# Patient Record
Sex: FEMALE | Race: WHITE | NOT HISPANIC OR LATINO | Employment: FULL TIME | URBAN - METROPOLITAN AREA
[De-identification: names, ages, dates, MRNs, and addresses within clinical notes are randomized per-mention and may not be internally consistent; named-entity substitution may affect disease eponyms.]

---

## 2019-10-18 ENCOUNTER — HOSPITAL ENCOUNTER (INPATIENT)
Facility: MEDICAL CENTER | Age: 54
LOS: 8 days | DRG: 981 | End: 2019-10-26
Attending: EMERGENCY MEDICINE | Admitting: HOSPITALIST
Payer: COMMERCIAL

## 2019-10-18 ENCOUNTER — HOSPITAL ENCOUNTER (OUTPATIENT)
Dept: RADIOLOGY | Facility: MEDICAL CENTER | Age: 54
End: 2019-10-18

## 2019-10-18 DIAGNOSIS — I48.91 ATRIAL FIBRILLATION WITH RVR (HCC): ICD-10-CM

## 2019-10-18 DIAGNOSIS — I63.89 CEREBROVASCULAR ACCIDENT (CVA) DUE TO OTHER MECHANISM (HCC): ICD-10-CM

## 2019-10-18 DIAGNOSIS — I63.512 CEREBRAL INFARCTION DUE TO OCCLUSION OF LEFT MIDDLE CEREBRAL ARTERY (HCC): ICD-10-CM

## 2019-10-18 PROBLEM — R47.01 BROCA'S APHASIA: Status: ACTIVE | Noted: 2019-10-18

## 2019-10-18 PROBLEM — N17.9 AKI (ACUTE KIDNEY INJURY) (HCC): Status: ACTIVE | Noted: 2019-10-18

## 2019-10-18 PROBLEM — E11.65 TYPE 2 DIABETES MELLITUS WITH HYPERGLYCEMIA, WITHOUT LONG-TERM CURRENT USE OF INSULIN (HCC): Status: ACTIVE | Noted: 2019-10-18

## 2019-10-18 PROBLEM — E66.09 CLASS 2 OBESITY DUE TO EXCESS CALORIES WITHOUT SERIOUS COMORBIDITY WITH BODY MASS INDEX (BMI) OF 36.0 TO 36.9 IN ADULT: Status: ACTIVE | Noted: 2019-10-18

## 2019-10-18 LAB
ALBUMIN SERPL BCP-MCNC: 3.6 G/DL (ref 3.2–4.9)
ALBUMIN/GLOB SERPL: 1.1 G/DL
ALP SERPL-CCNC: 97 U/L (ref 30–99)
ALT SERPL-CCNC: 10 U/L (ref 2–50)
ANION GAP SERPL CALC-SCNC: 9 MMOL/L (ref 0–11.9)
AST SERPL-CCNC: 13 U/L (ref 12–45)
BASOPHILS # BLD AUTO: 0.9 % (ref 0–1.8)
BASOPHILS # BLD: 0.06 K/UL (ref 0–0.12)
BILIRUB SERPL-MCNC: 0.7 MG/DL (ref 0.1–1.5)
BUN SERPL-MCNC: 29 MG/DL (ref 8–22)
CALCIUM SERPL-MCNC: 9.1 MG/DL (ref 8.5–10.5)
CHLORIDE SERPL-SCNC: 102 MMOL/L (ref 96–112)
CO2 SERPL-SCNC: 23 MMOL/L (ref 20–33)
CREAT SERPL-MCNC: 1.7 MG/DL (ref 0.5–1.4)
EKG IMPRESSION: NORMAL
EOSINOPHIL # BLD AUTO: 0.07 K/UL (ref 0–0.51)
EOSINOPHIL NFR BLD: 1.1 % (ref 0–6.9)
ERYTHROCYTE [DISTWIDTH] IN BLOOD BY AUTOMATED COUNT: 51.1 FL (ref 35.9–50)
GLOBULIN SER CALC-MCNC: 3.3 G/DL (ref 1.9–3.5)
GLUCOSE SERPL-MCNC: 196 MG/DL (ref 65–99)
HCT VFR BLD AUTO: 38.1 % (ref 37–47)
HGB BLD-MCNC: 12.2 G/DL (ref 12–16)
IMM GRANULOCYTES # BLD AUTO: 0.01 K/UL (ref 0–0.11)
IMM GRANULOCYTES NFR BLD AUTO: 0.2 % (ref 0–0.9)
LYMPHOCYTES # BLD AUTO: 1.65 K/UL (ref 1–4.8)
LYMPHOCYTES NFR BLD: 25 % (ref 22–41)
MCH RBC QN AUTO: 30.7 PG (ref 27–33)
MCHC RBC AUTO-ENTMCNC: 32 G/DL (ref 33.6–35)
MCV RBC AUTO: 95.7 FL (ref 81.4–97.8)
MONOCYTES # BLD AUTO: 0.59 K/UL (ref 0–0.85)
MONOCYTES NFR BLD AUTO: 8.9 % (ref 0–13.4)
NEUTROPHILS # BLD AUTO: 4.22 K/UL (ref 2–7.15)
NEUTROPHILS NFR BLD: 63.9 % (ref 44–72)
NRBC # BLD AUTO: 0 K/UL
NRBC BLD-RTO: 0 /100 WBC
PLATELET # BLD AUTO: 262 K/UL (ref 164–446)
PMV BLD AUTO: 11.4 FL (ref 9–12.9)
POTASSIUM SERPL-SCNC: 3.2 MMOL/L (ref 3.6–5.5)
PROT SERPL-MCNC: 6.9 G/DL (ref 6–8.2)
RBC # BLD AUTO: 3.98 M/UL (ref 4.2–5.4)
SODIUM SERPL-SCNC: 134 MMOL/L (ref 135–145)
TROPONIN T SERPL-MCNC: 26 NG/L (ref 6–19)
TSH SERPL DL<=0.005 MIU/L-ACNC: 0.62 UIU/ML (ref 0.38–5.33)
WBC # BLD AUTO: 6.6 K/UL (ref 4.8–10.8)

## 2019-10-18 PROCEDURE — 85025 COMPLETE CBC W/AUTO DIFF WBC: CPT

## 2019-10-18 PROCEDURE — 94760 N-INVAS EAR/PLS OXIMETRY 1: CPT

## 2019-10-18 PROCEDURE — 770020 HCHG ROOM/CARE - TELE (206)

## 2019-10-18 PROCEDURE — 99285 EMERGENCY DEPT VISIT HI MDM: CPT

## 2019-10-18 PROCEDURE — 36415 COLL VENOUS BLD VENIPUNCTURE: CPT

## 2019-10-18 PROCEDURE — 99223 1ST HOSP IP/OBS HIGH 75: CPT | Performed by: HOSPITALIST

## 2019-10-18 PROCEDURE — 80053 COMPREHEN METABOLIC PANEL: CPT

## 2019-10-18 PROCEDURE — 93005 ELECTROCARDIOGRAM TRACING: CPT | Performed by: EMERGENCY MEDICINE

## 2019-10-18 PROCEDURE — 96365 THER/PROPH/DIAG IV INF INIT: CPT

## 2019-10-18 PROCEDURE — 700105 HCHG RX REV CODE 258: Performed by: EMERGENCY MEDICINE

## 2019-10-18 PROCEDURE — 700111 HCHG RX REV CODE 636 W/ 250 OVERRIDE (IP): Performed by: EMERGENCY MEDICINE

## 2019-10-18 PROCEDURE — 84484 ASSAY OF TROPONIN QUANT: CPT

## 2019-10-18 PROCEDURE — 84443 ASSAY THYROID STIM HORMONE: CPT

## 2019-10-18 RX ORDER — BISACODYL 10 MG
10 SUPPOSITORY, RECTAL RECTAL
Status: DISCONTINUED | OUTPATIENT
Start: 2019-10-18 | End: 2019-10-26 | Stop reason: HOSPADM

## 2019-10-18 RX ORDER — PROMETHAZINE HYDROCHLORIDE 25 MG/1
12.5-25 SUPPOSITORY RECTAL EVERY 4 HOURS PRN
Status: DISCONTINUED | OUTPATIENT
Start: 2019-10-18 | End: 2019-10-26 | Stop reason: HOSPADM

## 2019-10-18 RX ORDER — PROCHLORPERAZINE EDISYLATE 5 MG/ML
5-10 INJECTION INTRAMUSCULAR; INTRAVENOUS EVERY 4 HOURS PRN
Status: DISCONTINUED | OUTPATIENT
Start: 2019-10-18 | End: 2019-10-26 | Stop reason: HOSPADM

## 2019-10-18 RX ORDER — PROMETHAZINE HYDROCHLORIDE 25 MG/1
12.5-25 TABLET ORAL EVERY 4 HOURS PRN
Status: DISCONTINUED | OUTPATIENT
Start: 2019-10-18 | End: 2019-10-26 | Stop reason: HOSPADM

## 2019-10-18 RX ORDER — ACETAMINOPHEN 325 MG/1
650 TABLET ORAL EVERY 6 HOURS PRN
Status: DISCONTINUED | OUTPATIENT
Start: 2019-10-18 | End: 2019-10-26 | Stop reason: HOSPADM

## 2019-10-18 RX ORDER — POLYETHYLENE GLYCOL 3350 17 G/17G
1 POWDER, FOR SOLUTION ORAL
Status: DISCONTINUED | OUTPATIENT
Start: 2019-10-18 | End: 2019-10-26 | Stop reason: HOSPADM

## 2019-10-18 RX ORDER — NAPROXEN SODIUM 220 MG
220 TABLET ORAL 2 TIMES DAILY PRN
Status: ON HOLD | COMMUNITY
End: 2019-10-26

## 2019-10-18 RX ORDER — HEPARIN SODIUM 5000 [USP'U]/ML
5000 INJECTION, SOLUTION INTRAVENOUS; SUBCUTANEOUS EVERY 8 HOURS
Status: DISCONTINUED | OUTPATIENT
Start: 2019-10-18 | End: 2019-10-19

## 2019-10-18 RX ORDER — CLONIDINE HYDROCHLORIDE 0.1 MG/1
0.1 TABLET ORAL EVERY 6 HOURS PRN
Status: DISCONTINUED | OUTPATIENT
Start: 2019-10-18 | End: 2019-10-26 | Stop reason: HOSPADM

## 2019-10-18 RX ORDER — SODIUM CHLORIDE, SODIUM LACTATE, POTASSIUM CHLORIDE, CALCIUM CHLORIDE 600; 310; 30; 20 MG/100ML; MG/100ML; MG/100ML; MG/100ML
INJECTION, SOLUTION INTRAVENOUS CONTINUOUS
Status: DISCONTINUED | OUTPATIENT
Start: 2019-10-18 | End: 2019-10-20

## 2019-10-18 RX ORDER — AMOXICILLIN 250 MG
2 CAPSULE ORAL 2 TIMES DAILY
Status: DISCONTINUED | OUTPATIENT
Start: 2019-10-18 | End: 2019-10-26 | Stop reason: HOSPADM

## 2019-10-18 RX ORDER — ONDANSETRON 2 MG/ML
4 INJECTION INTRAMUSCULAR; INTRAVENOUS EVERY 4 HOURS PRN
Status: DISCONTINUED | OUTPATIENT
Start: 2019-10-18 | End: 2019-10-26 | Stop reason: HOSPADM

## 2019-10-18 RX ORDER — ONDANSETRON 4 MG/1
4 TABLET, ORALLY DISINTEGRATING ORAL EVERY 4 HOURS PRN
Status: DISCONTINUED | OUTPATIENT
Start: 2019-10-18 | End: 2019-10-26 | Stop reason: HOSPADM

## 2019-10-18 RX ADMIN — DILTIAZEM HYDROCHLORIDE 5 MG/HR: 5 INJECTION INTRAVENOUS at 22:38

## 2019-10-18 ASSESSMENT — ENCOUNTER SYMPTOMS
NEUROLOGICAL NEGATIVE: 1
RESPIRATORY NEGATIVE: 1
CONSTITUTIONAL NEGATIVE: 1
GASTROINTESTINAL NEGATIVE: 1
EYES NEGATIVE: 1
PSYCHIATRIC NEGATIVE: 1
MUSCULOSKELETAL NEGATIVE: 1

## 2019-10-19 ENCOUNTER — APPOINTMENT (OUTPATIENT)
Dept: RADIOLOGY | Facility: MEDICAL CENTER | Age: 54
DRG: 981 | End: 2019-10-19
Attending: STUDENT IN AN ORGANIZED HEALTH CARE EDUCATION/TRAINING PROGRAM
Payer: COMMERCIAL

## 2019-10-19 ENCOUNTER — APPOINTMENT (OUTPATIENT)
Dept: RADIOLOGY | Facility: MEDICAL CENTER | Age: 54
DRG: 981 | End: 2019-10-19
Attending: PSYCHIATRY & NEUROLOGY
Payer: COMMERCIAL

## 2019-10-19 ENCOUNTER — APPOINTMENT (OUTPATIENT)
Dept: CARDIOLOGY | Facility: MEDICAL CENTER | Age: 54
DRG: 981 | End: 2019-10-19
Attending: HOSPITALIST
Payer: COMMERCIAL

## 2019-10-19 LAB
ANION GAP SERPL CALC-SCNC: 8 MMOL/L (ref 0–11.9)
ANISOCYTOSIS BLD QL SMEAR: ABNORMAL
APTT PPP: 26.5 SEC (ref 24.7–36)
BASOPHILS # BLD AUTO: 1.3 % (ref 0–1.8)
BASOPHILS # BLD: 0.07 K/UL (ref 0–0.12)
BUN SERPL-MCNC: 25 MG/DL (ref 8–22)
BURR CELLS BLD QL SMEAR: NORMAL
CALCIUM SERPL-MCNC: 8.4 MG/DL (ref 8.5–10.5)
CHLORIDE SERPL-SCNC: 103 MMOL/L (ref 96–112)
CO2 SERPL-SCNC: 23 MMOL/L (ref 20–33)
COMMENT 1642: NORMAL
CREAT SERPL-MCNC: 1.58 MG/DL (ref 0.5–1.4)
CREAT UR-MCNC: 202.4 MG/DL
EOSINOPHIL # BLD AUTO: 0.08 K/UL (ref 0–0.51)
EOSINOPHIL NFR BLD: 1.5 % (ref 0–6.9)
ERYTHROCYTE [DISTWIDTH] IN BLOOD BY AUTOMATED COUNT: 53 FL (ref 35.9–50)
GLUCOSE BLD-MCNC: 142 MG/DL (ref 65–99)
GLUCOSE BLD-MCNC: 158 MG/DL (ref 65–99)
GLUCOSE BLD-MCNC: 176 MG/DL (ref 65–99)
GLUCOSE BLD-MCNC: 193 MG/DL (ref 65–99)
GLUCOSE SERPL-MCNC: 140 MG/DL (ref 65–99)
HCT VFR BLD AUTO: 33.8 % (ref 37–47)
HGB BLD-MCNC: 10.1 G/DL (ref 12–16)
HYPOCHROMIA BLD QL SMEAR: ABNORMAL
IMM GRANULOCYTES # BLD AUTO: 0.02 K/UL (ref 0–0.11)
IMM GRANULOCYTES NFR BLD AUTO: 0.4 % (ref 0–0.9)
INR PPP: 1.12 (ref 0.87–1.13)
LYMPHOCYTES # BLD AUTO: 1.46 K/UL (ref 1–4.8)
LYMPHOCYTES NFR BLD: 27.2 % (ref 22–41)
MACROCYTES BLD QL SMEAR: ABNORMAL
MCH RBC QN AUTO: 29.3 PG (ref 27–33)
MCHC RBC AUTO-ENTMCNC: 29.9 G/DL (ref 33.6–35)
MCV RBC AUTO: 98 FL (ref 81.4–97.8)
MICROCYTES BLD QL SMEAR: ABNORMAL
MONOCYTES # BLD AUTO: 0.6 K/UL (ref 0–0.85)
MONOCYTES NFR BLD AUTO: 11.2 % (ref 0–13.4)
MORPHOLOGY BLD-IMP: NORMAL
NEUTROPHILS # BLD AUTO: 3.14 K/UL (ref 2–7.15)
NEUTROPHILS NFR BLD: 58.4 % (ref 44–72)
NRBC # BLD AUTO: 0 K/UL
NRBC BLD-RTO: 0 /100 WBC
OVALOCYTES BLD QL SMEAR: NORMAL
PLATELET # BLD AUTO: 208 K/UL (ref 164–446)
PLATELET BLD QL SMEAR: NORMAL
PMV BLD AUTO: 11.2 FL (ref 9–12.9)
POIKILOCYTOSIS BLD QL SMEAR: NORMAL
POTASSIUM SERPL-SCNC: 3.9 MMOL/L (ref 3.6–5.5)
PROTHROMBIN TIME: 14.7 SEC (ref 12–14.6)
RBC # BLD AUTO: 3.45 M/UL (ref 4.2–5.4)
RBC BLD AUTO: PRESENT
SODIUM SERPL-SCNC: 134 MMOL/L (ref 135–145)
SODIUM UR-SCNC: 104 MMOL/L
TROPONIN T SERPL-MCNC: 22 NG/L (ref 6–19)
TROPONIN T SERPL-MCNC: 24 NG/L (ref 6–19)
WBC # BLD AUTO: 5.4 K/UL (ref 4.8–10.8)

## 2019-10-19 PROCEDURE — 93306 TTE W/DOPPLER COMPLETE: CPT | Mod: 26 | Performed by: INTERNAL MEDICINE

## 2019-10-19 PROCEDURE — 99222 1ST HOSP IP/OBS MODERATE 55: CPT | Performed by: PSYCHIATRY & NEUROLOGY

## 2019-10-19 PROCEDURE — 700102 HCHG RX REV CODE 250 W/ 637 OVERRIDE(OP): Performed by: STUDENT IN AN ORGANIZED HEALTH CARE EDUCATION/TRAINING PROGRAM

## 2019-10-19 PROCEDURE — 700105 HCHG RX REV CODE 258: Performed by: HOSPITALIST

## 2019-10-19 PROCEDURE — 70544 MR ANGIOGRAPHY HEAD W/O DYE: CPT

## 2019-10-19 PROCEDURE — 85730 THROMBOPLASTIN TIME PARTIAL: CPT

## 2019-10-19 PROCEDURE — 3E02340 INTRODUCTION OF INFLUENZA VACCINE INTO MUSCLE, PERCUTANEOUS APPROACH: ICD-10-PCS | Performed by: STUDENT IN AN ORGANIZED HEALTH CARE EDUCATION/TRAINING PROGRAM

## 2019-10-19 PROCEDURE — 700111 HCHG RX REV CODE 636 W/ 250 OVERRIDE (IP): Performed by: STUDENT IN AN ORGANIZED HEALTH CARE EDUCATION/TRAINING PROGRAM

## 2019-10-19 PROCEDURE — 80048 BASIC METABOLIC PNL TOTAL CA: CPT

## 2019-10-19 PROCEDURE — 70450 CT HEAD/BRAIN W/O DYE: CPT

## 2019-10-19 PROCEDURE — A9270 NON-COVERED ITEM OR SERVICE: HCPCS | Performed by: STUDENT IN AN ORGANIZED HEALTH CARE EDUCATION/TRAINING PROGRAM

## 2019-10-19 PROCEDURE — 700102 HCHG RX REV CODE 250 W/ 637 OVERRIDE(OP): Performed by: HOSPITALIST

## 2019-10-19 PROCEDURE — 93880 EXTRACRANIAL BILAT STUDY: CPT

## 2019-10-19 PROCEDURE — 700117 HCHG RX CONTRAST REV CODE 255: Performed by: HOSPITALIST

## 2019-10-19 PROCEDURE — 90471 IMMUNIZATION ADMIN: CPT

## 2019-10-19 PROCEDURE — 99233 SBSQ HOSP IP/OBS HIGH 50: CPT | Performed by: STUDENT IN AN ORGANIZED HEALTH CARE EDUCATION/TRAINING PROGRAM

## 2019-10-19 PROCEDURE — 84484 ASSAY OF TROPONIN QUANT: CPT | Mod: 91

## 2019-10-19 PROCEDURE — 770020 HCHG ROOM/CARE - TELE (206)

## 2019-10-19 PROCEDURE — 82570 ASSAY OF URINE CREATININE: CPT

## 2019-10-19 PROCEDURE — 84300 ASSAY OF URINE SODIUM: CPT

## 2019-10-19 PROCEDURE — 36415 COLL VENOUS BLD VENIPUNCTURE: CPT

## 2019-10-19 PROCEDURE — 85610 PROTHROMBIN TIME: CPT

## 2019-10-19 PROCEDURE — 83036 HEMOGLOBIN GLYCOSYLATED A1C: CPT

## 2019-10-19 PROCEDURE — 70551 MRI BRAIN STEM W/O DYE: CPT

## 2019-10-19 PROCEDURE — 85025 COMPLETE CBC W/AUTO DIFF WBC: CPT

## 2019-10-19 PROCEDURE — 82962 GLUCOSE BLOOD TEST: CPT | Mod: 91

## 2019-10-19 PROCEDURE — 93306 TTE W/DOPPLER COMPLETE: CPT

## 2019-10-19 PROCEDURE — 99222 1ST HOSP IP/OBS MODERATE 55: CPT | Performed by: INTERNAL MEDICINE

## 2019-10-19 PROCEDURE — A9270 NON-COVERED ITEM OR SERVICE: HCPCS | Performed by: HOSPITALIST

## 2019-10-19 PROCEDURE — 700111 HCHG RX REV CODE 636 W/ 250 OVERRIDE (IP): Performed by: HOSPITALIST

## 2019-10-19 PROCEDURE — 90686 IIV4 VACC NO PRSV 0.5 ML IM: CPT | Performed by: HOSPITALIST

## 2019-10-19 RX ORDER — ASPIRIN 325 MG
325 TABLET ORAL DAILY
Status: DISCONTINUED | OUTPATIENT
Start: 2019-10-19 | End: 2019-10-20

## 2019-10-19 RX ORDER — HEPARIN SODIUM 5000 [USP'U]/100ML
INJECTION, SOLUTION INTRAVENOUS CONTINUOUS
Status: DISCONTINUED | OUTPATIENT
Start: 2019-10-19 | End: 2019-10-19

## 2019-10-19 RX ORDER — ASPIRIN 300 MG/1
300 SUPPOSITORY RECTAL DAILY
Status: DISCONTINUED | OUTPATIENT
Start: 2019-10-19 | End: 2019-10-20

## 2019-10-19 RX ORDER — ASPIRIN 81 MG/1
324 TABLET, CHEWABLE ORAL DAILY
Status: DISCONTINUED | OUTPATIENT
Start: 2019-10-19 | End: 2019-10-20

## 2019-10-19 RX ORDER — ATORVASTATIN CALCIUM 40 MG/1
40 TABLET, FILM COATED ORAL EVERY EVENING
Status: DISCONTINUED | OUTPATIENT
Start: 2019-10-19 | End: 2019-10-26 | Stop reason: HOSPADM

## 2019-10-19 RX ORDER — LORAZEPAM 2 MG/ML
1 INJECTION INTRAMUSCULAR
Status: COMPLETED | OUTPATIENT
Start: 2019-10-19 | End: 2019-10-19

## 2019-10-19 RX ORDER — LORAZEPAM 2 MG/ML
0.5 INJECTION INTRAMUSCULAR ONCE
Status: COMPLETED | OUTPATIENT
Start: 2019-10-19 | End: 2019-10-19

## 2019-10-19 RX ORDER — LORAZEPAM 2 MG/ML
1 INJECTION INTRAMUSCULAR ONCE
Status: COMPLETED | OUTPATIENT
Start: 2019-10-19 | End: 2019-10-19

## 2019-10-19 RX ADMIN — ASPIRIN 81 MG: 81 TABLET, COATED ORAL at 05:08

## 2019-10-19 RX ADMIN — HUMAN ALBUMIN MICROSPHERES AND PERFLUTREN 3 ML: 10; .22 INJECTION, SOLUTION INTRAVENOUS at 19:00

## 2019-10-19 RX ADMIN — SODIUM CHLORIDE, POTASSIUM CHLORIDE, SODIUM LACTATE AND CALCIUM CHLORIDE: 600; 310; 30; 20 INJECTION, SOLUTION INTRAVENOUS at 00:38

## 2019-10-19 RX ADMIN — POTASSIUM BICARBONATE 50 MEQ: 978 TABLET, EFFERVESCENT ORAL at 00:38

## 2019-10-19 RX ADMIN — LORAZEPAM 1 MG: 2 INJECTION INTRAMUSCULAR; INTRAVENOUS at 21:00

## 2019-10-19 RX ADMIN — LORAZEPAM 0.5 MG: 2 INJECTION INTRAMUSCULAR; INTRAVENOUS at 14:03

## 2019-10-19 RX ADMIN — INFLUENZA A VIRUS A/BRISBANE/02/2018 IVR-190 (H1N1) ANTIGEN (FORMALDEHYDE INACTIVATED), INFLUENZA A VIRUS A/KANSAS/14/2017 X-327 (H3N2) ANTIGEN (FORMALDEHYDE INACTIVATED), INFLUENZA B VIRUS B/PHUKET/3073/2013 ANTIGEN (FORMALDEHYDE INACTIVATED), AND INFLUENZA B VIRUS B/MARYLAND/15/2016 BX-69A ANTIGEN (FORMALDEHYDE INACTIVATED) 0.5 ML: 15; 15; 15; 15 INJECTION, SUSPENSION INTRAMUSCULAR at 00:38

## 2019-10-19 RX ADMIN — DILTIAZEM HYDROCHLORIDE 30 MG: 30 TABLET, FILM COATED ORAL at 23:06

## 2019-10-19 RX ADMIN — HEPARIN SODIUM 5000 UNITS: 5000 INJECTION, SOLUTION INTRAVENOUS; SUBCUTANEOUS at 00:38

## 2019-10-19 RX ADMIN — DILTIAZEM HYDROCHLORIDE 30 MG: 30 TABLET, FILM COATED ORAL at 13:02

## 2019-10-19 RX ADMIN — HEPARIN SODIUM 5000 UNITS: 5000 INJECTION, SOLUTION INTRAVENOUS; SUBCUTANEOUS at 05:08

## 2019-10-19 RX ADMIN — LORAZEPAM 1 MG: 2 INJECTION INTRAMUSCULAR; INTRAVENOUS at 20:17

## 2019-10-19 RX ADMIN — DILTIAZEM HYDROCHLORIDE 30 MG: 30 TABLET, FILM COATED ORAL at 18:16

## 2019-10-19 RX ADMIN — INSULIN HUMAN 1 UNITS: 100 INJECTION, SOLUTION PARENTERAL at 13:36

## 2019-10-19 RX ADMIN — SODIUM CHLORIDE, POTASSIUM CHLORIDE, SODIUM LACTATE AND CALCIUM CHLORIDE: 600; 310; 30; 20 INJECTION, SOLUTION INTRAVENOUS at 13:02

## 2019-10-19 RX ADMIN — DILTIAZEM HYDROCHLORIDE 10 MG/HR: 5 INJECTION INTRAVENOUS at 22:09

## 2019-10-19 RX ADMIN — ATORVASTATIN CALCIUM 40 MG: 40 TABLET, FILM COATED ORAL at 18:16

## 2019-10-19 RX ADMIN — DILTIAZEM HYDROCHLORIDE 10 MG/HR: 5 INJECTION INTRAVENOUS at 11:00

## 2019-10-19 SDOH — HEALTH STABILITY: MENTAL HEALTH: HOW OFTEN DO YOU HAVE A DRINK CONTAINING ALCOHOL?: NEVER

## 2019-10-19 ASSESSMENT — COGNITIVE AND FUNCTIONAL STATUS - GENERAL
DAILY ACTIVITIY SCORE: 24
SUGGESTED CMS G CODE MODIFIER MOBILITY: CH
SUGGESTED CMS G CODE MODIFIER DAILY ACTIVITY: CH
MOBILITY SCORE: 24

## 2019-10-19 ASSESSMENT — PATIENT HEALTH QUESTIONNAIRE - PHQ9
8. MOVING OR SPEAKING SO SLOWLY THAT OTHER PEOPLE COULD HAVE NOTICED. OR THE OPPOSITE, BEING SO FIGETY OR RESTLESS THAT YOU HAVE BEEN MOVING AROUND A LOT MORE THAN USUAL: NOT AT ALL
SUM OF ALL RESPONSES TO PHQ9 QUESTIONS 1 AND 2: 2
3. TROUBLE FALLING OR STAYING ASLEEP OR SLEEPING TOO MUCH: MORE THAN HALF THE DAYS
6. FEELING BAD ABOUT YOURSELF - OR THAT YOU ARE A FAILURE OR HAVE LET YOURSELF OR YOUR FAMILY DOWN: NOT AL ALL
5. POOR APPETITE OR OVEREATING: NEARLY EVERY DAY
2. FEELING DOWN, DEPRESSED, IRRITABLE, OR HOPELESS: SEVERAL DAYS
7. TROUBLE CONCENTRATING ON THINGS, SUCH AS READING THE NEWSPAPER OR WATCHING TELEVISION: NOT AT ALL
SUM OF ALL RESPONSES TO PHQ QUESTIONS 1-9: 10
1. LITTLE INTEREST OR PLEASURE IN DOING THINGS: SEVERAL DAYS
4. FEELING TIRED OR HAVING LITTLE ENERGY: NEARLY EVERY DAY
9. THOUGHTS THAT YOU WOULD BE BETTER OFF DEAD, OR OF HURTING YOURSELF: NOT AT ALL

## 2019-10-19 ASSESSMENT — LIFESTYLE VARIABLES
EVER_SMOKED: YES
HOW MANY TIMES IN THE PAST YEAR HAVE YOU HAD 5 OR MORE DRINKS IN A DAY: 0
TOTAL SCORE: 0
AVERAGE NUMBER OF DAYS PER WEEK YOU HAVE A DRINK CONTAINING ALCOHOL: 0
CONSUMPTION TOTAL: NEGATIVE
ON A TYPICAL DAY WHEN YOU DRINK ALCOHOL HOW MANY DRINKS DO YOU HAVE: 0
TOTAL SCORE: 0
ALCOHOL_USE: NO
TOTAL SCORE: 0
EVER HAD A DRINK FIRST THING IN THE MORNING TO STEADY YOUR NERVES TO GET RID OF A HANGOVER: NO
HAVE PEOPLE ANNOYED YOU BY CRITICIZING YOUR DRINKING: NO
EVER FELT BAD OR GUILTY ABOUT YOUR DRINKING: NO
HAVE YOU EVER FELT YOU SHOULD CUT DOWN ON YOUR DRINKING: NO

## 2019-10-19 ASSESSMENT — ENCOUNTER SYMPTOMS
FEVER: 0
PALPITATIONS: 1
NERVOUS/ANXIOUS: 0
SHORTNESS OF BREATH: 0
DEPRESSION: 0
COUGH: 0

## 2019-10-19 ASSESSMENT — COPD QUESTIONNAIRES
HAVE YOU SMOKED AT LEAST 100 CIGARETTES IN YOUR ENTIRE LIFE: YES
COPD SCREENING SCORE: 6
IN THE PAST 12 MONTHS DO YOU DO LESS THAN YOU USED TO BECAUSE OF YOUR BREATHING PROBLEMS: AGREE
DURING THE PAST 4 WEEKS HOW MUCH DID YOU FEEL SHORT OF BREATH: SOME OF THE TIME
DO YOU EVER COUGH UP ANY MUCUS OR PHLEGM?: YES, A FEW DAYS A WEEK OR MONTH

## 2019-10-19 NOTE — PROGRESS NOTES
2 RN skin check complete  No areas of concern noted at this time skin clean dry and intact scattered bruises noted.

## 2019-10-19 NOTE — ASSESSMENT & PLAN NOTE
-HR is somewhat better but remains tachycardic, DCCV tomorrow  -start DOAC  -monitor on tele, replace Mg/K  -changed to metoprolol  mg daily  -digoxin 125 mcg daily

## 2019-10-19 NOTE — PROGRESS NOTES
Bedside report received, assumed pt care. Pt sitting up in bed, no signs of distress and no complaints of pain. RN assisted pt to restroom, pt ambulate with steady gait. Neuro assessment complete, pt has bilateral equal strength. Pt updated on POC and questions answered. Tele monitor on, safety precautions in place, call light in reach.

## 2019-10-19 NOTE — ASSESSMENT & PLAN NOTE
10/19-10/21 - resolved  Results from last 7 days   Lab Units 10/22/19  0316 10/21/19  0230 10/19/19  0543 10/18/19  2145   POTASSIUM 102 mmol/L 4.4 4.2 3.9 3.2*   CREATININE 109 mg/dL 1.94* 1.65* 1.58* 1.70*   IF DOMINGO AMER 349007 mL/min/1.73 m 2 32* 39* 41* 38*   IF NON  AMER 109GFRB mL/min/1.73 m 2 27* 32* 34* 31*   MAGNESIUM 561 mg/dL 1.8 1.7  --   --

## 2019-10-19 NOTE — ED NOTES
Bedside report to receiving FABIOLA Amador for T703-2. Questions answered. All belongings accounted for at transfer.

## 2019-10-19 NOTE — ED PROVIDER NOTES
ED Provider Note    CHIEF COMPLAINT  Chief Complaint   Patient presents with   • Atrial Fibrillation     pt was in afib RVR at outside facility in the 170's   • Sent by MD     transfer from [facility]  Judith Vallecillo is a 53 y.o. female who presents to the emergency department for evaluation of A. fib and possible stroke.  The patient has a receptive and expressive aphasia is difficult to get much history from her.  She states she had a hard time speaking now for about the last 3 weeks.  She is having a hard time finding words and speaking.  She denies any focal numbness tingling weakness.  She went to the hospital today in [place] where she was found to have A. fib with RVR.  She was given diltiazem sent here for evaluation.  Patient denies any other numbness tingling or weakness.  She has been having chest pain.  Is off and on for 3 weeks as anterior chest pain described as pressure.  No tearing pain region the back.  This is mild shortness of breath no nausea or vomiting.  History is somewhat limited because of her aphasia.  She does smoke but denies drugs or alcohol.  Denies any other acute concerns or complaints    REVIEW OF SYSTEMS  See [facility] for further details. All other systems are negative.    PAST MEDICAL HISTORY  Past Medical History:   Diagnosis Date   • Pneumonia 1975    as a child       FAMILY HISTORY  History reviewed. No pertinent family history.    SOCIAL HISTORY  Social History     Socioeconomic History   • Marital status: Single     Spouse name: Not on file   • Number of children: Not on file   • Years of education: Not on file   • Highest education level: Not on file   Occupational History   • Not on file   Social Needs   • Financial resource strain: Not on file   • Food insecurity:     Worry: Not on file     Inability: Not on file   • Transportation needs:     Medical: Not on file     Non-medical: Not on file   Tobacco Use   • Smoking status: Current Every Day Smoker      "Packs/day: 0.50     Years: 20.00     Pack years: 10.00     Types: Cigarettes   Substance and Sexual Activity   • Alcohol use: Yes     Comment: very rare drink of a beer or wine   • Drug use: No   • Sexual activity: Not on file   Lifestyle   • Physical activity:     Days per week: Not on file     Minutes per session: Not on file   • Stress: Not on file   Relationships   • Social connections:     Talks on phone: Not on file     Gets together: Not on file     Attends Voodoo service: Not on file     Active member of club or organization: Not on file     Attends meetings of clubs or organizations: Not on file     Relationship status: Not on file   • Intimate partner violence:     Fear of current or ex partner: Not on file     Emotionally abused: Not on file     Physically abused: Not on file     Forced sexual activity: Not on file   Other Topics Concern   • Not on file   Social History Narrative   • Not on file       SURGICAL HISTORY  Past Surgical History:   Procedure Laterality Date   • RECOVERY  6/2/2011    [facility]       CURRENT MEDICATIONS  Home Medications    **Home medications have not yet been reviewed for this encounter**         ALLERGIES  Allergies   Allergen Reactions   • Bee      Swelling and airway closure.   • Latex      Swelling, redness, itching.   • Strawberry      itching       PHYSICAL EXAM  VITAL SIGNS: /55   Pulse (!) 123   Temp 36.1 °C (97 °F) (Temporal)   Resp 18   Ht 1.575 m (5' 2\")   Wt 89.8 kg (198 lb)   SpO2 96%   BMI 36.21 kg/m²    Constitutional: Awake alert nontoxic no acute distress  HENT: Normocephalic, Atraumatic, Bilateral external ears normal, Oropharynx moist, No oral exudates, Nose normal.   Eyes: PERRL, EOMI, Conjunctiva normal, No discharge.   Cardiovascular: Tachycardic and regular regular no murmurs rubs or gallops  Thorax & Lungs: Normal breath sounds, No respiratory distress, No wheezing,   Abdomen: Bowel " sounds normal, Soft, No tenderness  Skin: Warm, Dry, No erythema, No rash.   Musculoskeletal: Good range of motion in all major joints.  No asymmetric edema good pulses.  Neurologic: Alert, the patient has an aphasia and she is not able to comply with the entire neurologic examination.  She seems to have no focal weakness in arms legs no decreased sensation.  Cranial nerves are grossly intact.  She will not comply the detailed examination per  Psychiatric: Affect normal,      Results for orders placed or performed during the hospital encounter of 10/18/19   CBC WITH DIFFERENTIAL   Result Value Ref Range    WBC 6.6 4.8 - 10.8 K/uL    RBC 3.98 (L) 4.20 - 5.40 M/uL    Hemoglobin 12.2 12.0 - 16.0 g/dL    Hematocrit 38.1 37.0 - 47.0 %    MCV 95.7 81.4 - 97.8 fL    MCH 30.7 27.0 - 33.0 pg    MCHC 32.0 (L) 33.6 - 35.0 g/dL    RDW 51.1 (H) 35.9 - 50.0 fL    Platelet Count 262 164 - 446 K/uL    MPV 11.4 9.0 - 12.9 fL    Neutrophils-Polys 63.90 44.00 - 72.00 %    Lymphocytes 25.00 22.00 - 41.00 %    Monocytes 8.90 0.00 - 13.40 %    Eosinophils 1.10 0.00 - 6.90 %    Basophils 0.90 0.00 - 1.80 %    Immature Granulocytes 0.20 0.00 - 0.90 %    Nucleated RBC 0.00 /100 WBC    Neutrophils (Absolute) 4.22 2.00 - 7.15 K/uL    Lymphs (Absolute) 1.65 1.00 - 4.80 K/uL    Monos (Absolute) 0.59 0.00 - 0.85 K/uL    Eos (Absolute) 0.07 0.00 - 0.51 K/uL    Baso (Absolute) 0.06 0.00 - 0.12 K/uL    Immature Granulocytes (abs) 0.01 0.00 - 0.11 K/uL    NRBC (Absolute) 0.00 K/uL   COMP METABOLIC PANEL   Result Value Ref Range    Sodium 134 (L) 135 - 145 mmol/L    Potassium 3.2 (L) 3.6 - 5.5 mmol/L    Chloride 102 96 - 112 mmol/L    Co2 23 20 - 33 mmol/L    Anion Gap 9.0 0.0 - 11.9    Glucose 196 (H) 65 - 99 mg/dL    Bun 29 (H) 8 - 22 mg/dL    Creatinine 1.70 (H) 0.50 - 1.40 mg/dL    Calcium 9.1 8.5 - 10.5 mg/dL    AST(SGOT) 13 12 - 45 U/L    ALT(SGPT) 10 2 - 50 U/L    Alkaline Phosphatase 97 30 - 99 U/L    Total Bilirubin 0.7 0.1 - 1.5 mg/dL     Albumin 3.6 3.2 - 4.9 g/dL    Total Protein 6.9 6.0 - 8.2 g/dL    Globulin 3.3 1.9 - 3.5 g/dL    A-G Ratio 1.1 g/dL   TROPONIN   Result Value Ref Range    Troponin T 26 (H) 6 - 19 ng/L   ESTIMATED GFR   Result Value Ref Range    GFR If  38 (A) >60 mL/min/1.73 m 2    GFR If Non  31 (A) >60 mL/min/1.73 m 2   EKG (NOW)   Result Value Ref Range    Report       [facility] Emergency Dept.    Test Date:  2019-10-18  Pt Name:    PHU LIANG           Department: ER  MRN:        [id number]                      Room:       RD 10  Gender:     Female                       Technician: [id number]  :        1965                   Requested By:RICHIE JEFF  Order #:    [id number]                    Reading MD: RICHIE JEFF. AMD    Measurements  Intervals                                Axis  Rate:       125                          P:  OK:                                      QRS:        92  QRSD:       80                           T:          255  QT:         316  QTc:        456    Interpretive Statements  ATRIAL FIBRILLATION, V-RATE    MULTIPLE VENTRICULAR PREMATURE COMPLEXES  PROBABLE LATERAL INFARCT, AGE INDETERMINATE  CONSIDER ANTEROSEPTAL INFARCT  BORDERLINE T ABNORMALITIES, INFERIOR LEAD    Electronically Signed On 10- 22:24:31 PDT by RICHIE JEFF. AMD          RADIOLOGY/PROCEDURES  OUTSIDE IMAGES-CT HEAD   Final Result      OUTSIDE IMAGES-DX CHEST   Final Result        Images from the transferring hospital been reviewed.    COURSE & MEDICAL DECISION MAKING  Pertinent Labs & Imaging studies reviewed. (See chart for details)      The patient presents transferred here for stroke symptoms and A. fib with RVR.      She had a head CT at the transferring hospital which shows a subacute stroke.    The patient symptoms have been present for 2 or 3 weeks.  Therefore she is not a candidate for alteplase and therefore she is not a candidate for  thrombectomy.  Therefore CT is or not obtained emergently.    She does have A. fib with RVR.  She received diltiazem in the hospital.  She is no longer on diltiazem and heart rate is again rapid.    We will restart her on diltiazem infusion.    The patient will be admitted to hospice for continued work-up and treatment.  She had a complete set of labs and a CT at the transfer hospital.  These have been reviewed.    The patient is not a candidate for alteplase for the abnormal finding on the CT scan and the timing.  Several days after the onset.    She started on a diltiazem infusion will be admitted to the hospitalist.  Some labs will be repeated including a troponin.  TSH is pending.  Is admitted to the hospitalist in guarded condition per    FINAL IMPRESSION  1. Cerebrovascular accident (CVA) due to other mechanism (HCC)     2. Atrial fibrillation with RVR (HCC)         2.   3.         Electronically signed by: Ramesh Luz, 10/18/2019 10:21 PM

## 2019-10-19 NOTE — PROGRESS NOTES
Received patient as a new admit from the ED. Bedside report received, assumed care of patient. Patient transported to floor on zoll monitor by RN. Pt is A+O x 4. No acute distress noted. Rhythm is Afib 130s, patient received on a Cardizem drip at 10mg/hr. Patient is not on oxygen. Informed of patient of safety and call bell system. Bed is low/locked, call bell within reach, bed alarm in place.

## 2019-10-19 NOTE — CONSULTS
Cardiology Consult Note    DOS: 10/19/2019    Consulting physician: Meño Pena DO    Chief complaint/Reason for consult: AF w RVR    HPI:  Patient is a 52 yo F. She has a history of prior stroke. She lives and works in [place] about 2 hours from here. Sounds like she was in her usual state of health until about 3 days ago when suddenly she had word finding difficulties during speech. She denies any focal weakness, vertigo, facial droop, or other deficits. She did not seek any care for this. Yesterday evening she developed exertional chest tightness. Location is substernal. Associated with palpitations. She presented to the ED and was found to be in AF with RVR. She was started on a diltiazem gtt and was admitted to the telemetry unit. No complaints this AM but with persistent word finding difficulties.    ROS (+ highlighted in red):  Constitutional: Fevers/chills/fatigue/weightloss  HEENT: Blurry vision/eye pain/sore throat/hearing loss  Respiratory: Shortness of breath/cough  Cardiovascular: Chest pain/palpitations/edema/orthopnea/syncope  GI: Nausea/vomitting/diarrhea  MSK: Arthralgias/myagias/muscle weakness  Skin: Rash/sores  Neurological: Numbness/tremors/vertigo  Endocrine: Excessive thirst/polyuria/cold intolerance/heat intolerance  Psych: Depression/anxiety    Past Medical History:   Diagnosis Date   • Pneumonia 1975    as a child       Past Surgical History:   Procedure Laterality Date   • RECOVERY  6/2/2011    Performed by SURGERY[facility] at [facility]       Social History     Socioeconomic History   • Marital status: Single     Spouse name: Not on file   • Number of children: Not on file   • Years of education: Not on file   • Highest education level: Not on file   Occupational History   • Not on file   Social Needs   • Financial resource strain: Not on file   • Food insecurity:     Worry: Not on file     Inability: Not on file   • Transportation needs:     Medical: Not on file      Non-medical: Not on file   Tobacco Use   • Smoking status: Current Every Day Smoker     Packs/day: 0.50     Years: 20.00     Pack years: 10.00     Types: Cigarettes   • Smokeless tobacco: Never Used   Substance and Sexual Activity   • Alcohol use: Not Currently     Frequency: Never     Comment: very rare drink of a beer or wine   • Drug use: No   • Sexual activity: Not on file   Lifestyle   • Physical activity:     Days per week: Not on file     Minutes per session: Not on file   • Stress: Not on file   Relationships   • Social connections:     Talks on phone: Not on file     Gets together: Not on file     Attends Christianity service: Not on file     Active member of club or organization: Not on file     Attends meetings of clubs or organizations: Not on file     Relationship status: Not on file   • Intimate partner violence:     Fear of current or ex partner: Not on file     Emotionally abused: Not on file     Physically abused: Not on file     Forced sexual activity: Not on file   Other Topics Concern   • Not on file   Social History Narrative   • Not on file       Family History   Problem Relation Age of Onset   • Heart Disease Mother    • Heart Disease Father        Allergies   Allergen Reactions   • Bee Anaphylaxis     Swelling and airway closure.   • Latex Itching and Swelling     Swelling, redness, itching.   • Strawberry Itching     itching       Current Facility-Administered Medications   Medication Dose Route Frequency Provider Last Rate Last Dose   • heparin infusion 25,000 units in 500 mL 0.45% NACL   Intravenous Continuous Meño Pena D.O.       • DILTIAZem (CARDIZEM) tablet 30 mg  30 mg Oral Q6HRS Meño Pena D.O.       • acetaminophen (TYLENOL) tablet 650 mg  650 mg Oral Q6HRS PRN Polo Trammell M.D.       • cloNIDine (CATAPRES) tablet 0.1 mg  0.1 mg Oral Q6HRS PRN Polo Trammell M.D.       • ondansetron (ZOFRAN) syringe/vial injection 4 mg  4 mg Intravenous Q4HRS PRN Polo Trammell  M.D.       • ondansetron (ZOFRAN ODT) dispertab 4 mg  4 mg Oral Q4HRS PRN Polo Trammell M.D.       • promethazine (PHENERGAN) tablet 12.5-25 mg  12.5-25 mg Oral Q4HRS PRN Polo Trammell M.D.       • promethazine (PHENERGAN) suppository 12.5-25 mg  12.5-25 mg Rectal Q4HRS PRN Polo Trammell M.D.       • prochlorperazine (COMPAZINE) injection 5-10 mg  5-10 mg Intravenous Q4HRS PRN Polo Trammell M.D.       • senna-docusate (PERICOLACE or SENOKOT S) 8.6-50 MG per tablet 2 Tab  2 Tab Oral BID Polo Trammell M.D.        And   • polyethylene glycol/lytes (MIRALAX) PACKET 1 Packet  1 Packet Oral QDAY PRN Polo Trammell M.D.        And   • magnesium hydroxide (MILK OF MAGNESIA) suspension 30 mL  30 mL Oral QDAY PRN Polo Trammell M.D.        And   • bisacodyl (DULCOLAX) suppository 10 mg  10 mg Rectal QDAY PRN Polo Trammell M.D.       • lactated ringers infusion   Intravenous Continuous Polo Trammell M.D. 83 mL/hr at 10/19/19 0038     • DILTIAZem (CARDIZEM) 100 mg in D5W 100 mL Infusion  10 mg/hr Intravenous Continuous Polo Trammell M.D. 10 mL/hr at 10/19/19 0001 10 mg/hr at 10/19/19 0001   • aspirin EC (ECOTRIN) tablet 81 mg  81 mg Oral DAILY Polo Trammell M.D.   81 mg at 10/19/19 0508   • insulin regular (HUMULIN R) injection 1-6 Units  1-6 Units Subcutaneous 4X/DAY CYNDY Trammell M.D.        And   • glucose 4 g chewable tablet 16 g  16 g Oral Q15 MIN PRN Polo Trammell M.D.        And   • DEXTROSE 10% BOLUS 250 mL  250 mL Intravenous Q15 MIN PRN Polo Trammell M.D.           Physical Exam:  Vitals:    10/18/19 2331 10/19/19 0009 10/19/19 0320 10/19/19 0755   BP: 100/64 128/66 117/57 115/71   Pulse: 95 (!) 108 (!) 107 91   Resp:  16 16 16   Temp:  36.7 °C (98 °F) 36.3 °C (97.3 °F) 36.2 °C (97.2 °F)   TempSrc:  Temporal Temporal Temporal   SpO2: 97% 97% 95% 97%   Weight:  91.3 kg (201 lb 4.5 oz)     Height:         General appearance: NAD, conversant   Eyes: anicteric sclerae, moist conjunctivae; no  lid-lag; PERRLA  HENT: Atraumatic; oropharynx clear with moist mucous membranes and no mucosal ulcerations; normal hard and soft palate  Neck: Trachea midline; FROM, supple, no thyromegaly or lymphadenopathy  Lungs: CTA, with normal respiratory effort and no intercostal retractions  CV: irregularly irregular, no MRGs, no JVD   Abdomen: Soft, non-tender; no masses or HSM  Extremities: No peripheral edema or extremity lymphadenopathy  Skin: Normal temperature, turgor and texture; no rash, ulcers or subcutaneous nodules  Psych: Appropriate affect, alert and oriented to person, place and time    Data:  Labs reviewed    Prior echo/stress results reviewed:  Asymmetric septal hypertrophy.  Left ventricular ejection fraction is 50% to 55%.  Mildly dilated left atrium.  Mild mitral regurgitation.  Small pericardial effusion without evidence of hemodynamic compromise.    MR dick 2011:  Acute lacunar infarct in the left globus pallidus/internal capsule.    Two nonacute lacunar infarcts are present in the mle.    Several small foci of increased T2 signal in the subcortical white matter, nonspecific, probably indicating small vessel disease..    Numerous enlarged perivascular spaces in the subcortical white matter and basal ganglia, possibly also indicating small vessel disease    EKG interpreted by me:   AF with RVR    Impression/Plan:  1)Subacute CVA  2)AF w RVR  3)Chest pain    -I agree with trying to control her rate, would avoid cardioverting her now blindly as she may still have residual thrombus  -Although she is out of any intervention window for her stroke, I would still work her up including MR brain, MRA head/neck  -I will order repeat echo and MPI  -Agree with anticoagulation, would transition her to DOAC  -If she remains in persistent AF after a few weeks, we can plan for elective cardioversion after 4 weeks OAC  -Afterwards, I think reasonable to add antiarrhythmic therapy (IC agent if stress negative) for  symptomatic AF  -If she self converts before then we can add antiarrhythmic therapy     Dallas Hayden MD

## 2019-10-19 NOTE — ED NOTES
Medication reconciliation updated and complete per pt at bedside  Allergies have been verified and updated   No oral ABX within the last 14 days  Pt home pharmacy:[facility]

## 2019-10-19 NOTE — PROGRESS NOTES
Pt back to floor from MRI. Tele monitor placed and monitor room notified. MRI was not able to be completed due to pt claustrophobia. Pt was premedicated with 0.5mg Ativan. MD notified.

## 2019-10-19 NOTE — DISCHARGE PLANNING
[facility] Transitional Care Coordination        Referral from:  Vitaliy Pena MD  Facesheet indicates: [facility]  Potential Rehab Diagnosis:  Stroke work up pending.     D/C support: Will need to confirm; lives in [place], [place]; works full time.      Physiatry consultation Pended per protocol.        Thank you for referral.  Will continue to follow.

## 2019-10-19 NOTE — H&P
Hospital Medicine History & Physical Note    Date of Service  10/18/2019    Primary Care Physician  Pcp Pt States None    Consultants  None    Code Status  Full code    Chief Complaint  Chest tightness    History of Presenting Illness  53 y.o. female with prior history of diabetes mellitus not currently on medication regimen, and recent cerebrovascular accident with now Broca's aphasia, was in her usual state of health until the day prior to admission.  She reports the onset of chest tightness, this was without exacerbating or alleviating factors and, and did not radiate anywhere.  She denies palpitations.  She has no other symptoms including shortness of breath, abdominal pain, nausea vomiting diarrhea or constipation.  No recent fever or chills.    Review of Systems  Review of Systems   Constitutional: Negative.    HENT: Negative.    Eyes: Negative.    Respiratory: Negative.    Cardiovascular: Positive for chest pain.   Gastrointestinal: Negative.    Genitourinary: Negative.    Musculoskeletal: Negative.    Skin: Negative.    Neurological: Negative.    Endo/Heme/Allergies: Negative.    Psychiatric/Behavioral: Negative.        Past Medical History   has a past medical history of Pneumonia (1975).    Surgical History   has a past surgical history that includes recovery (6/2/2011).     Family History  family history includes Heart Disease in her father and mother.     Social History   reports that she has been smoking cigarettes. She has a 10.00 pack-year smoking history. She has never used smokeless tobacco. She reports that she drinks alcohol. She reports that she does not use drugs.    Allergies  Allergies   Allergen Reactions   • Bee      Swelling and airway closure.   • Latex      Swelling, redness, itching.   • Strawberry      itching       Medications  Prior to Admission Medications   Prescriptions Last Dose Informant Patient Reported? Taking?   amlodipine (NORVASC) 10 MG TABS   No No   Sig: Take 1 Tab by  mouth every day.   aspirin  MG TBEC   No No   Sig: Take 1 Tab by mouth every day.   atorvastatin (LIPITOR) 10 MG TABS   No No   Sig: Take 1 Tab by mouth every evening.   glyBURIDE (DIABETA) 2.5 MG TABS   No No   Sig: Take 1 Tab by mouth 2 times a day, with meals.   lisinopril (PRINIVIL) 10 MG TABS   No No   Sig: Take 2 Tabs by mouth every day.   metformin (GLUCOPHAGE) 500 MG TABS   No No   Sig: Take 1 Tab by mouth 2 times a day, with meals.   omeprazole (PRILOSEC) 20 MG CPDR   No No   Sig: Take 1 Cap by mouth every day.      Facility-Administered Medications: None       Physical Exam  Temp:  [36.1 °C (97 °F)] 36.1 °C (97 °F)  Pulse:  [109-152] 113  Resp:  [18] 18  BP: (100-124)/(55-75) 117/74  SpO2:  [96 %-97 %] 96 %    Physical Exam   Constitutional: She is oriented to person, place, and time. She appears well-developed and well-nourished. No distress.   HENT:   Head: Normocephalic and atraumatic.   Eyes: Pupils are equal, round, and reactive to light. Conjunctivae are normal.   Neck: Normal range of motion. Neck supple. No tracheal deviation present. No thyromegaly present.   Cardiovascular: Normal rate, regular rhythm and normal heart sounds. Exam reveals no gallop and no friction rub.   No murmur heard.  Pulmonary/Chest: Effort normal and breath sounds normal. No respiratory distress. She has no wheezes. She has no rales.   Abdominal: Soft. Bowel sounds are normal. She exhibits no distension. There is no tenderness. There is no rebound.   Musculoskeletal: Normal range of motion. She exhibits no edema.   Lymphadenopathy:     She has no cervical adenopathy.   Neurological: She is alert and oriented to person, place, and time. No cranial nerve deficit.   Word finding difficulty, fluency issue.    Skin: Skin is warm and dry. She is not diaphoretic.   Psychiatric: She has a normal mood and affect.   Nursing note and vitals reviewed.      Laboratory:  Recent Labs     10/18/19  2145   WBC 6.6   RBC 3.98*    HEMOGLOBIN 12.2   HEMATOCRIT 38.1   MCV 95.7   MCH 30.7   MCHC 32.0*   RDW 51.1*   PLATELETCT 262   MPV 11.4     Recent Labs     10/18/19  2145   SODIUM 134*   POTASSIUM 3.2*   CHLORIDE 102   CO2 23   GLUCOSE 196*   BUN 29*   CREATININE 1.70*   CALCIUM 9.1     Recent Labs     10/18/19  2145   ALTSGPT 10   ASTSGOT 13   ALKPHOSPHAT 97   TBILIRUBIN 0.7   GLUCOSE 196*         No results for input(s): NTPROBNP in the last 72 hours.      Recent Labs     10/18/19  2145   TROPONINT 26*       Urinalysis:    No results found     Imaging:  OUTSIDE IMAGES-CT HEAD   Final Result      OUTSIDE IMAGES-DX CHEST   Final Result      EC-ECHOCARDIOGRAM COMPLETE W/O CONT    (Results Pending)         Assessment/Plan:  I anticipate this patient will require at least two midnights for appropriate medical management, necessitating inpatient admission.    * AF (atrial fibrillation) ([facility])  Assessment & Plan  With rapid ventricular rate.  Plan for echocardiogram, telemetry monitoring, and ongoing diltiazem infusion at 10 mg/h.  This may represent paroxysmal disease, as it is a new diagnosis to the patient.    Broca's aphasia  Assessment & Plan  Apparently recently had a cerebrovascular accident with resultant Broca's aphasia.  No other clear symptoms.  Monitor.    Class 2 obesity due to excess calories without serious comorbidity with body mass index (BMI) of 36.0 to 36.9 in adult  Assessment & Plan  Body mass index is 36.21 kg/m².      JORGE (acute kidney injury) ([facility])  Assessment & Plan  Unclear etiology, check urine studies.  Suspect at least in part due to dehydration.  Will hydrate, monitor.    Type 2 diabetes mellitus with hyperglycemia, without long-term current use of insulin ([facility])  Assessment & Plan  Not currently on medication regimen.  Patient reports that she checks her blood sugar every other day and has been normal.  Currently she has hyperglycemia, we will monitor on correction dose insulin therapy.        VTE prophylaxis: SCD,  heparin

## 2019-10-19 NOTE — ED TRIAGE NOTES
"Chief Complaint   Patient presents with   • Atrial Fibrillation     pt was in afib RVR at outside facility [place] the [place]   • Sent by MD     transfer from [facility]     Pt BIB EMS for above. Pt denies any hx of afib and denies taking any medications for it. Pt received 20mg and then 30mg cardizem at outside facility and then was started on a drip at 5mg/hr. En route pt was increased to 10mg/hr on the drip with hr in  per EMS. Pt was having abnormal speech for about 2 weeks with worsening over the last 3 days. Pt reports improvement in her slurred speech but still feels \"off\". Pt has hx of stroke in 2014.   FSBS 223 per EMS.   "

## 2019-10-19 NOTE — PROGRESS NOTES
Hospital Medicine Daily Progress Note    Date of Service: 10/19/2019 Code Status: Full Code     Chief Complaint  Chief Complaint   Patient presents with   • Atrial Fibrillation     pt was in afib RVR at outside facility in the 170[place]   • Sent by MD     transfer from [facility]       Consultants/Specialty: Cardiology Disposition: Remain IP     Hospital Course     ER and Admission Day course  53 y.o. female with prior history of diabetes mellitus not currently on medication regimen, and recent cerebrovascular accident with now Broca's aphasia, was in her usual state of health until the day prior to admission.  She reports the onset of chest tightness, this was without exacerbating or alleviating factors and, and did not radiate anywhere.  She denies palpitations.  She has no other symptoms including shortness of breath, abdominal pain, nausea vomiting diarrhea or constipation.  No recent fever or chills  10/19 - Patient had an untreated CVA 3 days ago, likely from undiagnosed a.fib. continues to be in a.fib, on cardizem drip.  Consulted cardiology.  Stroke pathway/order set placed, neurology consulted CT head shows stable L MCA, MRIs pending.       Interval Problem Update  Patient was seen and evaluated today for new a.fib sent from [facility]      Review of Systems  Review of Systems   Constitutional: Negative for fever and malaise/fatigue.   Respiratory: Negative for cough and shortness of breath.    Cardiovascular: Positive for palpitations. Negative for chest pain.   Skin: Negative for itching and rash.   Psychiatric/Behavioral: Negative for depression. The patient is not nervous/anxious.     Physical Exam  Physical Exam   Constitutional: No distress.   Cardiovascular:   IRIR   Neurological: She is alert.   Skin: Skin is warm and dry. She is not diaphoretic.   Nursing note and vitals reviewed.       I/Os    Intake/Output Summary (Last 24 hours) at 10/19/2019 0700  Last data filed at 10/19/2019 0619  Gross per  24 hour   Intake 1011.95 ml   Output 300 ml   Net 711.95 ml    Vital Signs  Temp:  [36.1 °C (97 °F)-36.7 °C (98 °F)] 36.3 °C (97.3 °F)  Pulse:  [] 107  Resp:  [16-18] 16  BP: (100-128)/(55-75) 117/57  SpO2:  [95 %-97 %] 95 %     Laboratory  Results from last 7 days   Lab Units 10/19/19  0543 10/18/19  2145   WBC 1501 K/uL 5.4 6.6   HGB 1503 g/dL 10.1* 12.2   HCT 1504 % 33.8* 38.1   PLATELET COUNT 1518 K/uL 208 262    Results from last 7 days   Lab Units 10/19/19  0543 10/18/19  2145   SODIUM 101 mmol/L 134* 134*   POTASSIUM 102 mmol/L 3.9 3.2*   CHLORIDE 103 mmol/L 103 102   CO2 104 mmol/L 23 23   ANION GAP ANION  8.0 9.0    mg/dL 25* 29*   CREATININE 109 mg/dL 1.58* 1.70*   CALCIUM 105 mg/dL 8.4* 9.1   GLUCOSE 112 mg/dL 140* 196*   IF DOMINGO AMER 010805 mL/min/1.73 m 2 41* 38*   IF NON  AMER 109GFRB mL/min/1.73 m 2 34* 31*           Medications    Scheduled medications:  senna-docusate 2 Tab Oral BID   aspirin EC 81 mg Oral DAILY   insulin regular 1-6 Units Subcutaneous 4X/DAY ACHS   heparin 5,000 Units Subcutaneous Q8HRS        Medications were reviewed today.      Assessment/Plan  * AF (atrial fibrillation) (HCC)- (present on admission)  Assessment & Plan  10/19 - on cardizem drip, consulted cardiology for possible cardioversion, cannot do in the setting of the acute stroke findings.  Transitioning to oral cardizem of 30mg q6h on top of infusion. Continues to be in a.fib.  Troponins stable.  10/18 - adm - With rapid ventricular rate.  Plan for echocardiogram, telemetry monitoring, and ongoing diltiazem infusion at 10 mg/h.  This may represent paroxysmal disease, as it is a new diagnosis to the patient.  Results from last 7 days   Lab Units 10/19/19  0543 10/18/19  2145   TROPONIN T [id number]  ng/L 24* 26*         Cerebral infarction (HCC)- (present on admission)  Assessment & Plan  10/19 - Stroke protocol/order set placed, patient has had expressive aphasia symptoms for 3 days, not 3 weeks.  Has  had dizziness/weird sensations for the last 3 days.  CT head showed Acute LEFT parietal infarct.  MR brain ordered by neurology, CT head here does show stable size from outside facility, Peurology consulted, cannot anti-coagulate for 3 days post stroke, NIH 2.  Patient states she knew things were wrong 3 days ago, but didn't come to the hospital because she was afraid what they'd find.  Really frustrated by the broca' aphasia.    Broca's aphasia- (present on admission)  Assessment & Plan  10/19 - Patient had an untreated stroke 3 days ago, likely originating from previously undiagnosed a.fib, especially in the setting of her A.Fib admission.  10/18 - adm - Apparently recently had a cerebrovascular accident with resultant Broca's aphasia.  No other clear symptoms.  Monitor.    JORGE (acute kidney injury) (HCC)- (present on admission)  Assessment & Plan  10/19 - improving, Continue, possible cause related to a.fib with RVR.  10/18 - adm - Unclear etiology, check urine studies.  Suspect at least in part due to dehydration.  Will hydrate, monitor.  Results from last 7 days   Lab Units 10/19/19  0543 10/18/19  2145   POTASSIUM 102 mmol/L 3.9 3.2*   CREATININE 109 mg/dL 1.58* 1.70*   IF DOMINGO AMER [id number] mL/min/1.73 m 2 41* 38*   IF NON  AMER 109GFRB mL/min/1.73 m 2 34* 31*            Smoker- (present on admission)  Assessment & Plan  10/19 - stable for now.    Class 2 obesity due to excess calories without serious comorbidity with body mass index (BMI) of 36.0 to 36.9 in adult- (present on admission)  Assessment & Plan  Body mass index is 36.21 kg/m².  10/19 - stable  10/18 - adm    Type 2 diabetes mellitus with hyperglycemia, without long-term current use of insulin (HCC)- (present on admission)  Assessment & Plan  10/19 - stable  10/18 - adm - Not currently on medication regimen.  Patient reports that she checks her blood sugar every other day and has been normal.  Currently she has hyperglycemia, we will monitor  on correction dose insulin therapy.  Glycohemoglobin   Date Value Ref Range Status   06/01/2011 6.9 (H) 4.0 - 5.6 % Final     Comment:       Hypokalemia- (present on admission)  Assessment & Plan  10/19 - resolved  Results from last 7 days   Lab Units 10/19/19  0543 10/18/19  2145   POTASSIUM 102 mmol/L 3.9 3.2*   CREATININE 109 mg/dL 1.58* 1.70*   IF DOMINGO AMER [id number] mL/min/1.73 m 2 41* 38*   IF NON  AMER 109GFRB mL/min/1.73 m 2 34* 31*               VTE prophylaxis: heparin drip    Imaging  OUTSIDE IMAGES-CT HEAD   Final Result      OUTSIDE IMAGES-DX CHEST   Final Result      EC-ECHOCARDIOGRAM COMPLETE W/O CONT    (Results Pending)

## 2019-10-19 NOTE — CARE PLAN
Problem: Communication  Goal: The ability to communicate needs accurately and effectively will improve  Outcome: PROGRESSING AS EXPECTED  Note:   Patient educated to utilize call light. Patient and family oriented to hospital room. Patient encouraged to ask questions about plan of care. Patient effectively uses call light and is involved in POC.       Problem: Safety  Goal: Will remain free from injury  Outcome: PROGRESSING AS EXPECTED  Note:   Patient's risk for injury and falls assessed. Appropriate safety precautions in place. Patient educated to utilize call light for needs. Patient verbalizes understanding.    Goal: Will remain free from falls  Outcome: PROGRESSING AS EXPECTED     Problem: Fluid Volume:  Goal: Will maintain balanced intake and output  Outcome: PROGRESSING AS EXPECTED  Note:   Patient is educated to importance of fluid intake. Patient I/O's are monitored. Toileting at scheduled intervals in place. Patient indicates understanding.

## 2019-10-19 NOTE — ASSESSMENT & PLAN NOTE
-Acute on CKD II/III?  -Cr has  Stabilized around 1.5, likely her baseline?  -monitor  -Urine lytes  -renal US normal

## 2019-10-20 PROBLEM — I50.41 ACUTE COMBINED SYSTOLIC AND DIASTOLIC HEART FAILURE (HCC): Status: ACTIVE | Noted: 2019-10-20

## 2019-10-20 PROBLEM — I65.23 BILATERAL CAROTID ARTERY STENOSIS: Status: ACTIVE | Noted: 2019-10-20

## 2019-10-20 LAB
CHOLEST SERPL-MCNC: 117 MG/DL (ref 100–199)
EST. AVERAGE GLUCOSE BLD GHB EST-MCNC: 197 MG/DL
GLUCOSE BLD-MCNC: 157 MG/DL (ref 65–99)
GLUCOSE BLD-MCNC: 183 MG/DL (ref 65–99)
GLUCOSE BLD-MCNC: 201 MG/DL (ref 65–99)
GLUCOSE BLD-MCNC: 213 MG/DL (ref 65–99)
HBA1C MFR BLD: 8.5 % (ref 0–5.6)
HDLC SERPL-MCNC: 23 MG/DL
LDLC SERPL CALC-MCNC: 62 MG/DL
LV EJECT FRACT  99904: 20
LV EJECT FRACT MOD 2C 99903: 40.81
LV EJECT FRACT MOD 4C 99902: 19.63
LV EJECT FRACT MOD BP 99901: 34.74
NT-PROBNP SERPL IA-MCNC: 5058 PG/ML (ref 0–125)
TRIGL SERPL-MCNC: 162 MG/DL (ref 0–149)

## 2019-10-20 PROCEDURE — 700111 HCHG RX REV CODE 636 W/ 250 OVERRIDE (IP): Performed by: INTERNAL MEDICINE

## 2019-10-20 PROCEDURE — 99233 SBSQ HOSP IP/OBS HIGH 50: CPT | Performed by: INTERNAL MEDICINE

## 2019-10-20 PROCEDURE — 700111 HCHG RX REV CODE 636 W/ 250 OVERRIDE (IP): Performed by: HOSPITALIST

## 2019-10-20 PROCEDURE — A9270 NON-COVERED ITEM OR SERVICE: HCPCS | Performed by: NURSE PRACTITIONER

## 2019-10-20 PROCEDURE — 99233 SBSQ HOSP IP/OBS HIGH 50: CPT | Mod: 25 | Performed by: STUDENT IN AN ORGANIZED HEALTH CARE EDUCATION/TRAINING PROGRAM

## 2019-10-20 PROCEDURE — 770020 HCHG ROOM/CARE - TELE (206)

## 2019-10-20 PROCEDURE — 700105 HCHG RX REV CODE 258: Performed by: HOSPITALIST

## 2019-10-20 PROCEDURE — 36415 COLL VENOUS BLD VENIPUNCTURE: CPT

## 2019-10-20 PROCEDURE — 83880 ASSAY OF NATRIURETIC PEPTIDE: CPT

## 2019-10-20 PROCEDURE — 700102 HCHG RX REV CODE 250 W/ 637 OVERRIDE(OP): Performed by: NURSE PRACTITIONER

## 2019-10-20 PROCEDURE — A9270 NON-COVERED ITEM OR SERVICE: HCPCS | Performed by: INTERNAL MEDICINE

## 2019-10-20 PROCEDURE — 80061 LIPID PANEL: CPT

## 2019-10-20 PROCEDURE — 99406 BEHAV CHNG SMOKING 3-10 MIN: CPT | Performed by: STUDENT IN AN ORGANIZED HEALTH CARE EDUCATION/TRAINING PROGRAM

## 2019-10-20 PROCEDURE — A9270 NON-COVERED ITEM OR SERVICE: HCPCS | Performed by: STUDENT IN AN ORGANIZED HEALTH CARE EDUCATION/TRAINING PROGRAM

## 2019-10-20 PROCEDURE — 97161 PT EVAL LOW COMPLEX 20 MIN: CPT

## 2019-10-20 PROCEDURE — 700102 HCHG RX REV CODE 250 W/ 637 OVERRIDE(OP): Performed by: STUDENT IN AN ORGANIZED HEALTH CARE EDUCATION/TRAINING PROGRAM

## 2019-10-20 PROCEDURE — 700105 HCHG RX REV CODE 258: Performed by: NURSE PRACTITIONER

## 2019-10-20 PROCEDURE — 99231 SBSQ HOSP IP/OBS SF/LOW 25: CPT | Performed by: PSYCHIATRY & NEUROLOGY

## 2019-10-20 PROCEDURE — 700111 HCHG RX REV CODE 636 W/ 250 OVERRIDE (IP): Performed by: NURSE PRACTITIONER

## 2019-10-20 PROCEDURE — 700102 HCHG RX REV CODE 250 W/ 637 OVERRIDE(OP): Performed by: INTERNAL MEDICINE

## 2019-10-20 PROCEDURE — 82962 GLUCOSE BLOOD TEST: CPT

## 2019-10-20 RX ORDER — FUROSEMIDE 10 MG/ML
40 INJECTION INTRAMUSCULAR; INTRAVENOUS ONCE
Status: COMPLETED | OUTPATIENT
Start: 2019-10-20 | End: 2019-10-20

## 2019-10-20 RX ORDER — LOSARTAN POTASSIUM 25 MG/1
12.5 TABLET ORAL
Status: DISCONTINUED | OUTPATIENT
Start: 2019-10-20 | End: 2019-10-22

## 2019-10-20 RX ORDER — FUROSEMIDE 10 MG/ML
20 INJECTION INTRAMUSCULAR; INTRAVENOUS
Status: DISCONTINUED | OUTPATIENT
Start: 2019-10-21 | End: 2019-10-21

## 2019-10-20 RX ORDER — POTASSIUM CHLORIDE 20 MEQ/1
40 TABLET, EXTENDED RELEASE ORAL ONCE
Status: COMPLETED | OUTPATIENT
Start: 2019-10-20 | End: 2019-10-20

## 2019-10-20 RX ORDER — SPIRONOLACTONE 25 MG/1
25 TABLET ORAL
Status: DISCONTINUED | OUTPATIENT
Start: 2019-10-20 | End: 2019-10-22

## 2019-10-20 RX ORDER — ASPIRIN 81 MG/1
81 TABLET, CHEWABLE ORAL DAILY
Status: DISCONTINUED | OUTPATIENT
Start: 2019-10-21 | End: 2019-10-26 | Stop reason: HOSPADM

## 2019-10-20 RX ADMIN — SPIRONOLACTONE 25 MG: 25 TABLET ORAL at 15:11

## 2019-10-20 RX ADMIN — FUROSEMIDE 40 MG: 10 INJECTION, SOLUTION INTRAMUSCULAR; INTRAVENOUS at 17:23

## 2019-10-20 RX ADMIN — SODIUM CHLORIDE, POTASSIUM CHLORIDE, SODIUM LACTATE AND CALCIUM CHLORIDE: 600; 310; 30; 20 INJECTION, SOLUTION INTRAVENOUS at 03:21

## 2019-10-20 RX ADMIN — APIXABAN 5 MG: 5 TABLET, FILM COATED ORAL at 09:05

## 2019-10-20 RX ADMIN — DILTIAZEM HYDROCHLORIDE 10 MG/HR: 5 INJECTION INTRAVENOUS at 07:45

## 2019-10-20 RX ADMIN — INSULIN HUMAN 2 UNITS: 100 INJECTION, SOLUTION PARENTERAL at 21:13

## 2019-10-20 RX ADMIN — ASPIRIN 325 MG: 325 TABLET, FILM COATED ORAL at 05:21

## 2019-10-20 RX ADMIN — DILTIAZEM HYDROCHLORIDE 5 MG/HR: 5 INJECTION INTRAVENOUS at 14:30

## 2019-10-20 RX ADMIN — INSULIN HUMAN 2 UNITS: 100 INJECTION, SOLUTION PARENTERAL at 18:29

## 2019-10-20 RX ADMIN — POTASSIUM CHLORIDE 40 MEQ: 1500 TABLET, EXTENDED RELEASE ORAL at 17:23

## 2019-10-20 RX ADMIN — DILTIAZEM HYDROCHLORIDE 30 MG: 30 TABLET, FILM COATED ORAL at 12:46

## 2019-10-20 RX ADMIN — METOPROLOL TARTRATE 25 MG: 25 TABLET, FILM COATED ORAL at 15:11

## 2019-10-20 RX ADMIN — ATORVASTATIN CALCIUM 40 MG: 40 TABLET, FILM COATED ORAL at 17:23

## 2019-10-20 RX ADMIN — INSULIN HUMAN 1 UNITS: 100 INJECTION, SOLUTION PARENTERAL at 12:45

## 2019-10-20 RX ADMIN — APIXABAN 5 MG: 5 TABLET, FILM COATED ORAL at 17:23

## 2019-10-20 RX ADMIN — LOSARTAN POTASSIUM 12.5 MG: 25 TABLET ORAL at 18:34

## 2019-10-20 RX ADMIN — DILTIAZEM HYDROCHLORIDE 30 MG: 30 TABLET, FILM COATED ORAL at 05:21

## 2019-10-20 ASSESSMENT — GAIT ASSESSMENTS
DISTANCE (FEET): 150
DEVIATION: BRADYKINETIC
GAIT LEVEL OF ASSIST: SUPERVISED

## 2019-10-20 ASSESSMENT — COGNITIVE AND FUNCTIONAL STATUS - GENERAL
MOBILITY SCORE: 24
SUGGESTED CMS G CODE MODIFIER MOBILITY: CH

## 2019-10-20 ASSESSMENT — ENCOUNTER SYMPTOMS
COUGH: 0
PALPITATIONS: 1
APNEA: 0
SHORTNESS OF BREATH: 0
CHEST TIGHTNESS: 0
DEPRESSION: 0
STRIDOR: 0
FEVER: 0
NERVOUS/ANXIOUS: 0
CHOKING: 0

## 2019-10-20 NOTE — PROGRESS NOTES
Cardiology Follow Up Progress Note    Date of Service  10/20/2019    Attending Physician  Meño Pena D.O.        Admitted with 24 hours onset of difficulty with speech and finding words, in ER she was found to be in A. fib RVR, CT head demonstrated acute left MCA    Cardiology consultation for new diagnosis of A. fib RVR      History of obesity, type 2 diabetes on no medical therapy      Labs reviewed  TSH normal, sodium 134, potassium 3.9, creatinine 1.58 (1.7 on admit), 0.8 42,011, triglycerides 162, troponin peaked at 24      Blood pressure 1/24/1974  Heart rate 80s atrial fibrillation      Interim Events    10/20/19 no overnight cardiac event, remains in A. fib, rate controlled on diltiazem drip, will try to wean her off of diltiazem drip, start low-dose metoprolol every 8 hours for rate control, severe cardiomyopathy noted on echocardiogram, discussed with patient, questions answered, plan for goal-directed medical therapy, likely needs ischemic work-up prior to discharge  Review of Systems  Review of Systems   Respiratory: Negative for apnea, cough, choking, chest tightness, shortness of breath and stridor.    Cardiovascular: Negative for chest pain and leg swelling.       Vital signs in last 24 hours  Temp:  [35.8 °C (96.4 °F)-36.8 °C (98.3 °F)] 36.6 °C (97.9 °F)  Pulse:  [62-94] 75  Resp:  [16-18] 17  BP: (102-124)/(64-84) 124/74  SpO2:  [92 %-99 %] 96 %    Physical Exam  Physical Exam   Constitutional: She is oriented to person, place, and time.   HENT:   Head: Normocephalic.   Eyes: Conjunctivae are normal.   Neck: No thyromegaly present.   Cardiovascular: Normal rate. An irregularly irregular rhythm present.   Pulses:       Carotid pulses are 2+ on the right side, and 2+ on the left side.       Radial pulses are 2+ on the right side, and 2+ on the left side.   Pulmonary/Chest: She has no wheezes.   Abdominal: Soft.   Musculoskeletal: She exhibits no edema.   Neurological: She is alert and oriented  to person, place, and time.   Skin: Skin is warm and dry.       Lab Review  Lab Results   Component Value Date/Time    WBC 5.4 10/19/2019 05:43 AM    RBC 3.45 (L) 10/19/2019 05:43 AM    HEMOGLOBIN 10.1 (L) 10/19/2019 05:43 AM    HEMATOCRIT 33.8 (L) 10/19/2019 05:43 AM    MCV 98.0 (H) 10/19/2019 05:43 AM    MCH 29.3 10/19/2019 05:43 AM    MCHC 29.9 (L) 10/19/2019 05:43 AM    MPV 11.2 10/19/2019 05:43 AM      Lab Results   Component Value Date/Time    SODIUM 134 (L) 10/19/2019 05:43 AM    POTASSIUM 3.9 10/19/2019 05:43 AM    CHLORIDE 103 10/19/2019 05:43 AM    CO2 23 10/19/2019 05:43 AM    GLUCOSE 140 (H) 10/19/2019 05:43 AM    BUN 25 (H) 10/19/2019 05:43 AM    CREATININE 1.58 (H) 10/19/2019 05:43 AM      Lab Results   Component Value Date/Time    ASTSGOT 13 10/18/2019 09:45 PM    ALTSGPT 10 10/18/2019 09:45 PM     Lab Results   Component Value Date/Time    CHOLSTRLTOT 117 10/20/2019 02:59 AM    LDL 62 10/20/2019 02:59 AM    HDL 23 (A) 10/20/2019 02:59 AM    TRIGLYCERIDE 162 (H) 10/20/2019 02:59 AM    TROPONINT 22 (H) 10/19/2019 11:36 AM       No results for input(s): NTPROBNP in the last 72 hours.    Cardiac Imaging and Procedures Review      EKG: A. fib RVR, rate 120s, 10/18/19    Echocardiogram: 10/19/19, LVEF 20%, thrombus is not observed in the LV apex, dilated right ventricle, enlarged right atrium, severely dilated left atrium, RVSP 40 mmHg                Assessment/Plan  A. fib RVR, new, on Dilt gtt,  wean off  Severe cardiomyopathy, new diagnosis, EF 20%, thrombus is not observed in the LV apex based on echo. GDMT  Subacute CVA, left MCA based on CT head likely embolic, appreciate neurology.  Start of apixaban 5 mg twice daily  Aspirin 81, Lipitor 40mg  Metoprolol 25 mg every Q8 h for rate control, switch to Toprol-XL on discharge.  Consider start of low-dose losartan and spironolactone.  Needs ischemic evaluation prior to discharge.  BMP, Mg in am, pro BNP from todays lab, will order  Watch Cr  Will follow                Please contact me with any questions.    MATTI Taveras.   Cardiologist, [facility]  [phone]

## 2019-10-20 NOTE — THERAPY
"Physical Therapy Evaluation completed.   Bed Mobility:  Supine to Sit: Supervised  Transfers: Sit to Stand: Supervised  Gait: Level Of Assist: Supervised with No Equipment Needed       Plan of Care: Patient with no further skilled PT needs in the acute care setting at this time.  Will return for D/C needs only following any procedure. Pt shantalo's safe mobility at this time.   Discharge Recommendations: Equipment: No Equipment Needed. Post-acute therapy Discharge to home with outpatient or home health for additional skilled therapy services.    Pt is a 54 y/o woman admit A-Fib with RVR. Nsg states pt was going to D/C home tomorrow, but waiting on POC due to low heart function. possible cath lab, stress test today was cancelled. At this point , pt demo's safe and steady gait x 150 feet without AD and supervision. POC still pending. Will keep pt on service if any D/C needs arise following any procedure. Pt at this time is safe with mobility . LK      See \"Rehab Therapy-Acute\" Patient Summary Report for complete documentation.     "

## 2019-10-20 NOTE — ASSESSMENT & PLAN NOTE
10/21 - Stable.  10/20 - discussion of smoking cessation ~4 minutes about her smoking as a exacerbating factor to cause her stroke, patient amenable, interested in resources  10/19 - stable for now.

## 2019-10-20 NOTE — CONSULTS
"CC:  \"stroke\"    Date of Admission: 10/18/2019    Today's Date: 10/19/19    Requesting  Physician: Meño Pena D.O.      Last Known Well: 2 Days ago.  Not a TPA or IR candidate due to extended windows          HPI:    Judith Vallecillo is a 53 y.o. female R handed, who was admitted yesterday with 24hr of onset of difficulties with language and finding words, she states this is her first event with this.  She denies any headaches, but was found in the ER with RR AFIB which she was not aware.            HISTORIES AS ABOVE ARE INCOMPLETE IF PATIENT IS INTUBATED, OR UNABLE TO COMMUNICATE OR ELUCIDATE.    ROS:   Constitutional: No fevers or chills.  Eyes: No blurry vision or eye pain.  ENT: No dysphagia or hearing loss.  Respiratory+cough , mild  shortness of breath.  Cardiovascular: No chest pain or palpitations.  GI: No nausea, vomiting, or diarrhea.  : No urinary incontinence or dysuria.  Musculoskeletal: No joint swelling or arthralgias.  Skin: No skin rashes.  Neuro: See HPI.  Endocrine: No heat or cold intolerance. No polydipsia or polyuria.  Psych: No depression or anxiety.  Heme/Lymph: No easy bruising or swollen lymph nodes.  All other systems were reviewed and were negative.     All other systems were reviewed and were negative.      Past Medical History:   Past Medical History:   Diagnosis Date   • Pneumonia 1975    as a child       Past Surgical History:   Past Surgical History:   Procedure Laterality Date   • RECOVERY  6/2/2011    Performed by SURGERY, SANDR[facility] at [facility]       Social History:   Social History     Socioeconomic History   • Marital status: Single     Spouse name: Not on file   • Number of children: Not on file   • Years of education: Not on file   • Highest education level: Not on file   Occupational History   • Not on file   Social Needs   • Financial resource strain: Not on file   • Food insecurity:     Worry: Not on file     Inability: Not on file   • " Transportation needs:     Medical: Not on file     Non-medical: Not on file   Tobacco Use   • Smoking status: Current Every Day Smoker     Packs/day: 0.50     Years: 20.00     Pack years: 10.00     Types: Cigarettes   • Smokeless tobacco: Never Used   Substance and Sexual Activity   • Alcohol use: Not Currently     Frequency: Never     Comment: very rare drink of a beer or wine   • Drug use: No   • Sexual activity: Not on file   Lifestyle   • Physical activity:     Days per week: Not on file     Minutes per session: Not on file   • Stress: Not on file   Relationships   • Social connections:     Talks on phone: Not on file     Gets together: Not on file     Attends Islam service: Not on file     Active member of club or organization: Not on file     Attends meetings of clubs or organizations: Not on file     Relationship status: Not on file   • Intimate partner violence:     Fear of current or ex partner: Not on file     Emotionally abused: Not on file     Physically abused: Not on file     Forced sexual activity: Not on file   Other Topics Concern   • Not on file   Social History Narrative   • Not on file       Family History:   Family History   Problem Relation Age of Onset   • Heart Disease Mother    • Heart Disease Father        Allergies:   Allergies   Allergen Reactions   • Bee Anaphylaxis     Swelling and airway closure.   • Latex Itching and Swelling     Swelling, redness, itching.   • Strawberry Itching     itching         Current Facility-Administered Medications:   •  DILTIAZem (CARDIZEM) tablet 30 mg, 30 mg, Oral, Q6HRS, Meño Pena DJOE., 30 mg at 10/19/19 1302  •  aspirin (ASA) tablet 325 mg, 325 mg, Oral, DAILY, Stopped at 10/19/19 1045 **OR** aspirin (ASA) chewable tab 324 mg, 324 mg, Oral, DAILY **OR** aspirin (ASA) suppository 300 mg, 300 mg, Rectal, DAILY, Meño Pena D.O.  •  atorvastatin (LIPITOR) tablet 40 mg, 40 mg, Oral, Q EVENING, Meño Pena D.O.  •  acetaminophen (TYLENOL)  tablet 650 mg, 650 mg, Oral, Q6HRS PRN, Polo Trammell M.D.  •  cloNIDine (CATAPRES) tablet 0.1 mg, 0.1 mg, Oral, Q6HRS PRN, Polo Trammell M.D.  •  ondansetron (ZOFRAN) syringe/vial injection 4 mg, 4 mg, Intravenous, Q4HRS PRN, Polo Trammell M.D.  •  ondansetron (ZOFRAN ODT) dispertab 4 mg, 4 mg, Oral, Q4HRS PRN, Polo Trammell M.D.  •  promethazine (PHENERGAN) tablet 12.5-25 mg, 12.5-25 mg, Oral, Q4HRS PRN, Polo Trammell M.D.  •  promethazine (PHENERGAN) suppository 12.5-25 mg, 12.5-25 mg, Rectal, Q4HRS PRN, Polo Trammell M.D.  •  prochlorperazine (COMPAZINE) injection 5-10 mg, 5-10 mg, Intravenous, Q4HRS PRN, Polo Trammell M.D.  •  senna-docusate (PERICOLACE or SENOKOT S) 8.6-50 MG per tablet 2 Tab, 2 Tab, Oral, BID **AND** polyethylene glycol/lytes (MIRALAX) PACKET 1 Packet, 1 Packet, Oral, QDAY PRN **AND** magnesium hydroxide (MILK OF MAGNESIA) suspension 30 mL, 30 mL, Oral, QDAY PRN **AND** bisacodyl (DULCOLAX) suppository 10 mg, 10 mg, Rectal, QDAY PRN, Polo Trammell M.D.  •  lactated ringers infusion, , Intravenous, Continuous, Polo Trammell M.D., Last Rate: 83 mL/hr at 10/19/19 1302  •  DILTIAZem (CARDIZEM) 100 mg in D5W 100 mL Infusion, 10 mg/hr, Intravenous, Continuous, Polo Trammell M.D., Last Rate: 10 mL/hr at 10/19/19 1100, 10 mg/hr at 10/19/19 1100  •  insulin regular (HUMULIN R) injection 1-6 Units, 1-6 Units, Subcutaneous, 4X/DAY ACHS, 1 Units at 10/19/19 1336 **AND** Accu-Chek ACHS, , , Q AC AND BEDTIME(S) **AND** NOTIFY MD and PharmD, , , Once **AND** glucose 4 g chewable tablet 16 g, 16 g, Oral, Q15 MIN PRN **AND** DEXTROSE 10% BOLUS 250 mL, 250 mL, Intravenous, Q15 MIN PRN, Polo Trammell M.D.      PHYSICAL EXAM    Vitals:    10/19/19 0320 10/19/19 0755 10/19/19 1200 10/19/19 1600   BP: 117/57 115/71 107/65 124/81   Pulse: (!) 107 91 91 64   Resp: 16 16 16 16   Temp: 36.3 °C (97.3 °F) 36.2 °C (97.2 °F) 36.4 °C (97.5 °F) (!) 35.8 °C (96.4 °F)   TempSrc: Temporal Temporal  Temporal Temporal   SpO2: 95% 97% 98% 95%   Weight:       Height:           Constitutional: lying in bed, resting comfortable     Head/Neck: NCAT. no meningismus neg kernig neg brudzinski. No obvious mass or heard bruit. No tender arteries or lost pulses. No rash of head or neck.    Cardiovascular: Regular, rhythmic    Pulmonary: Normal breath sounds    Extremities: No edema, normal pulses    Skin: Warm, dry, intact. No rashes observed head/neck or body  No stigmata    Eyes/Funduscopic: normal conjunctiva     Psych:Congruent    Mental Status: Awake, alert, oriented x 3. Word naming difficulties.  Cranial Nerves: CN II-XII intact. Pupillary reflex + 4  No afferent pupillary defect. EOM full;  VF full; No nystagmus.       Motor:  5/5, decreased dexterity with the R hand     Sensory: symmetric to all modalities.  Coordination: dysmetria absent    DTR's: +2 ;  no clonus.    Babinski: neg    Gait/Station: deferred.       NIHSS:     1a: level of conciousness0  1b:month/age1  1c:open eyes/close hand0  2. Best gaze0  3:Visual fields0  4: facial palsy0  5: a motor left arm0  5 b: motor right arm1  6 a: motor left leg0  6 b : motor right leg0  7: limb ataxia0  8: sensory0  9: best language1  10: dysarthria0  11: extinction /neglect:0     Total: 2        Labs:  Recent Labs     10/18/19  2145 10/19/19  0543   WBC 6.6 5.4   RBC 3.98* 3.45*   HEMOGLOBIN 12.2 10.1*   HEMATOCRIT 38.1 33.8*   MCV 95.7 98.0*   MCH 30.7 29.3   MCHC 32.0* 29.9*   RDW 51.1* 53.0*   PLATELETCT 262 208   MPV 11.4 11.2     Recent Labs     10/18/19  2145 10/19/19  0543   SODIUM 134* 134*   POTASSIUM 3.2* 3.9   CHLORIDE 102 103   CO2 23 23   GLUCOSE 196* 140*   BUN 29* 25*   CREATININE 1.70* 1.58*   CALCIUM 9.1 8.4*     Recent Labs     10/19/19  1136   APTT 26.5   INR 1.12                 Recent Labs     10/18/19  2145 10/19/19  0543   SODIUM 134* 134*   POTASSIUM 3.2* 3.9   CHLORIDE 102 103   CO2 23 23   GLUCOSE 196* 140*   BUN 29* 25*     Recent Labs      10/18/19  2145 10/19/19  0543   SODIUM 134* 134*   POTASSIUM 3.2* 3.9   CHLORIDE 102 103   CO2 23 23   BUN 29* 25*   CREATININE 1.70* 1.58*   CALCIUM 9.1 8.4*     Recent Labs     10/19/19  1136   APTT 26.5   INR 1.12     Results for orders placed or performed during the hospital encounter of 05/31/11   2-D ECHO WITH BUBBLE STUDY (ECHOCARDIOGRAM COMP W/O CONT)   Result Value Ref Range    Eject.Frac. MOD BP 53.06     Eject.Frac. MOD 4C 55.05     Eject.Frac. MOD 2C 53.87               Imaging: neuroimaging reviewed and directly visualized by me  US-CAROTID DOPPLER BILAT   Final Result      CT-HEAD W/O   Final Result      1.  Acute LEFT parietal infarct again seen, unchanged.   2.  No associated hemorrhage demonstrated.         OUTSIDE IMAGES-CT HEAD   Final Result      OUTSIDE IMAGES-DX CHEST   Final Result      EC-ECHOCARDIOGRAM COMPLETE W/O CONT    (Results Pending)   NM-CARDIAC STRESS TEST    (Results Pending)   MR-BRAIN-W/O    (Results Pending)       Assessment/Plan:      Acute Left MCA ischemic stroke: Per CT head done here.  Most likely embolic given the new onset AFIB, but no cerebral imaging vessels has been done.  Her creatinine is elevated, and CTA would be more risky than MRA head, but she was not able to lie flat for the MRI.  Ativan would be attempted again.  MRI + MRA head     Antiplatelets: ASA + Plavix for now   Statins: Max therapy  Anticoagulation: with DOAC can be started on Monday, if MRI shows no HIT, no need for heparin unless intracardiac thrombus is visualized.  Once the DOAC is started , then antiplatelets will need to be stopped.  DVT: SCD's , Low molecular heparin  Dysphagia screen : bed swallow, or speech     Stroke education, revision of risk factors, and discussion with nursing about my findings were done .    At time of this admission the possible Toast classification is :     ) Large artery atherosclerosis  x ) Cardioembolism   ) Small vessel disease   ) Stroke of undetermined etiology      To clarify , that this classification can change, as more information is gathered during this patient admit to hospital.          Fernanda Kaba M.D.    Clinical Professor, [employer]  Diplomate, Neurology , Vascular Neurology, Neurophysiology

## 2019-10-20 NOTE — DISCHARGE PLANNING
"Transitional Care Coordination  MRI 10/19  \"Moderate size acute area of infarction involving the left posterior temporal, left parietal-occipital, and lateral occipital lobes\"   PT/OT ordered.     Will continue to follow      "

## 2019-10-20 NOTE — CARE PLAN
Problem: Infection  Goal: Will remain free from infection  Outcome: PROGRESSING AS EXPECTED     Problem: Bowel/Gastric:  Goal: Will not experience complications related to bowel motility  Outcome: PROGRESSING AS EXPECTED  Flowsheets (Taken 10/19/2019 0015 by Shahida Mar R.N.)  Last BM: 10/18/19 (PTA, patient self report)

## 2019-10-20 NOTE — CARE PLAN
Problem: Safety  Goal: Will remain free from falls  Outcome: PROGRESSING AS EXPECTED   RN will provide patient education regarding fall risk  RN will provide patient education on the use of his call light  Patient will demonstrate how to use call light  Patient will verbalize appropriate times to use call light    Problem: Knowledge Deficit  Goal: Knowledge of disease process/condition, treatment plan, diagnostic tests, and medications will improve  Outcome: PROGRESSING AS EXPECTED  Pt updated on POC, tests, and medications. Pt verbalizes understanding and has no further questions at this time. Pt educated on calling for any more questions.

## 2019-10-20 NOTE — CARE PLAN
Problem: Communication  Goal: The ability to communicate needs accurately and effectively will improve  10/20/2019 0156 by Shahida Mar R.N.  Outcome: PROGRESSING AS EXPECTED  Note:   Patient educated to utilize call light. Patient and family oriented to hospital room. Patient encouraged to ask questions about plan of care. Patient effectively uses call light and is involved in POC.    10/19/2019 2336 by Shahida Mar R.N.  Outcome: PROGRESSING AS EXPECTED  Note:   Patient educated to utilize call light. Patient and family oriented to hospital room. Patient encouraged to ask questions about plan of care. Patient effectively uses call light and is involved in POC.       Problem: Safety  Goal: Will remain free from injury  10/20/2019 0156 by Shahida Mar R.N.  Outcome: PROGRESSING AS EXPECTED  Note:   Patient's risk for injury and falls assessed. Appropriate safety precautions in place. Patient educated to utilize call light for needs. Patient verbalizes understanding.    10/19/2019 2336 by Shahida Mar R.N.  Outcome: PROGRESSING AS EXPECTED  Note:   Patient's risk for injury and falls assessed. Appropriate safety precautions in place. Patient educated to utilize call light for needs. Patient verbalizes understanding.    Goal: Will remain free from falls  Outcome: PROGRESSING AS EXPECTED     Problem: Venous Thromboembolism (VTW)/Deep Vein Thrombosis (DVT) Prevention:  Goal: Patient will participate in Venous Thrombosis (VTE)/Deep Vein Thrombosis (DVT)Prevention Measures  10/20/2019 0156 by Shahida Mar R.N.  Outcome: PROGRESSING AS EXPECTED  Note:   Patient educated on DVT risks. Patient VS are stable. No signs of DVT. Will continue to monitor for change status.    10/19/2019 2336 by Shahida Mar R.N.  Outcome: PROGRESSING AS EXPECTED  Note:   Patient educated on DVT risks. Patient VS are stable. No signs of DVT. Will continue to monitor for change status.       Problem: Fluid Volume:  Goal:  Will maintain balanced intake and output  Outcome: PROGRESSING AS EXPECTED  Note:   Patient is educated to importance of fluid intake. Patient I/O's are monitored. Toileting at scheduled intervals in place. Patient indicates understanding.

## 2019-10-20 NOTE — PROGRESS NOTES
Spoke with MRI, patient willing to try again for MRI with more premedication.  MRI states they will be ready for patient in about 30-45 minutes. Will premedicate with 1mg IV Ativan 30 minutes prior per physician orders.

## 2019-10-20 NOTE — CARE PLAN
Problem: Communication  Goal: The ability to communicate needs accurately and effectively will improve  Outcome: PROGRESSING AS EXPECTED  Note:   Patient educated to utilize call light. Patient and family oriented to hospital room. Patient encouraged to ask questions about plan of care. Patient effectively uses call light and is involved in POC.       Problem: Safety  Goal: Will remain free from injury  Outcome: PROGRESSING AS EXPECTED  Note:   Patient's risk for injury and falls assessed. Appropriate safety precautions in place. Patient educated to utilize call light for needs. Patient verbalizes understanding.       Problem: Venous Thromboembolism (VTW)/Deep Vein Thrombosis (DVT) Prevention:  Goal: Patient will participate in Venous Thrombosis (VTE)/Deep Vein Thrombosis (DVT)Prevention Measures  Outcome: PROGRESSING AS EXPECTED  Note:   Patient educated on DVT risks. Patient VS are stable. No signs of DVT. Will continue to monitor for change status.       Problem: Fluid Volume:  Goal: Will maintain balanced intake and output  Outcome: PROGRESSING AS EXPECTED  Note:   Patient is educated to importance of fluid intake. Patient I/O's are monitored. Toileting at scheduled intervals in place. Patient indicates understanding.

## 2019-10-20 NOTE — PROGRESS NOTES
Patient had 3.2 second pause (strip placed in chart). /84, HR back up to 90s. Patient remains on Diltiazem drip at 10mg/hr. Dr. Calloway notified, cardiology already following patient. No new orders.

## 2019-10-20 NOTE — PROGRESS NOTES
Patient off floor to MRI with ACLS RN on zoll monitor and Cardizem drip. Tele monitor room notified.

## 2019-10-20 NOTE — PROGRESS NOTES
Bedside report received. Patient sitting up in bed, NIH scale done per stroke protocol. RN assessed pain; patient declines pain present at this time. Call light within reach. Need for bed alarm assessed; patient is at low risk for falls, precautions in place as appropriate.

## 2019-10-20 NOTE — ASSESSMENT & PLAN NOTE
-remains stable, DOAC once cardiac cath done (or not done)-still holding at this time  -no new neurological symptoms  10/19 - Stroke protocol/order set placed, patient has had expressive aphasia symptoms for 3 days, not 3 weeks.  Has had dizziness/weird sensations for the last 3 days.  CT head showed Acute LEFT parietal infarct.  MR brain ordered by neurology, CT head here does show stable size from outside facility.

## 2019-10-20 NOTE — PROGRESS NOTES
"Hospital Medicine Daily Progress Note    Date of Service: 10/20/2019 Code Status: Full Code     Chief Complaint  Chief Complaint   Patient presents with   • Atrial Fibrillation     pt was in afib RVR at outside facility in the 170's   • Sent by MD     transfer from [facility]       Consultants/Specialty: Cardiology, Neurology Disposition: Remain IP     Hospital Course     ER and Admission Day course  53 y.o. female with prior history of diabetes mellitus not currently on medication regimen, and recent cerebrovascular accident with now Broca's aphasia, was in her usual state of health until the day prior to admission.  She reports the onset of chest tightness, this was without exacerbating or alleviating factors and, and did not radiate anywhere.  She denies palpitations.  She has no other symptoms including shortness of breath, abdominal pain, nausea vomiting diarrhea or constipation.  No recent fever or chills  10/19 - Patient had an untreated CVA 3 days ago, likely from undiagnosed a.fib. continues to be in a.fib, on cardizem drip.  Consulted cardiology.  Stroke pathway/order set placed, neurology consulted CT head shows stable L MCA, MRIs pending.  10/20 - MRI overnight - \"Moderate size acute area of infarction involving the left posterior temporal, left parietal-occipital, and lateral occipital lobes\" Neurology cleared to start eliquis.  Started eliquis 5mg bid.  Changing to oral cardizem.  Echo results significant for combined systolic and diastolic heart failure \"Four chamber dilation. Biventricular failure. LV systolic function is severely reduced. LVEF 20% with global hypokinesis. No significant valuvlar disease visualized on this study. RVSP estimated at 40 mmHg. IVC is dilated and fails to collapse with inspiration suggesting elevated central venous pressures. Compared to the images of the study\"  Also with bilateral mild carotid stenosis, <50%       Interval Problem Update  Patient was seen and " evaluated today for new a.fib sent from [facility]      Review of Systems  Review of Systems   Constitutional: Negative for fever and malaise/fatigue.   Respiratory: Negative for cough and shortness of breath.    Cardiovascular: Positive for palpitations. Negative for chest pain.   Skin: Negative for itching and rash.   Psychiatric/Behavioral: Negative for depression. The patient is not nervous/anxious.     Physical Exam  Physical Exam   Constitutional: No distress.   Cardiovascular:   IRIR   Neurological: She is alert.   Skin: Skin is warm and dry. She is not diaphoretic.   Nursing note and vitals reviewed.       I/Os    Intake/Output Summary (Last 24 hours) at 10/20/2019 0830  Last data filed at 10/20/2019 0644  Gross per 24 hour   Intake 1213.87 ml   Output --   Net 1213.87 ml    Vital Signs  Temp:  [35.8 °C (96.4 °F)-36.8 °C (98.3 °F)] 36.8 °C (98.3 °F)  Pulse:  [62-94] 68  Resp:  [16-18] 18  BP: (102-124)/(64-84) 118/71  SpO2:  [92 %-99 %] 93 %     Laboratory  Results from last 7 days   Lab Units 10/19/19  0543 10/18/19  2145   WBC 1501 K/uL 5.4 6.6   HGB 1503 g/dL 10.1* 12.2   HCT 1504 % 33.8* 38.1   PLATELET COUNT 1518 K/uL 208 262    Results from last 7 days   Lab Units 10/19/19  0543 10/18/19  2145   SODIUM 101 mmol/L 134* 134*   POTASSIUM 102 mmol/L 3.9 3.2*   CHLORIDE 103 mmol/L 103 102   CO2 104 mmol/L 23 23   ANION GAP ANION  8.0 9.0    mg/dL 25* 29*   CREATININE 109 mg/dL 1.58* 1.70*   CALCIUM 105 mg/dL 8.4* 9.1   GLUCOSE 112 mg/dL 140* 196*   IF DOMINGO AMER 587857 mL/min/1.73 m 2 41* 38*   IF NON  AMER 109GFRB mL/min/1.73 m 2 34* 31*           Medications    Scheduled medications:  DILTIAZem 30 mg Oral Q6HRS   aspirin 325 mg Oral DAILY   Or      aspirin 324 mg Oral DAILY   Or      aspirin 300 mg Rectal DAILY   atorvastatin 40 mg Oral Q EVENING   senna-docusate 2 Tab Oral BID   insulin regular 1-6 Units Subcutaneous 4X/DAY ACHS        Medications were reviewed today.     "  Assessment/Plan  * AF (atrial fibrillation) (HCC)- (present on admission)  Assessment & Plan  10/20 - converted, now in the 60s. Cardiology managing switching to oral cardizem, started on Eliquis  10/19 - on cardizem drip, consulted cardiology for possible cardioversion, cannot do in the setting of the acute stroke findings.  Transitioning to oral cardizem of 30mg q6h on top of infusion. Continues to be in a.fib.  Troponins stable.  10/18 - adm - With rapid ventricular rate.  Plan for echocardiogram, telemetry monitoring, and ongoing diltiazem infusion at 10 mg/h.  This may represent paroxysmal disease, as it is a new diagnosis to the patient.  Results from last 7 days   Lab Units 10/19/19  0543 10/18/19  2145   TROPONIN T [id number]  ng/L 24* 26*         Cerebral infarction (HCC)- (present on admission)  Assessment & Plan  10/20 - MRI overnight - \"Moderate size acute area of infarction involving the left posterior temporal, left parietal-occipital, and lateral occipital lobes\" Neurology cleared to start eliquis.  Started eliquis 5mg bid.\" SLP eval pending.  10/19 - Stroke protocol/order set placed, patient has had expressive aphasia symptoms for 3 days, not 3 weeks.  Has had dizziness/weird sensations for the last 3 days.  CT head showed Acute LEFT parietal infarct.  MR brain ordered by neurology, CT head here does show stable size from outside facility, Peurology consulted, cannot anti-coagulate for 3 days post stroke, NIH 2.  Patient states she knew things were wrong 3 days ago, but didn't come to the hospital because she was afraid what they'd find.  Really frustrated by the broca' aphasia.    Broca's aphasia- (present on admission)  Assessment & Plan  10/20 - persistent and likely permanent from her stroke.  Will need rehab, speech at least  10/19 - Patient had an untreated stroke 3 days ago, likely originating from previously undiagnosed a.fib, especially in the setting of her A.Fib admission.  10/18 - adm - " Apparently recently had a cerebrovascular accident with resultant Broca's aphasia.  No other clear symptoms.  Monitor.    JORGE (acute kidney injury) (HCC)- (present on admission)  Assessment & Plan  10/20 - stable  10/19 - improving, Continue, possible cause related to a.fib with RVR.  10/18 - adm - Unclear etiology, check urine studies.  Suspect at least in part due to dehydration.  Will hydrate, monitor.  Results from last 7 days   Lab Units 10/19/19  0543 10/18/19  2145   POTASSIUM 102 mmol/L 3.9 3.2*   CREATININE 109 mg/dL 1.58* 1.70*   IF DOMINGO AMER [id number] mL/min/1.73 m 2 41* 38*   IF NON  AMER 109GFRB mL/min/1.73 m 2 34* 31*            Smoker- (present on admission)  Assessment & Plan  10/20 - discussion of smoking cessation ~4 minutes about her smoking as a exacerbating factor to cause her stroke, patient amenable, interested in resources  10/19 - stable for now.    Class 2 obesity due to excess calories without serious comorbidity with body mass index (BMI) of 36.0 to 36.9 in adult- (present on admission)  Assessment & Plan  Body mass index is 36.21 kg/m².  10/19-10/20 - stable  10/18 - adm    Type 2 diabetes mellitus with hyperglycemia, without long-term current use of insulin (HCC)- (present on admission)  Assessment & Plan  10/19-10/20 - stable  10/18 - adm - Not currently on medication regimen.  Patient reports that she checks her blood sugar every other day and has been normal.  Currently she has hyperglycemia, we will monitor on correction dose insulin therapy.  Glycohemoglobin   Date Value Ref Range Status   06/01/2011 6.9 (H) 4.0 - 5.6 % Final     Comment:       Hypokalemia- (present on admission)  Assessment & Plan  10/19-10/20 - resolved  Results from last 7 days   Lab Units 10/19/19  0543 10/18/19  2145   POTASSIUM 102 mmol/L 3.9 3.2*   CREATININE 109 mg/dL 1.58* 1.70*   IF DOMINGO AMER 105190 mL/min/1.73 m 2 41* 38*   IF NON  AMER 109GFRB mL/min/1.73 m 2 34* 31*               VTE  prophylaxis: heparin drip    Imaging  MR-BRAIN-W/O   Final Result         1.  Moderate size acute area of infarction involving the left posterior temporal, left parietal-occipital, and lateral occipital lobes.      2.  Small chronic area of lacunar infarction involving the left thalamus.      3.  Minimal periventricular, juxtacortical, and pontine white matter changes consistent with chronic microvascular ischemic gliosis      MR-MRA HEAD-W/O   Final Result         MRA OF THE [place] WITHIN NORMAL LIMITS.      EC-ECHOCARDIOGRAM COMPLETE W/ CONT   Final Result      US-CAROTID DOPPLER BILAT   Final Result      CT-HEAD W/O   Final Result      1.  Acute LEFT parietal infarct again seen, unchanged.   2.  No associated hemorrhage demonstrated.         OUTSIDE IMAGES-CT HEAD   Final Result      OUTSIDE IMAGES-DX CHEST   Final Result

## 2019-10-20 NOTE — PROGRESS NOTES
Bedside report received, assumed care of patient. Pt is A+O x 4. No acute distress noted. Rhythm is Afib. Patient is not on oxygen. Informed of patient of safety and call bell system. Bed is low/locked, call bell within reach, bed alarm in place. Patient on Cardizem drip at 10mg/hr.

## 2019-10-20 NOTE — PROGRESS NOTES
Patient Diagnosis and Management daily plan per neurology:        1) Acute Left MCA stroke: cardioembolic with new onset a fib.  MRI no HIT.  Ok eliquis today 5 mg BID.  NIH today 1     Complete neurological note to support plan is below.    Chief Complain:F/U for: stroke    SUBJECTIVE:     Patient feels able to talk and communicate better        ROS:  Reviewed and no changes from yesterday note [id number]      PHYSICAL EXAMINATION:    Examination was done and no changes were present from yesterday note 10/19 2019 except for :     Constitutional: lying in bed resting, comfortable     CARDIOVASCULAR:A FIB, irregular,    PULMONARY:cta      EXTREMITIES:no edema    NEUROLOGICAL:    MENTAL: awake, alert, oriented normal speech and language    CRANIAL NERVES:2-12 mild R lower facial weakness    MOTOR:5/5    SENSORY:intact    DTR:+1    BABINSKI:neg    Movements: No abnormal noticed.    CEREBELLAR:  No   dysmetria     GAIT: deferred                      Vitals:    10/20/19 0307 10/20/19 0514 10/20/19 0800 10/20/19 1113   BP: 114/84  118/71 124/74   Pulse: 94 90 68 75   Resp: 18  18 17   Temp: 36.2 °C (97.2 °F)  36.8 °C (98.3 °F) 36.6 °C (97.9 °F)   TempSrc: Temporal  Temporal Temporal   SpO2: 99%  93% 96%   Weight:       Height:                      Imaging: neuroimaging reviewed and directly visualized by me  MR-BRAIN-W/O   Final Result         1.  Moderate size acute area of infarction involving the left posterior temporal, left parietal-occipital, and lateral occipital lobes.      2.  Small chronic area of lacunar infarction involving the left thalamus.      3.  Minimal periventricular, juxtacortical, and pontine white matter changes consistent with chronic microvascular ischemic gliosis      MR-MRA HEAD-W/O   Final Result         MRA OF THE [place] WITHIN NORMAL LIMITS.      EC-ECHOCARDIOGRAM COMPLETE W/ CONT   Final Result      US-CAROTID DOPPLER BILAT   Final Result      CT-HEAD W/O   Final Result      1.   Acute LEFT parietal infarct again seen, unchanged.   2.  No associated hemorrhage demonstrated.         OUTSIDE IMAGES-CT HEAD   Final Result      OUTSIDE IMAGES-DX CHEST   Final Result            Labs:  Recent Labs     10/18/19  2145 10/19/19  0543   WBC 6.6 5.4   RBC 3.98* 3.45*   HEMOGLOBIN 12.2 10.1*   HEMATOCRIT 38.1 33.8*   MCV 95.7 98.0*   MCH 30.7 29.3   MCHC 32.0* 29.9*   RDW 51.1* 53.0*   PLATELETCT 262 208   MPV 11.4 11.2     Recent Labs     10/18/19  2145 10/19/19  0543   SODIUM 134* 134*   POTASSIUM 3.2* 3.9   CHLORIDE 102 103   CO2 23 23   GLUCOSE 196* 140*   BUN 29* 25*   CREATININE 1.70* 1.58*   CALCIUM 9.1 8.4*     Recent Labs     10/19/19  1136   APTT 26.5   INR 1.12             Recent Labs     10/20/19  0259   TRIGLYCERIDE 162*   HDL 23*   LDL 62     Recent Labs     10/18/19  2145 10/19/19  0543   SODIUM 134* 134*   POTASSIUM 3.2* 3.9   CHLORIDE 102 103   CO2 23 23   GLUCOSE 196* 140*   BUN 29* 25*     Recent Labs     10/18/19  2145 10/19/19  0543   SODIUM 134* 134*   POTASSIUM 3.2* 3.9   CHLORIDE 102 103   CO2 23 23   BUN 29* 25*   CREATININE 1.70* 1.58*   CALCIUM 9.1 8.4*     Recent Labs     10/19/19  1136   APTT 26.5   INR 1.12     Results for orders placed or performed during the hospital encounter of 05/31/11   2-D ECHO WITH BUBBLE STUDY (ECHOCARDIOGRAM COMP W/O CONT)   Result Value Ref Range    Eject.Frac. MOD BP 53.06     Eject.Frac. MOD 4C 55.05     Eject.Frac. MOD 2C 53.87                      Fernanda Kaba M.D.    Diplomate Neurology, Vascular Neurology and Neurophysiology

## 2019-10-20 NOTE — PROGRESS NOTES
Patient arrived back from MRI, accompanied by RN and zoll monitor, on cardizem drip. Placed back on tele monitor.

## 2019-10-21 LAB
ANION GAP SERPL CALC-SCNC: 8 MMOL/L (ref 0–11.9)
BUN SERPL-MCNC: 28 MG/DL (ref 8–22)
CALCIUM SERPL-MCNC: 9 MG/DL (ref 8.5–10.5)
CHLORIDE SERPL-SCNC: 101 MMOL/L (ref 96–112)
CO2 SERPL-SCNC: 23 MMOL/L (ref 20–33)
CREAT SERPL-MCNC: 1.65 MG/DL (ref 0.5–1.4)
GLUCOSE BLD-MCNC: 105 MG/DL (ref 65–99)
GLUCOSE BLD-MCNC: 126 MG/DL (ref 65–99)
GLUCOSE BLD-MCNC: 159 MG/DL (ref 65–99)
GLUCOSE BLD-MCNC: 161 MG/DL (ref 65–99)
GLUCOSE SERPL-MCNC: 102 MG/DL (ref 65–99)
MAGNESIUM SERPL-MCNC: 1.7 MG/DL (ref 1.5–2.5)
POTASSIUM SERPL-SCNC: 4.2 MMOL/L (ref 3.6–5.5)
SODIUM SERPL-SCNC: 132 MMOL/L (ref 135–145)

## 2019-10-21 PROCEDURE — 82962 GLUCOSE BLOOD TEST: CPT

## 2019-10-21 PROCEDURE — 700101 HCHG RX REV CODE 250: Performed by: STUDENT IN AN ORGANIZED HEALTH CARE EDUCATION/TRAINING PROGRAM

## 2019-10-21 PROCEDURE — 700102 HCHG RX REV CODE 250 W/ 637 OVERRIDE(OP): Performed by: INTERNAL MEDICINE

## 2019-10-21 PROCEDURE — 700111 HCHG RX REV CODE 636 W/ 250 OVERRIDE (IP): Performed by: STUDENT IN AN ORGANIZED HEALTH CARE EDUCATION/TRAINING PROGRAM

## 2019-10-21 PROCEDURE — 36415 COLL VENOUS BLD VENIPUNCTURE: CPT

## 2019-10-21 PROCEDURE — 99232 SBSQ HOSP IP/OBS MODERATE 35: CPT | Performed by: INTERNAL MEDICINE

## 2019-10-21 PROCEDURE — 700111 HCHG RX REV CODE 636 W/ 250 OVERRIDE (IP): Performed by: INTERNAL MEDICINE

## 2019-10-21 PROCEDURE — A9270 NON-COVERED ITEM OR SERVICE: HCPCS | Performed by: NURSE PRACTITIONER

## 2019-10-21 PROCEDURE — A9270 NON-COVERED ITEM OR SERVICE: HCPCS | Performed by: HOSPITALIST

## 2019-10-21 PROCEDURE — A9270 NON-COVERED ITEM OR SERVICE: HCPCS | Performed by: INTERNAL MEDICINE

## 2019-10-21 PROCEDURE — 700102 HCHG RX REV CODE 250 W/ 637 OVERRIDE(OP): Performed by: STUDENT IN AN ORGANIZED HEALTH CARE EDUCATION/TRAINING PROGRAM

## 2019-10-21 PROCEDURE — 80048 BASIC METABOLIC PNL TOTAL CA: CPT

## 2019-10-21 PROCEDURE — 83735 ASSAY OF MAGNESIUM: CPT

## 2019-10-21 PROCEDURE — A9270 NON-COVERED ITEM OR SERVICE: HCPCS | Performed by: STUDENT IN AN ORGANIZED HEALTH CARE EDUCATION/TRAINING PROGRAM

## 2019-10-21 PROCEDURE — 700102 HCHG RX REV CODE 250 W/ 637 OVERRIDE(OP): Performed by: HOSPITALIST

## 2019-10-21 PROCEDURE — 770020 HCHG ROOM/CARE - TELE (206)

## 2019-10-21 PROCEDURE — 99233 SBSQ HOSP IP/OBS HIGH 50: CPT | Performed by: STUDENT IN AN ORGANIZED HEALTH CARE EDUCATION/TRAINING PROGRAM

## 2019-10-21 PROCEDURE — 700102 HCHG RX REV CODE 250 W/ 637 OVERRIDE(OP): Performed by: NURSE PRACTITIONER

## 2019-10-21 RX ORDER — FUROSEMIDE 20 MG/1
20 TABLET ORAL
Status: DISCONTINUED | OUTPATIENT
Start: 2019-10-22 | End: 2019-10-22

## 2019-10-21 RX ORDER — METOPROLOL TARTRATE 1 MG/ML
5 INJECTION, SOLUTION INTRAVENOUS
Status: DISCONTINUED | OUTPATIENT
Start: 2019-10-21 | End: 2019-10-26 | Stop reason: HOSPADM

## 2019-10-21 RX ORDER — DIGOXIN 0.25 MG/ML
250 INJECTION INTRAMUSCULAR; INTRAVENOUS EVERY 8 HOURS
Status: COMPLETED | OUTPATIENT
Start: 2019-10-21 | End: 2019-10-21

## 2019-10-21 RX ORDER — DIGOXIN 0.25 MG/ML
125 INJECTION INTRAMUSCULAR; INTRAVENOUS EVERY 8 HOURS
Status: COMPLETED | OUTPATIENT
Start: 2019-10-21 | End: 2019-10-22

## 2019-10-21 RX ORDER — DIGOXIN 125 MCG
125 TABLET ORAL DAILY
Status: DISCONTINUED | OUTPATIENT
Start: 2019-10-22 | End: 2019-10-25

## 2019-10-21 RX ORDER — DIGOXIN 0.25 MG/ML
250 INJECTION INTRAMUSCULAR; INTRAVENOUS ONCE
Status: DISCONTINUED | OUTPATIENT
Start: 2019-10-21 | End: 2019-10-21

## 2019-10-21 RX ADMIN — METOPROLOL TARTRATE 25 MG: 25 TABLET, FILM COATED ORAL at 05:42

## 2019-10-21 RX ADMIN — INSULIN HUMAN 1 UNITS: 100 INJECTION, SOLUTION PARENTERAL at 17:18

## 2019-10-21 RX ADMIN — METOPROLOL TARTRATE 37.5 MG: 25 TABLET, FILM COATED ORAL at 22:11

## 2019-10-21 RX ADMIN — APIXABAN 5 MG: 5 TABLET, FILM COATED ORAL at 05:41

## 2019-10-21 RX ADMIN — ASPIRIN 81 MG 81 MG: 81 TABLET ORAL at 05:41

## 2019-10-21 RX ADMIN — SENNOSIDES AND DOCUSATE SODIUM 2 TABLET: 8.6; 5 TABLET ORAL at 17:15

## 2019-10-21 RX ADMIN — INSULIN HUMAN 1 UNITS: 100 INJECTION, SOLUTION PARENTERAL at 10:14

## 2019-10-21 RX ADMIN — DIGOXIN 125 MCG: 0.25 INJECTION INTRAMUSCULAR; INTRAVENOUS at 22:12

## 2019-10-21 RX ADMIN — ATORVASTATIN CALCIUM 40 MG: 40 TABLET, FILM COATED ORAL at 17:15

## 2019-10-21 RX ADMIN — LOSARTAN POTASSIUM 12.5 MG: 25 TABLET ORAL at 05:42

## 2019-10-21 RX ADMIN — METOPROLOL TARTRATE 5 MG: 5 INJECTION, SOLUTION INTRAVENOUS at 14:42

## 2019-10-21 RX ADMIN — METOPROLOL TARTRATE 37.5 MG: 25 TABLET, FILM COATED ORAL at 13:32

## 2019-10-21 RX ADMIN — DIGOXIN 250 MCG: 0.25 INJECTION INTRAMUSCULAR; INTRAVENOUS at 16:59

## 2019-10-21 RX ADMIN — SPIRONOLACTONE 25 MG: 25 TABLET ORAL at 05:41

## 2019-10-21 RX ADMIN — FUROSEMIDE 20 MG: 10 INJECTION, SOLUTION INTRAMUSCULAR; INTRAVENOUS at 05:43

## 2019-10-21 NOTE — DIETARY
Nutrition Services: Day 3 of admit.  Judith Vallecillo is a 53 y.o. female with admitting DX of Atrial fibrillation with RVR.  Consult received for 24-33 lb weight loss x 1 month, poor appetite.     Patient reports losing 13.6 kg (30 lbs) x 1 month. During this month, patient stated her PO intake was about 50% of usual PO intake. Patient verbalized UBW is 104 kg (230 lbs) and that she weighed about 90 kg (198 lbs) a month ago. At this time, patient reports feeling hungry.     Assessment:  Ht: 157.5 cm, Wt: 95.4 kg (210 lb) via bed scale, BMI: 38.47 - obese  Diet: NPO    Evaluation:   1. 9.1 kg (20 lb) weight loss from UBW per patient verbalization, 9% decrease over unknown period of time noted.  2. 5.5 kg (12 lb) weight gain from patient stated weight 1 month ago, 6% increase x 1 month noted.   3. Suspect weight fluctuation secondary to fluid accumulation.   4. No pertinent weight history available per chart review.   5. No edema per MD notes.  6. Labs: BUN 28, creatinine 1.65, glycohemoglobin 8.5 (10/19/19), triglycerides 162  7. Meds: lasix, humulin R, glucose 4 g tablet, dextrose 10% bolus, zofran, bowel protocol, aldactone  8. Last BM: 10/18    Malnutrition Risk: Unable to determine based on patient interview.    Recommendations/Plan:  1. Advance diet when medically appropriate.   2. Monitor weight.    RD following.

## 2019-10-21 NOTE — ASSESSMENT & PLAN NOTE
Cath as outlined above  -remains without chest pain at this time  -continue aldactone low dose losratan  Continue toprol XL  -doing well  -digoxin

## 2019-10-21 NOTE — CARE PLAN
Problem: Knowledge Deficit  Goal: Knowledge of disease process/condition, treatment plan, diagnostic tests, and medications will improve  Outcome: PROGRESSING AS EXPECTED   Pt updated on POC, tests, and medications. Pt verbalizes understanding and has no further questions at this time. Pt educated on calling for any more questions.     Problem: Fluid Volume:  Goal: Will maintain balanced intake and output  Outcome: PROGRESSING AS EXPECTED  RN will monitor patient for fluid overload as well as provide education to patient regarding fluid restriction. RN will also encourage compliance with fluid restriction guidelines.

## 2019-10-21 NOTE — DISCHARGE PLANNING
F/U for Rehab. Would appreciate OT/SLP evals - as clinically appropriate -  to assist with determination for post acute needs. Cardiac cath pending. No Physiatry consult ordered per protocol at this time. TCC monitoring.

## 2019-10-21 NOTE — PROGRESS NOTES
Bedside report received. Patient resting in bed. RN assessed pain; patient declines pain present at this time. Call light within reach. Need for bed alarm assessed; patient is at low risk for falls, precautions in place as appropriate.

## 2019-10-21 NOTE — DISCHARGE PLANNING
CM attempted to meet with pt at bedside to complete assessment, however, bedside RN and family are currently at bedside and pt unable to complete assessment at this time.   This CM will attempt to try back later.

## 2019-10-21 NOTE — CARE PLAN
Problem: Nutritional:  Goal: Achieve adequate nutritional intake  Description  Diet > NPO  Outcome: NOT MET

## 2019-10-21 NOTE — PROGRESS NOTES
CC:   Chief Complaint   Patient presents with   • Atrial Fibrillation     pt was in afib RVR at outside facility in the 170's   • Sent by MD     transfer from [facility]       HPI:    Patient is a 54 yo woman with hx of prior stroke and active smoker who was in her usual state of health until about 3 days ago when suddenly she had word finding difficulties during speech. Presented to the ED and was found to be in AF with RVR, new HFrEF 20% and acute CVA. She lives and works in [place] about 2 hours from here.    Currently, improved. States expressive aphasia largely resolved. Denies active cardiac complaints.     Medications / Drug list prior to admission:  No current facility-administered medications on file prior to encounter.      Current Outpatient Medications on File Prior to Encounter   Medication Sig Dispense Refill   • naproxen (ALEVE) 220 MG tablet Take 220 mg by mouth 2 times a day as needed (pain).         Current list of administered Medications:    Current Facility-Administered Medications:   •  apixaban (ELIQUIS) tablet 5 mg, 5 mg, Oral, BID, Meño Pena, D.O., 5 mg at 10/21/19 0541  •  aspirin (ASA) chewable tab 81 mg, 81 mg, Oral, DAILY, Meño Pena, D.O., 81 mg at 10/21/19 0541  •  metoprolol (LOPRESSOR) tablet 25 mg, 25 mg, Oral, Q8HRS, Sayeh Fesharaki, A.P.N., 25 mg at 10/21/19 0542  •  spironolactone (ALDACTONE) tablet 25 mg, 25 mg, Oral, Q DAY, Sayeh Fesharaki, A.P.N., 25 mg at 10/21/19 0541  •  losartan (COZAAR) tablet 12.5 mg, 12.5 mg, Oral, Q DAY, Sayeh Fesharaki, A.P.N., 12.5 mg at 10/21/19 0542  •  furosemide (LASIX) injection 20 mg, 20 mg, Intravenous, BID DIURETIC, Polo Calhoun M.D., 20 mg at 10/21/19 0543  •  atorvastatin (LIPITOR) tablet 40 mg, 40 mg, Oral, Q EVENING, Meño Pena, D.O., 40 mg at 10/20/19 1723  •  acetaminophen (TYLENOL) tablet 650 mg, 650 mg, Oral, Q6HRS PRN, Polo Trammell M.D.  •  cloNIDine (CATAPRES) tablet 0.1 mg, 0.1 mg, Oral, Q6HRS PRN,  Polo Trammell M.D.  •  ondansetron (ZOFRAN) syringe/vial injection 4 mg, 4 mg, Intravenous, Q4HRS PRN, Polo Trammell M.D.  •  ondansetron (ZOFRAN ODT) dispertab 4 mg, 4 mg, Oral, Q4HRS PRN, Polo Trammell M.D.  •  promethazine (PHENERGAN) tablet 12.5-25 mg, 12.5-25 mg, Oral, Q4HRS PRN, Polo Trammell M.D.  •  promethazine (PHENERGAN) suppository 12.5-25 mg, 12.5-25 mg, Rectal, Q4HRS PRN, Polo Trammell M.D.  •  prochlorperazine (COMPAZINE) injection 5-10 mg, 5-10 mg, Intravenous, Q4HRS PRN, Polo Trammell M.D.  •  senna-docusate (PERICOLACE or SENOKOT S) 8.6-50 MG per tablet 2 Tab, 2 Tab, Oral, BID **AND** polyethylene glycol/lytes (MIRALAX) PACKET 1 Packet, 1 Packet, Oral, QDAY PRN **AND** magnesium hydroxide (MILK OF MAGNESIA) suspension 30 mL, 30 mL, Oral, QDAY PRN **AND** bisacodyl (DULCOLAX) suppository 10 mg, 10 mg, Rectal, QDAY PRN, Polo Trammell M.D.  •  insulin regular (HUMULIN R) injection 1-6 Units, 1-6 Units, Subcutaneous, 4X/DAY ACHS, 2 Units at 10/20/19 2113 **AND** Accu-Chek ACHS, , , Q AC AND BEDTIME(S) **AND** NOTIFY MD and PharmD, , , Once **AND** glucose 4 g chewable tablet 16 g, 16 g, Oral, Q15 MIN PRN **AND** DEXTROSE 10% BOLUS 250 mL, 250 mL, Intravenous, Q15 MIN PRN, Polo Trammell M.D.    Past Medical History:   Diagnosis Date   • Pneumonia 1975    as a child       Past Surgical History:   Procedure Laterality Date   • RECOVERY  6/2/2011    Performed by SURGERY, SANDRA at [facility]       Family History   Problem Relation Age of Onset   • Heart Disease Mother    • Heart Disease Father      Patient family history was personally reviewed, no pertinent family history to current presentation    Social History     Socioeconomic History   • Marital status: Single     Spouse name: Not on file   • Number of children: Not on file   • Years of education: Not on file   • Highest education level: Not on file   Occupational History   • Not on file   Social Needs   • Financial  resource strain: Not on file   • Food insecurity:     Worry: Not on file     Inability: Not on file   • Transportation needs:     Medical: Not on file     Non-medical: Not on file   Tobacco Use   • Smoking status: Current Every Day Smoker     Packs/day: 0.50     Years: 20.00     Pack years: 10.00     Types: Cigarettes   • Smokeless tobacco: Never Used   Substance and Sexual Activity   • Alcohol use: Not Currently     Frequency: Never     Comment: very rare drink of a beer or wine   • Drug use: No   • Sexual activity: Not on file   Lifestyle   • Physical activity:     Days per week: Not on file     Minutes per session: Not on file   • Stress: Not on file   Relationships   • Social connections:     Talks on phone: Not on file     Gets together: Not on file     Attends [employer] service: Not on file     Active member of club or organization: Not on file     Attends meetings of clubs or organizations: Not on file     Relationship status: Not on file   • Intimate partner violence:     Fear of current or ex partner: Not on file     Emotionally abused: Not on file     Physically abused: Not on file     Forced sexual activity: Not on file   Other Topics Concern   • Not on file   Social History Narrative   • Not on file       ALLERGIES:  Allergies   Allergen Reactions   • Bee Anaphylaxis     Swelling and airway closure.   • Latex Itching and Swelling     Swelling, redness, itching.   • Strawberry Itching     itching       Review of systems:  A complete review of symptoms was reviewed with patient. This is reviewed in H&P and PMH. ALL OTHERS reviewed and negative    Physical exam:  Patient Vitals for the past 24 hrs:   BP Temp Temp src Pulse Resp SpO2 Weight   10/21/19 0727 (!) 86/60 35.9 °C (96.7 °F) Temporal 100 16 96 % --   10/21/19 0542 119/90 -- -- 60 -- -- --   10/21/19 0352 (!) 95/61 36 °C (96.8 °F) Temporal 88 18 97 % --   10/21/19 0000 (!) 95/69 35.9 °C (96.7 °F) Temporal 74 18 94 % --   10/20/19 2113 102/64 -- --  (!) 53 -- -- 95.4 kg (210 lb 5.1 oz)   10/20/19 2045 -- -- -- 99 -- -- --   10/20/19 2000 102/60 36 °C (96.8 °F) Temporal (!) 47 18 94 % --   10/20/19 1551 115/75 37.2 °C (98.9 °F) Temporal 78 18 96 % --   10/20/19 1113 124/74 36.6 °C (97.9 °F) Temporal 75 17 96 % --     Tele: AF 120s    General: No acute distress. Obese  EYES: no jaundice  HEENT: OP clear   Neck: No bruits No JVD.   CVS:  irreg. S1 + S2. No M/R/G  Resp: CTAB. No wheezing or crackles/rhonchi.  Abdomen: Soft, NT, ND,  Skin: Grossly nothing acute no obvious rashes  Neurological: Alert, Moves all extremities, no cranial nerve defects on limited exam  Extremities: Pulse 2+ in b/l LE. No edema. No cyanosis.       Data:  Laboratory studies personally reviewed by me:  Recent Results (from the past 24 hour(s))   ACCU-CHEK GLUCOSE    Collection Time: 10/20/19 12:43 PM   Result Value Ref Range    Glucose - Accu-Ck 183 (H) 65 - 99 mg/dL   ACCU-CHEK GLUCOSE    Collection Time: 10/20/19  6:26 PM   Result Value Ref Range    Glucose - Accu-Ck 213 (H) 65 - 99 mg/dL   ACCU-CHEK GLUCOSE    Collection Time: 10/20/19  9:15 PM   Result Value Ref Range    Glucose - Accu-Ck 201 (H) 65 - 99 mg/dL   Basic Metabolic Panel    Collection Time: 10/21/19  2:30 AM   Result Value Ref Range    Sodium 132 (L) 135 - 145 mmol/L    Potassium 4.2 3.6 - 5.5 mmol/L    Chloride 101 96 - 112 mmol/L    Co2 23 20 - 33 mmol/L    Glucose 102 (H) 65 - 99 mg/dL    Bun 28 (H) 8 - 22 mg/dL    Creatinine 1.65 (H) 0.50 - 1.40 mg/dL    Calcium 9.0 8.5 - 10.5 mg/dL    Anion Gap 8.0 0.0 - 11.9   MAGNESIUM    Collection Time: 10/21/19  2:30 AM   Result Value Ref Range    Magnesium 1.7 1.5 - 2.5 mg/dL   ESTIMATED GFR    Collection Time: 10/21/19  2:30 AM   Result Value Ref Range    GFR If  39 (A) >60 mL/min/1.73 m 2    GFR If Non  32 (A) >60 mL/min/1.73 m 2       Imaging:  MR-BRAIN-W/O   Final Result         1.  Moderate size acute area of infarction involving the left  posterior temporal, left parietal-occipital, and lateral occipital lobes.      2.  Small chronic area of lacunar infarction involving the left thalamus.      3.  Minimal periventricular, juxtacortical, and pontine white matter changes consistent with chronic microvascular ischemic gliosis      MR-MRA HEAD-W/O   Final Result         MRA OF THE [place] WITHIN NORMAL LIMITS.      EC-ECHOCARDIOGRAM COMPLETE W/ CONT   Final Result      US-CAROTID DOPPLER BILAT   Final Result      CT-HEAD W/O   Final Result      1.  Acute LEFT parietal infarct again seen, unchanged.   2.  No associated hemorrhage demonstrated.         OUTSIDE IMAGES-CT HEAD   Final Result      OUTSIDE IMAGES-DX CHEST   Final Result      CL-RIGHT AND LEFT HEART CATHETERIZATION W/NITROUS OXIDE    (Results Pending)     TELE: AF 120s    TTE 10/19/19  CONCLUSIONS  Four chamber dilation. Biventricular failure. LV systolic function is   severely reduced. LVEF 20% with global hypokinesis. No significant   valuvlar disease visualized on this study. RVSP estimated at 40 mmHg.   IVC is dilated and fails to collapse with inspiration suggesting   elevated central venous pressures. Compared to the images of the study   done on 06/01/2011 there has been a severe decline in biventricular   function.    MRI brain 10/19/19  1.  Moderate size acute area of infarction involving the left posterior temporal, left parietal-occipital, and lateral occipital lobes.  2.  Small chronic area of lacunar infarction involving the left thalamus.  3.  Minimal periventricular, juxtacortical, and pontine white matter changes consistent with chronic microvascular ischemic gliosis    All pertinent features of laboratory and imaging reviewed including primary images where applicable      Principal Problem:    AF (atrial fibrillation) (HCC) POA: Yes  Active Problems:    Cerebral infarction (HCC) POA: Yes      Overview: Diagnois update 10/1/2016    Acute combined systolic and diastolic  heart failure (HCC) POA: Yes    JORGE (acute kidney injury) (HCC) POA: Yes    Broca's aphasia POA: Yes    Smoker POA: Yes    Bilateral carotid artery stenosis POA: Yes    Hypokalemia POA: Yes    Type 2 diabetes mellitus with hyperglycemia, without long-term current use of insulin (HCC) POA: Yes    Class 2 obesity due to excess calories without serious comorbidity with body mass index (BMI) of 36.0 to 36.9 in adult POA: Yes  Resolved Problems:    * No resolved hospital problems. *      Assessment / Plan:  # acute CVA - improving  # AF w RVR, dilated LA  # HFrEF 20% - stable  # JORGE - plateau renal markers     -rate control  -secondary cva prevention with eliquis 5mg bid  -R/LHC tomorrow ** please hold am eliquis  -If she remains in persistent AF after a few weeks, we can plan for elective cardioversion after 4 weeks OAC at which time will weigh antiarrhythmics  -titrate HF-OMT as tolerated: cont ARB, BB, spironolactone  -convert lasix IV 20mg bid to 20mg PO daily  -Increase metoprolol to 37.5mg tid    Discussed with medical staff.    It is my pleasure to participate in the care of Ms. Vallecillo.  Please do not hesitate to contact me with questions or concerns.    Thien Bolden MD  Cardiologist [facility]    10/21/2019

## 2019-10-22 ENCOUNTER — APPOINTMENT (OUTPATIENT)
Dept: RADIOLOGY | Facility: MEDICAL CENTER | Age: 54
DRG: 981 | End: 2019-10-22
Attending: INTERNAL MEDICINE
Payer: COMMERCIAL

## 2019-10-22 LAB
ALBUMIN SERPL BCP-MCNC: 3.8 G/DL (ref 3.2–4.9)
ALBUMIN/GLOB SERPL: 1.2 G/DL
ALP SERPL-CCNC: 99 U/L (ref 30–99)
ALT SERPL-CCNC: 12 U/L (ref 2–50)
ANION GAP SERPL CALC-SCNC: 12 MMOL/L (ref 0–11.9)
ANION GAP SERPL CALC-SCNC: 9 MMOL/L (ref 0–11.9)
APTT PPP: 33 SEC (ref 24.7–36)
AST SERPL-CCNC: 17 U/L (ref 12–45)
BILIRUB SERPL-MCNC: 0.9 MG/DL (ref 0.1–1.5)
BUN SERPL-MCNC: 32 MG/DL (ref 8–22)
BUN SERPL-MCNC: 34 MG/DL (ref 8–22)
CALCIUM SERPL-MCNC: 9 MG/DL (ref 8.5–10.5)
CALCIUM SERPL-MCNC: 9.4 MG/DL (ref 8.5–10.5)
CHLORIDE SERPL-SCNC: 101 MMOL/L (ref 96–112)
CHLORIDE SERPL-SCNC: 103 MMOL/L (ref 96–112)
CO2 SERPL-SCNC: 24 MMOL/L (ref 20–33)
CO2 SERPL-SCNC: 25 MMOL/L (ref 20–33)
CREAT SERPL-MCNC: 1.84 MG/DL (ref 0.5–1.4)
CREAT SERPL-MCNC: 1.94 MG/DL (ref 0.5–1.4)
CREAT UR-MCNC: 34 MG/DL
ERYTHROCYTE [DISTWIDTH] IN BLOOD BY AUTOMATED COUNT: 51.5 FL (ref 35.9–50)
GLOBULIN SER CALC-MCNC: 3.2 G/DL (ref 1.9–3.5)
GLUCOSE BLD-MCNC: 118 MG/DL (ref 65–99)
GLUCOSE BLD-MCNC: 126 MG/DL (ref 65–99)
GLUCOSE BLD-MCNC: 140 MG/DL (ref 65–99)
GLUCOSE BLD-MCNC: 188 MG/DL (ref 65–99)
GLUCOSE SERPL-MCNC: 144 MG/DL (ref 65–99)
GLUCOSE SERPL-MCNC: 144 MG/DL (ref 65–99)
HCT VFR BLD AUTO: 40.2 % (ref 37–47)
HGB BLD-MCNC: 12.7 G/DL (ref 12–16)
INR PPP: 1.23 (ref 0.87–1.13)
MAGNESIUM SERPL-MCNC: 1.8 MG/DL (ref 1.5–2.5)
MCH RBC QN AUTO: 30.2 PG (ref 27–33)
MCHC RBC AUTO-ENTMCNC: 31.6 G/DL (ref 33.6–35)
MCV RBC AUTO: 95.7 FL (ref 81.4–97.8)
PLATELET # BLD AUTO: 270 K/UL (ref 164–446)
PMV BLD AUTO: 11 FL (ref 9–12.9)
POTASSIUM SERPL-SCNC: 4.2 MMOL/L (ref 3.6–5.5)
POTASSIUM SERPL-SCNC: 4.4 MMOL/L (ref 3.6–5.5)
PROT SERPL-MCNC: 7 G/DL (ref 6–8.2)
PROTHROMBIN TIME: 15.9 SEC (ref 12–14.6)
RBC # BLD AUTO: 4.2 M/UL (ref 4.2–5.4)
SODIUM SERPL-SCNC: 136 MMOL/L (ref 135–145)
SODIUM SERPL-SCNC: 138 MMOL/L (ref 135–145)
SODIUM UR-SCNC: 69 MMOL/L
WBC # BLD AUTO: 6.6 K/UL (ref 4.8–10.8)

## 2019-10-22 PROCEDURE — 85730 THROMBOPLASTIN TIME PARTIAL: CPT

## 2019-10-22 PROCEDURE — A9270 NON-COVERED ITEM OR SERVICE: HCPCS | Performed by: INTERNAL MEDICINE

## 2019-10-22 PROCEDURE — 700102 HCHG RX REV CODE 250 W/ 637 OVERRIDE(OP): Performed by: INTERNAL MEDICINE

## 2019-10-22 PROCEDURE — 85027 COMPLETE CBC AUTOMATED: CPT

## 2019-10-22 PROCEDURE — A9270 NON-COVERED ITEM OR SERVICE: HCPCS | Performed by: NURSE PRACTITIONER

## 2019-10-22 PROCEDURE — 99233 SBSQ HOSP IP/OBS HIGH 50: CPT | Performed by: INTERNAL MEDICINE

## 2019-10-22 PROCEDURE — 85610 PROTHROMBIN TIME: CPT

## 2019-10-22 PROCEDURE — 80053 COMPREHEN METABOLIC PANEL: CPT

## 2019-10-22 PROCEDURE — 92523 SPEECH SOUND LANG COMPREHEN: CPT

## 2019-10-22 PROCEDURE — 770020 HCHG ROOM/CARE - TELE (206)

## 2019-10-22 PROCEDURE — 83735 ASSAY OF MAGNESIUM: CPT

## 2019-10-22 PROCEDURE — 700102 HCHG RX REV CODE 250 W/ 637 OVERRIDE(OP): Performed by: STUDENT IN AN ORGANIZED HEALTH CARE EDUCATION/TRAINING PROGRAM

## 2019-10-22 PROCEDURE — 700102 HCHG RX REV CODE 250 W/ 637 OVERRIDE(OP): Performed by: NURSE PRACTITIONER

## 2019-10-22 PROCEDURE — A9270 NON-COVERED ITEM OR SERVICE: HCPCS | Performed by: STUDENT IN AN ORGANIZED HEALTH CARE EDUCATION/TRAINING PROGRAM

## 2019-10-22 PROCEDURE — 36415 COLL VENOUS BLD VENIPUNCTURE: CPT

## 2019-10-22 PROCEDURE — 80048 BASIC METABOLIC PNL TOTAL CA: CPT

## 2019-10-22 PROCEDURE — 82962 GLUCOSE BLOOD TEST: CPT

## 2019-10-22 PROCEDURE — 700111 HCHG RX REV CODE 636 W/ 250 OVERRIDE (IP): Performed by: INTERNAL MEDICINE

## 2019-10-22 PROCEDURE — 700111 HCHG RX REV CODE 636 W/ 250 OVERRIDE (IP): Performed by: STUDENT IN AN ORGANIZED HEALTH CARE EDUCATION/TRAINING PROGRAM

## 2019-10-22 PROCEDURE — 76775 US EXAM ABDO BACK WALL LIM: CPT

## 2019-10-22 PROCEDURE — 82570 ASSAY OF URINE CREATININE: CPT

## 2019-10-22 PROCEDURE — 84300 ASSAY OF URINE SODIUM: CPT

## 2019-10-22 RX ORDER — SODIUM CHLORIDE 9 MG/ML
INJECTION, SOLUTION INTRAVENOUS CONTINUOUS
Status: DISCONTINUED | OUTPATIENT
Start: 2019-10-23 | End: 2019-10-23

## 2019-10-22 RX ORDER — METOPROLOL TARTRATE 50 MG/1
50 TABLET, FILM COATED ORAL EVERY 8 HOURS
Status: DISCONTINUED | OUTPATIENT
Start: 2019-10-22 | End: 2019-10-25

## 2019-10-22 RX ORDER — HEPARIN SODIUM 5000 [USP'U]/ML
5000 INJECTION, SOLUTION INTRAVENOUS; SUBCUTANEOUS EVERY 8 HOURS
Status: COMPLETED | OUTPATIENT
Start: 2019-10-22 | End: 2019-10-22

## 2019-10-22 RX ADMIN — ASPIRIN 81 MG 81 MG: 81 TABLET ORAL at 06:11

## 2019-10-22 RX ADMIN — LOSARTAN POTASSIUM 12.5 MG: 25 TABLET ORAL at 06:11

## 2019-10-22 RX ADMIN — ATORVASTATIN CALCIUM 40 MG: 40 TABLET, FILM COATED ORAL at 18:00

## 2019-10-22 RX ADMIN — METOPROLOL TARTRATE 50 MG: 50 TABLET, FILM COATED ORAL at 21:11

## 2019-10-22 RX ADMIN — DIGOXIN 125 MCG: 0.25 INJECTION INTRAMUSCULAR; INTRAVENOUS at 06:12

## 2019-10-22 RX ADMIN — METOPROLOL TARTRATE 37.5 MG: 25 TABLET, FILM COATED ORAL at 06:11

## 2019-10-22 RX ADMIN — INSULIN HUMAN 1 UNITS: 100 INJECTION, SOLUTION PARENTERAL at 21:13

## 2019-10-22 RX ADMIN — SPIRONOLACTONE 25 MG: 25 TABLET ORAL at 06:10

## 2019-10-22 RX ADMIN — HEPARIN SODIUM 5000 UNITS: 5000 INJECTION, SOLUTION INTRAVENOUS; SUBCUTANEOUS at 18:01

## 2019-10-22 RX ADMIN — FUROSEMIDE 20 MG: 20 TABLET ORAL at 06:12

## 2019-10-22 RX ADMIN — DIGOXIN 125 MCG: 125 TABLET ORAL at 19:19

## 2019-10-22 RX ADMIN — METOPROLOL TARTRATE 50 MG: 50 TABLET, FILM COATED ORAL at 13:14

## 2019-10-22 ASSESSMENT — ENCOUNTER SYMPTOMS
AGITATION: 0
ABDOMINAL PAIN: 0
CHEST TIGHTNESS: 0
DEPRESSION: 0
CONFUSION: 0
NERVOUS/ANXIOUS: 0
FEVER: 0
DIAPHORESIS: 0
COUGH: 0
TROUBLE SWALLOWING: 0
PALPITATIONS: 1
CHILLS: 0
BLOOD IN STOOL: 0
COLOR CHANGE: 0
DIZZINESS: 0
HEADACHES: 0
NUMBNESS: 0
TINGLING: 0
ABDOMINAL DISTENTION: 0
NERVOUS/ANXIOUS: 1
SHORTNESS OF BREATH: 0
PALPITATIONS: 0

## 2019-10-22 NOTE — HEART FAILURE PROGRAM
Cardiovascular Nurse Navigator () Advanced Heart Failure Program Inpatient Consult Note:     Patient presented with word finding difficulties. [place] to have CVA. Also, in AF c RVR and newly discovered EF of 20%. Patient lives in [place], [place] and has no prior HF diagnosis.    Patient was actively smoking and her only outpatient medication was Aleve. Patient needs to be educated that she should no longer use NSAIDS as they can cause sodium and water retention.    RHC today. Dr. Bolden following. IV furosemide discontinued yesterday.     · HFrEF (20%)  · NYHA: III  · Etiology: not being noted  · Consider for CardioMEMS commercial or GUIDE HF? No, new diagnosis    Demographics:    · Insurance: [email or web address] Secondary Prevention interventions:    ·     HFrEF GDMT: none of the below are currently prescribed, this needs to be addressed prior to discharge.    · LAYO - I:   · Evidence Based BB (bisoprolol, carvedilol, or Toprol XL):   · Aldosterone receptor antagonist:    · AC for AF:       Device Therapy Screening Tool     Not appropriate in de berhane diagnosis      Daily Weights: ordered    Please teach patient to weigh daily and write on symptom tracker.    Please assess patient's understanding of what to do if weight is out of range.    If pt does not own a working scale and cannot afford to purchase one, please provide one. Scales can be found in the Care Coordination Rooms on [address].    I's and O's: ordered    If we are not tracking intake and output, we don't know if diuretics are working.    This also increases the risk that the patient will be sent home without enough fluid off.    [facility] Plan Notes: possible rehab dispo    Advanced Care Planning:  No AD on file. Full code status at the time of this note's filing.    The ACC recommends engaging palliative care as part of optimization of HFrEF treatment to solicit goals of care and focus on quality of life throughout the clinical course of HF.    Follow up  "appointment:   If discharged from acute care to home (exception hospice discharge), pt must have an appointment scheduled within 7 days of discharge (Cardiology, PCP, or DC Clinic).    If discharged to Transitional Care Facility (LTAC, SNF, IRH), appointment should be made about a month out for after TCF.     Bedside Nursing Education:  Please provide HF booklet and repeated, ongoing education while administering medications, weighing patient, discussing management of symptoms, diet and need to follow up and act on changes.    Bedside Nursing Discharge:  When completing the after visit summary (discharge instructions) please select \"Cardiac Diagnosis, and Heart Failure\" in the special instructions section to populate the heart failure specific discharge instructions.     Referrals/Orders Placed:    Hospital Schedulers for HF f/u?  yes  Social Work   No, following  Registered Dietician  yes  [facility] CP Program for patients with Medicaid, [facility], or skilled nursing Plus coverage?  no    Thais ESTRADA RN, CHFN ext. [phone] M-F        "

## 2019-10-22 NOTE — PROGRESS NOTES
"Hospital Medicine Daily Progress Note    Date of Service: 10/21/2019 Code Status: Full Code     Chief Complaint  Chief Complaint   Patient presents with   • Atrial Fibrillation     pt was in afib RVR at outside facility in the 170's   • Sent by MD     [facility]       Consultants/Specialty: Cardiology, Neurology Disposition: Remain IP     Hospital Course     ER and Admission Day course  53 y.o. female with prior history of diabetes mellitus not currently on medication regimen, and recent cerebrovascular accident with now Broca's aphasia, was in her usual state of health until the day prior to admission.  She reports the onset of chest tightness, this was without exacerbating or alleviating factors and, and did not radiate anywhere.  She denies palpitations.  She has no other symptoms including shortness of breath, abdominal pain, nausea vomiting diarrhea or constipation.  No recent fever or chills  10/19 - Patient had an untreated CVA 3 days ago, likely from undiagnosed a.fib. continues to be in a.fib, on cardizem drip.  Consulted cardiology.  Stroke pathway/order set placed, neurology consulted CT head shows stable L MCA, MRIs pending.  10/20 - MRI overnight - \"Moderate size acute area of infarction involving the left posterior temporal, left parietal-occipital, and lateral occipital lobes\" Neurology cleared to start eliquis.  Started eliquis 5mg bid.  Changing to oral cardizem.  Echo results significant for combined systolic and diastolic heart failure \"Four chamber dilation. Biventricular failure. LV systolic function is severely reduced. LVEF 20% with global hypokinesis. No significant valuvlar disease visualized on this study. RVSP estimated at 40 mmHg. IVC is dilated and fails to collapse with inspiration suggesting elevated central venous pressures. Compared to the images of the study\"  Also with bilateral mild carotid stenosis, <50%  10/21 - patient given eliquis, did not go for cath, " developed tachycardia unresponse to bb therapy, started digoxin.  Cath in AM.       Interval Problem Update  Patient was seen and evaluated today for new a.fib sent from [facility]      Review of Systems  Review of Systems   Constitutional: Negative for fever and malaise/fatigue.   Respiratory: Negative for cough and shortness of breath.    Cardiovascular: Positive for palpitations. Negative for chest pain.   Skin: Negative for itching and rash.   Psychiatric/Behavioral: Negative for depression. The patient is not nervous/anxious.     Physical Exam  Physical Exam   Constitutional: No distress.   Cardiovascular:   IRIR   Neurological: She is alert.   Skin: Skin is warm and dry. She is not diaphoretic.   Nursing note and vitals reviewed.       I/Os  No intake or output data in the 24 hours ending 10/22/19 0534 Vital Signs  Temp:  [35.9 °C (96.6 °F)-36.7 °C (98.1 °F)] 36.3 °C (97.3 °F)  Pulse:  [] 100  Resp:  [15-18] 17  BP: ()/(60-91) 120/68  SpO2:  [94 %-97 %] 94 %     Laboratory  Results from last 7 days   Lab Units 10/19/19  0543 10/18/19  2145   WBC 1501 K/uL 5.4 6.6   HGB 1503 g/dL 10.1* 12.2   HCT 1504 % 33.8* 38.1   PLATELET COUNT 1518 K/uL 208 262    Results from last 7 days   Lab Units 10/22/19  0316 10/21/19  0230   SODIUM 101 mmol/L 136 132*   POTASSIUM 102 mmol/L 4.4 4.2   CHLORIDE 103 mmol/L 103 101   CO2 104 mmol/L 24 23   ANION GAP ANION  9.0 8.0    mg/dL 34* 28*   CREATININE 109 mg/dL 1.94* 1.65*   CALCIUM 105 mg/dL 9.0 9.0   GLUCOSE 112 mg/dL 144* 102*   IF DOMINGO AMER [id number] mL/min/1.73 m 2 32* 39*   IF NON  AMER 109GFRB mL/min/1.73 m 2 27* 32*   MAGNESIUM 561 mg/dL 1.8 1.7           Medications    Scheduled medications:  furosemide 20 mg Oral Q DAY   metoprolol 37.5 mg Oral Q8HRS   digoxin 125 mcg Intravenous Q8HR   Followed by      digoxin 125 mcg Oral DAILY AT 1800   apixaban 5 mg Oral BID   aspirin 81 mg Oral DAILY   spironolactone 25 mg Oral Q DAY   losartan 12.5 mg  "Oral Q DAY   atorvastatin 40 mg Oral Q EVENING   senna-docusate 2 Tab Oral BID   insulin regular 1-6 Units Subcutaneous 4X/DAY [facility]S        Medications were reviewed today.      Assessment/Plan  * AF (atrial fibrillation) (HCC)- (present on admission)  Assessment & Plan  10/21 - Worsened, developed tachy cardia, contacted cardiology who recommended starting digoxin.  Dig 250 loading dose, 125mg q8h after, then dig level after the 4th dose with transition to oral on dose 4.  10/20 - converted, now in the 60s. Cardiology managing switching to oral cardizem, started on Eliquis. Rate converted on cardizem.  10/19 - on cardizem drip, consulted cardiology for possible cardioversion, cannot do in the setting of the acute stroke findings.  Transitioning to oral cardizem of 30mg q6h on top of infusion. Continues to be in a.fib.  Troponins stable.  10/18 - adm - With rapid ventricular rate.  Plan for echocardiogram, telemetry monitoring, and ongoing diltiazem infusion at 10 mg/h.  This may represent paroxysmal disease, as it is a new diagnosis to the patient.  Results from last 7 days   Lab Units 10/19/19  0543 10/18/19  2145   TROPONIN T [id number]  ng/L 24* 26*         Acute combined systolic and diastolic heart failure (HCC)- (present on admission)  Assessment & Plan  10/21 - was supposed to have cath today, but eliquis was given, will have cath in AM tomorrow.  Chest pain free  10/20 - Echo shows EF 20% with global hypokinesis. \"Four chamber dilation. Biventricular failure. LV systolic function is severely reduced. LVEF 20% with global hypokinesis. No significant valuvlar disease visualized on this study. RVSP estimated at 40 mmHg. IVC is dilated and fails to collapse with inspiration suggesting elevated central venous pressures. Compared to the images of the study\" - Cardiology managing, patient will be treated and managed in the setting of systolic heart failure.    Cerebral infarction (HCC)- (present on " "admission)  Assessment & Plan  10/21 - Stable, Neuro following, patient has significant cardio/vascular pathology as well likely etiology of this  10/20 - MRI overnight - \"Moderate size acute area of infarction involving the left posterior temporal, left parietal-occipital, and lateral occipital lobes\" Neurology cleared to start eliquis.  Started eliquis 5mg bid.\" SLP eval pending.  10/19 - Stroke protocol/order set placed, patient has had expressive aphasia symptoms for 3 days, not 3 weeks.  Has had dizziness/weird sensations for the last 3 days.  CT head showed Acute LEFT parietal infarct.  MR brain ordered by neurology, CT head here does show stable size from outside facility, Peurology consulted, cannot anti-coagulate for 3 days post stroke, NIH 2.  Patient states she knew things were wrong 3 days ago, but didn't come to the hospital because she was afraid what they'd find.  Really frustrated by the broca' aphasia.    Broca's aphasia- (present on admission)  Assessment & Plan  10/21 - continues to slowly improve from yesterday's miraculous improvement.  10/20 - Much of her speech symptoms have resolved.  Had a very complex verbally/recall/memory/high function conversation, probably 90% resolved symptoms.  10/19 - Patient had an untreated stroke 3 days ago, likely originating from previously undiagnosed a.fib, especially in the setting of her A.Fib admission.  10/18 - adm - Apparently recently had a cerebrovascular accident with resultant Broca's aphasia.  No other clear symptoms.  Monitor.    JORGE (acute kidney injury) (HCC)- (present on admission)  Assessment & Plan  10/21 - worsened barely.  Would consider nephrology consult if this continues.  10/20 - stable  10/19 - improving, Continue, possible cause related to a.fib with RVR.  10/18 - adm - Unclear etiology, check urine studies.  Suspect at least in part due to dehydration.  Will hydrate, monitor.  Results from last 7 days   Lab Units 10/22/19  9106 " 10/21/19  0230 10/19/19  0543 10/18/19  2145   POTASSIUM 102 mmol/L 4.4 4.2 3.9 3.2*   CREATININE 109 mg/dL 1.94* 1.65* 1.58* 1.70*   IF DOMINGO AMER [id number] mL/min/1.73 m 2 32* 39* 41* 38*   IF NON  AMER 109GFRB mL/min/1.73 m 2 27* 32* 34* 31*   MAGNESIUM 561 mg/dL 1.8 1.7  --   --             Bilateral carotid artery stenosis- (present on admission)  Assessment & Plan  10/21 - stable  10/20 - US carotid with <50% stenosis bilaterally    Smoker- (present on admission)  Assessment & Plan  10/21 - Stable.  10/20 - discussion of smoking cessation ~4 minutes about her smoking as a exacerbating factor to cause her stroke, patient amenable, interested in resources  10/19 - stable for now.    Class 2 obesity due to excess calories without serious comorbidity with body mass index (BMI) of 36.0 to 36.9 in adult- (present on admission)  Assessment & Plan  Body mass index is 36.21 kg/m².  10/19-10/21 - stable  10/18 - adm    Type 2 diabetes mellitus with hyperglycemia, without long-term current use of insulin (HCC)- (present on admission)  Assessment & Plan  10/19-10/21 - stable on current plan  10/18 - adm - Not currently on medication regimen.  Patient reports that she checks her blood sugar every other day and has been normal.  Currently she has hyperglycemia, we will monitor on correction dose insulin therapy.    Glycohemoglobin   Date Value Ref Range Status   10/19/2019 8.5 (H) 0.0 - 5.6 % Final     Comment:   06/01/2011 6.9 (H) 4.0 - 5.6 % Final     Comment:     Results from last 7 days   Lab Units 10/20/19  1243 10/20/19  0520 10/19/19  2211 10/19/19  1819 10/19/19  1333 10/19/19  0906   ACCU CHECK GLUCOSE 788 mg/dL 183* 157* 176* 142* 193* 158*         Hypokalemia- (present on admission)  Assessment & Plan  10/19-10/21 - resolved  Results from last 7 days   Lab Units 10/22/19  0316 10/21/19  0230 10/19/19  0543 10/18/19  2145   POTASSIUM 102 mmol/L 4.4 4.2 3.9 3.2*   CREATININE 109 mg/dL 1.94* 1.65* 1.58* 1.70*    IF DOMINGO AMER [id number] mL/min/1.73 m 2 32* 39* 41* 38*   IF NON  AMER 109GFRB mL/min/1.73 m 2 27* 32* 34* 31*   MAGNESIUM 561 mg/dL 1.8 1.7  --   --                VTE prophylaxis: heparin drip    Imaging  MR-BRAIN-W/O   Final Result         1.  Moderate size acute area of infarction involving the left posterior temporal, left parietal-occipital, and lateral occipital lobes.      2.  Small chronic area of lacunar infarction involving the left thalamus.      3.  Minimal periventricular, juxtacortical, and pontine white matter changes consistent with chronic microvascular ischemic gliosis      MR-MRA HEAD-W/O   Final Result         MRA OF THE [place] OF [place] WITHIN NORMAL LIMITS.      EC-ECHOCARDIOGRAM COMPLETE W/ CONT   Final Result      US-CAROTID DOPPLER BILAT   Final Result      CT-HEAD W/O   Final Result      1.  Acute LEFT parietal infarct again seen, unchanged.   2.  No associated hemorrhage demonstrated.         OUTSIDE IMAGES-CT HEAD   Final Result      OUTSIDE IMAGES-DX CHEST   Final Result      CL-RIGHT AND LEFT HEART CATHETERIZATION W/NITROUS OXIDE    (Results Pending)

## 2019-10-22 NOTE — PROGRESS NOTES
Hospital Medicine Daily Progress Note    Date of Service: 10/21/2019 Code Status: Full Code     Chief Complaint  Chief Complaint   Patient presents with   • Atrial Fibrillation     pt was in afib RVR at outside facility in the 170's   • Sent by MD     [facility]       Consultants/Specialty: Cardiology, Neurology Disposition: Remain IP     Hospital Course     ER and Admission Day course  53 y.o. female with prior history of diabetes mellitus not currently on medication regimen, and recent cerebrovascular accident with now Broca's aphasia, was in her usual state of health until the day prior to admission.  She reports the onset of chest tightness, this was without exacerbating or alleviating factors and, and did not radiate anywhere.  She denies palpitations.  She has no other symptoms including shortness of breath, abdominal pain, nausea vomiting diarrhea or constipation.  No recent fever or chills       Interval Problem Update  JORGE-worsening, Cr now is 1.91.   A fib-TELE showed HR . Denies any truly associated symptoms.   Broca's aphasia-resolved. Hold AC for possible cardiac cath.       Review of Systems  Review of Systems   Constitutional: Negative for fever and malaise/fatigue.        Frustrated   Respiratory: Negative for cough and shortness of breath.    Cardiovascular: Positive for palpitations. Negative for chest pain.   Skin: Negative for itching and rash.   Neurological: Negative for tingling and headaches.   Psychiatric/Behavioral: Negative for depression. The patient is not nervous/anxious.    All other systems reviewed and are negative.   Physical Exam  Physical Exam   Constitutional: No distress.   Body mass index is 37.3 kg/m².     Cardiovascular:   IRIR   Pulmonary/Chest: No stridor. No respiratory distress. She has no wheezes.   Abdominal: She exhibits no distension. There is no tenderness.   Musculoskeletal: She exhibits no edema.   Neurological: She is alert.   Skin: Skin is  warm and dry. She is not diaphoretic.   Nursing note and vitals reviewed.       I/Os  No intake or output data in the 24 hours ending 10/22/19 1609 Vital Signs  Temp:  [35.9 °C (96.6 °F)-36.7 °C (98.1 °F)] 35.9 °C (96.6 °F)  Pulse:  [] 91  Resp:  [17-18] 18  BP: (101-136)/(60-89) 128/73  SpO2:  [94 %-97 %] 95 %     Laboratory           Medications    Scheduled medications:  metoprolol 50 mg Oral Q8HRS   digoxin 125 mcg Oral DAILY AT 1800   aspirin 81 mg Oral DAILY   atorvastatin 40 mg Oral Q EVENING   senna-docusate 2 Tab Oral BID   insulin regular 1-6 Units Subcutaneous 4X/DAY [facility]   Dictation #1  MRN:[id number]  CSN:[id number]       Medications were reviewed today.      Assessment/Plan  * AF (atrial fibrillation) (HCC)- (present on admission)  Assessment & Plan  -HR fluctuates to 120  -monitor  -No DOAC today in deference to cardiac cath possibly tomorrow  -monitor on tele, replace Mg/K  -continue metoprolol 50 mg Q8 and convert to XL tomorrow most likely  -digoxin 125 mcg daily    Acute combined systolic and diastolic heart failure (HCC)- (present on admission)  Assessment & Plan  -defer cath again today for above reasons  -remains without chest pain at this time    Cerebral infarction (HCC)- (present on admission)  Assessment & Plan  -remains stable, DOAC once cardiac cath done (or note done)  -no new neurological symptoms  10/19 - Stroke protocol/order set placed, patient has had expressive aphasia symptoms for 3 days, not 3 weeks.  Has had dizziness/weird sensations for the last 3 days.  CT head showed Acute LEFT parietal infarct.  MR brain ordered by neurology, CT head here does show stable size from outside facility, Peurology consulted, cannot anti-coagulate for 3 days post stroke, NIH 2.  Patient states she knew things were wrong 3 days ago, but didn't come to the hospital because she was afraid what they'd find.  Really frustrated by the broca' aphasia.    Broca's aphasia- (present on  admission)  Assessment & Plan  -doing well today    JORGE (acute kidney injury) (HCC)- (present on admission)  Assessment & Plan  -Acute on CKD II/III?  -Cr worse today at 1.91  -stop diuretics, defer cardiac cath and stopped aldactone and ACE  -Urine lytes  -renal US  -if worsens tomorrow, will need nephrology consult      Bilateral carotid artery stenosis- (present on admission)  Assessment & Plan  10/21 - stable  10/20 - US carotid with <50% stenosis bilaterally    Smoker- (present on admission)  Assessment & Plan  10/21 - Stable.  10/20 - discussion of smoking cessation ~4 minutes about her smoking as a exacerbating factor to cause her stroke, patient amenable, interested in resources  10/19 - stable for now.    Class 2 obesity due to excess calories without serious comorbidity with body mass index (BMI) of 36.0 to 36.9 in adult- (present on admission)  Assessment & Plan  Body mass index is 36.21 kg/m².  10/19-10/21 - stable  10/18 - adm    Type 2 diabetes mellitus with hyperglycemia, without long-term current use of insulin (HCC)- (present on admission)  Assessment & Plan   - stable on current plan, no changes  -ISS, adjust PRN to keep BS less than 150       VTE prophylaxis: heparin

## 2019-10-22 NOTE — PROGRESS NOTES
Cardiology Follow Up Progress Note    Date of Service  10/22/2019    Attending Physician  Vasquez Milan M.D.    Chief Complaint   Expressive aphasia    Cardiology consult   afib     HPI  Judith Vallecillo is a 53 y.o. female admitted 10/18/2019 with expressive aphasia. Transferred from [facility]. She was noted to have acute CVA (MRI showed acute area of infarction involving left posterior temporal,  and found to be afib. ECHO showed ef 20%    History of diabetes, cva and tobacco abuse     Interim Events  10/22/19: patient resting in a chair, denies any chest pain,sob or dizziness. Denies any palpitations. afib on the monitor rate 90-130s. On digoxin loading. Upset the cath was cancelled today, some residual of expressive aphasia. Siblings at the bedside.     Review of Systems  Review of Systems   Constitutional: Negative for chills, diaphoresis and fever.   HENT: Negative for nosebleeds and trouble swallowing.    Respiratory: Negative for cough, chest tightness and shortness of breath.    Cardiovascular: Negative for chest pain, palpitations and leg swelling.   Gastrointestinal: Negative for abdominal distention, abdominal pain and blood in stool.   Genitourinary: Negative for hematuria.   Skin: Negative for color change.   Neurological: Negative for dizziness, syncope and numbness.   Psychiatric/Behavioral: Negative for agitation and confusion. The patient is nervous/anxious.        Vital signs in last 24 hours  Temp:  [35.9 °C (96.6 °F)-36.7 °C (98.1 °F)] 36.1 °C (97 °F)  Pulse:  [] 98  Resp:  [15-18] 18  BP: (100-136)/(60-91) 136/89  SpO2:  [94 %-97 %] 96 %    Physical Exam  Physical Exam   Constitutional: She is oriented to person, place, and time. She appears well-developed and well-nourished. No distress.   HENT:   Head: Normocephalic.   Neck: Normal range of motion. No JVD present.   Cardiovascular: Normal heart sounds and intact distal pulses. An irregularly irregular rhythm present.  Tachycardia present.   No murmur heard.  Pulmonary/Chest: Effort normal and breath sounds normal. No respiratory distress. She has no wheezes.   Abdominal: She exhibits no distension. There is no tenderness.   Musculoskeletal: She exhibits no edema or tenderness.   Neurological: She is alert and oriented to person, place, and time.   Expressive aphasia    Skin: Skin is warm and dry. No rash noted. She is not diaphoretic.   Psychiatric: Her behavior is normal. Judgment normal.   Nursing note and vitals reviewed.      Lab Review  Lab Results   Component Value Date/Time    WBC 5.4 10/19/2019 05:43 AM    RBC 3.45 (L) 10/19/2019 05:43 AM    HEMOGLOBIN 10.1 (L) 10/19/2019 05:43 AM    HEMATOCRIT 33.8 (L) 10/19/2019 05:43 AM    MCV 98.0 (H) 10/19/2019 05:43 AM    MCH 29.3 10/19/2019 05:43 AM    MCHC 29.9 (L) 10/19/2019 05:43 AM    MPV 11.2 10/19/2019 05:43 AM      Lab Results   Component Value Date/Time    SODIUM 136 10/22/2019 03:16 AM    POTASSIUM 4.4 10/22/2019 03:16 AM    CHLORIDE 103 10/22/2019 03:16 AM    CO2 24 10/22/2019 03:16 AM    GLUCOSE 144 (H) 10/22/2019 03:16 AM    BUN 34 (H) 10/22/2019 03:16 AM    CREATININE 1.94 (H) 10/22/2019 03:16 AM      Lab Results   Component Value Date/Time    ASTSGOT 13 10/18/2019 09:45 PM    ALTSGPT 10 10/18/2019 09:45 PM     Lab Results   Component Value Date/Time    CHOLSTRLTOT 117 10/20/2019 02:59 AM    LDL 62 10/20/2019 02:59 AM    HDL 23 (A) 10/20/2019 02:59 AM    TRIGLYCERIDE 162 (H) 10/20/2019 02:59 AM    TROPONINT 22 (H) 10/19/2019 11:36 AM       Recent Labs     10/20/19  0259   NTPROBNP 5058*       Cardiac Imaging and Procedures Review  EKG:    ATRIAL FIBRILLATION, V-RATE     MULTIPLE VENTRICULAR PREMATURE COMPLEXES   PROBABLE LATERAL INFARCT, AGE INDETERMINATE   CONSIDER ANTEROSEPTAL INFARCT   BORDERLINE T ABNORMALITIES, INFERIOR LEAD     Electronically Signed On 10- 22:24:31 PDT by RICHIE JEFF. AMD     Echocardiogram:    10/19/19  Four chamber dilation.  Biventricular failure. LV systolic function is   severely reduced. LVEF 20% with global hypokinesis. No significant   valuvlar disease visualized on this study. RVSP estimated at 40 mmHg.   IVC is dilated and fails to collapse with inspiration suggesting   elevated central venous pressures. Compared to the images of the study   done on 06/01/2011 there has been a severe decline in biventricular   Function.    Cardiac Catheterization: pending     MRI brain  10/19/19  1.  Moderate size acute area of infarction involving the left posterior temporal, left parietal-occipital, and lateral occipital lobes.    2.  Small chronic area of lacunar infarction involving the left thalamus.    3.  Minimal periventricular, juxtacortical, and pontine white matter changes consistent with chronic microvascular ischemic gliosis    Carotid Duplex  10/19/19   Mild bilateral internal carotid artery stenosis (<50%).    Thyroid nodules measuring less than 1 cm.    Assessment/Plan  No new Assessment & Plan notes have been filed under this hospital service since the last note was generated.  Service: Cardiology    1. Atrial fibrillation:  - rate 90-130s  - continue digoxin loading  - increase metoprolol 50mg TID for rate control   - oral anticoagulation, on hold due to potential cardiac catheterization    2. Acute biventricular heart failure:  - ECHO showed four chamber dilation   - rule out ischemia once creatine stabilize  - continue bb therapy   - euvolemic   - holding arb and aldactone due to JORGE    3. Acute CVA;  - continue asa and statin therapy     4. JORGE:  - creatinine trending down 1.94        Thank you for allowing me to participate in the care of this patient.  I will continue to follow this patient    Please contact me with any questions.    NAVJOT Arceo   [facility]

## 2019-10-22 NOTE — DISCHARGE PLANNING
Anticipated Discharge Disposition: Home without skilled needs.    Action: Assessment done. Demographics on face sheet confirmed by pt. Pt is a  in [place], [place]., and is independent with ADL/IADL's. Pt continues to ride her horses daily while working full time. Pt uses [facility] in [facility], and can afford meds. Mom and brother at bedside. Family will be ride home on day of dc. Heart cath pending for tomorrow per pt and family.     Barriers to Discharge: Medical clearance.    Plan: Continue to assist with dc needs as they are identified.    Care Transition Team Assessment    Information Source  Orientation : Oriented x 4  Information Given By: Patient  Who is responsible for making decisions for patient? : Patient    Readmission Evaluation  Is this a readmission?: No    Elopement Risk  Legal Hold: No  Ambulatory or Self Mobile in Wheelchair: Yes  Disoriented: No  Psychiatric Symptoms: None  History of Wandering: No  Elopement this Admit: No  Vocalizing Wanting to Leave: No  Displays Behaviors, Body Language Wanting to Leave: No-Not at Risk for Elopement  Elopement Risk: Not at Risk for Elopement    Interdisciplinary Discharge Planning  Does Admitting Nurse Feel This Could be a Complex Discharge?: No  Lives with - Patient's Self Care Capacity: Alone and Able to Care For Self  Patient or legal guardian wants to designate a caregiver (see row info): No  Support Systems: Family Member(s)  Housing / Facility: [address]  Do You Take your Prescribed Medications Regularly: Yes  Able to Return to Previous ADL's: Yes  Mobility Issues: No  Prior Services: None, Home-Independent  Patient Expects to be Discharged to:: (home.)  Assistance Needed: No    Discharge Preparedness  What is your plan after discharge?: Home with help  What are your discharge supports?: Parent, Sibling  Prior Functional Level: Independent with Activities of Daily Living  Difficulity with ADLs: None  Difficulity with IADLs:  None    Functional Assesment  Prior Functional Level: Independent with Activities of Daily Living    Finances  Financial Barriers to Discharge: No  Prescription Coverage: Yes    Vision / Hearing Impairment  Vision Impairment : No  Hearing Impairment : No              Domestic Abuse  Have you ever been the victim of abuse or violence?: No  Physical Abuse or Sexual Abuse: No  Verbal Abuse or Emotional Abuse: No  Possible Abuse Reported to:: Not Applicable         Discharge Risks or Barriers  Discharge risks or barriers?: No

## 2019-10-22 NOTE — CARE PLAN
Problem: Safety  Goal: Will remain free from injury  Outcome: PROGRESSING AS EXPECTED  Pt educated on safety precautions and precautions in place. Treaded socks on, bed locked and in lowest position, belongings and call light within reach.       Problem: Knowledge Deficit  Goal: Knowledge of disease process/condition, treatment plan, diagnostic tests, and medications will improve  Outcome: PROGRESSING AS EXPECTED   Patient educated on disease process, treatment plan, medications, and plan of care for today. Patient verbalized understanding and no additional questions at this time.

## 2019-10-22 NOTE — CARE PLAN
Problem: Communication  Goal: The ability to communicate needs accurately and effectively will improve  Outcome: PROGRESSING AS EXPECTED  Note:   Pt actively participates in POC. Pt and board updated, all questions and concerns answered.  Pt verbalized understanding that she will be NPO at midnight for cath lab tomorrow- 10/22/19.     Problem: Safety  Goal: Will remain free from injury  Outcome: PROGRESSING AS EXPECTED  Note:   Safety and fall education given. All fall precautions are in place with belongings at bedside table. Needs are tended to. Educated to use call light for assistance. Pt verbalized understanding.       Problem: Pain Management  Goal: Pain level will decrease to patient's comfort goal  Outcome: PROGRESSING AS EXPECTED  Note:   Pt has no reports of pain throughout shift.

## 2019-10-22 NOTE — PROGRESS NOTES
Bedside report received and patient care assumed. Pt up self in restroom, now resting in bed, A&O4, with no complaints of pain, and is on RA. Tele box on. All fall precautions are in place, belongings at bedside table.  Pt was updated on POC, no questions or concerns. Pt educated on use of call light for assistance. Will continue to monitor.

## 2019-10-22 NOTE — PROGRESS NOTES
Report received at bedside from NOC RN, pt care assumed, tele box on. Pt aaox4, no signs of distress noted at this time. Patient resting comfortably in bed. POC discussed with pt and verbalizes no questions. Pt denies any additional needs at this time. Bed in lowest position, pt educated on fall risk and verbalized understanding, call light within reach.    Pt off the floor with transport for Renal US, monitor room notified.

## 2019-10-22 NOTE — THERAPY
"Speech Language Therapy Evaluation completed to address speech and cognition  Functional Status:  Patient reports aphasia on admit but this has greatly resolved. Bedside WAB completed. The patient had occasional dysarthria at the phrase and spontaneous conversation level. She reports slurred speech is baseline from her old stroke and that she feels just about baseline. Her sister and brother at bedside report her speech is slightly worse than normal but significantly improved since admit. Attention appeared to be impaired so a cognitive evaluation was also completed. She presents with mild impairment in attention and executive function/organization. She made a comment while drawing a clock \"I use digital\" and placed the hands 5 minutes off of the time requested. Yet her analog wrist watch was on the bedside table. She was able to tell time adequately. The patient feels like she is baseline cognitively and reports she always \"spaces out.\"   Recommendations:  Recommend outpatient SLP services to further address any cognitive-linguistic deficits that may not be baseline and identified at home. Brother and sister report they will be with the patient until Saturday but her sister can stay an extra week if needed.    Plan of Care: Will benefit from Speech Therapy 2 times per week  Post-Acute Therapy: Recommend outpatient or home health transitional care services for continued speech therapy services      See \"Rehab Therapy-Acute\" Patient Summary Report for complete documentation.   "

## 2019-10-22 NOTE — THERAPY
Order received for OT eval.  Nsg reports pt is scheduled for cath lab tomorrow.  Nsg states pt is up self in room.  Will hold OT eval until after cath lab completed.  Pt does not appear to have any Acute OT needs at this time.  Will monitor.  Discussed with Nsg.

## 2019-10-23 ENCOUNTER — APPOINTMENT (OUTPATIENT)
Dept: RADIOLOGY | Facility: MEDICAL CENTER | Age: 54
DRG: 981 | End: 2019-10-23
Attending: NURSE PRACTITIONER
Payer: COMMERCIAL

## 2019-10-23 LAB
ANION GAP SERPL CALC-SCNC: 10 MMOL/L (ref 0–11.9)
BUN SERPL-MCNC: 32 MG/DL (ref 8–22)
CALCIUM SERPL-MCNC: 8.9 MG/DL (ref 8.5–10.5)
CHLORIDE SERPL-SCNC: 102 MMOL/L (ref 96–112)
CO2 SERPL-SCNC: 25 MMOL/L (ref 20–33)
CREAT SERPL-MCNC: 1.64 MG/DL (ref 0.5–1.4)
DIGOXIN SERPL-MCNC: 1 NG/ML (ref 0.8–2)
GLUCOSE BLD-MCNC: 105 MG/DL (ref 65–99)
GLUCOSE BLD-MCNC: 117 MG/DL (ref 65–99)
GLUCOSE BLD-MCNC: 166 MG/DL (ref 65–99)
GLUCOSE SERPL-MCNC: 113 MG/DL (ref 65–99)
MAGNESIUM SERPL-MCNC: 1.7 MG/DL (ref 1.5–2.5)
POTASSIUM SERPL-SCNC: 3.9 MMOL/L (ref 3.6–5.5)
SODIUM SERPL-SCNC: 137 MMOL/L (ref 135–145)

## 2019-10-23 PROCEDURE — 83735 ASSAY OF MAGNESIUM: CPT

## 2019-10-23 PROCEDURE — A9270 NON-COVERED ITEM OR SERVICE: HCPCS | Performed by: NURSE PRACTITIONER

## 2019-10-23 PROCEDURE — 99233 SBSQ HOSP IP/OBS HIGH 50: CPT | Performed by: INTERNAL MEDICINE

## 2019-10-23 PROCEDURE — 700111 HCHG RX REV CODE 636 W/ 250 OVERRIDE (IP): Performed by: INTERNAL MEDICINE

## 2019-10-23 PROCEDURE — A9270 NON-COVERED ITEM OR SERVICE: HCPCS | Performed by: STUDENT IN AN ORGANIZED HEALTH CARE EDUCATION/TRAINING PROGRAM

## 2019-10-23 PROCEDURE — 4410556 CT-CARDIAC SCORING

## 2019-10-23 PROCEDURE — 80048 BASIC METABOLIC PNL TOTAL CA: CPT

## 2019-10-23 PROCEDURE — 82962 GLUCOSE BLOOD TEST: CPT

## 2019-10-23 PROCEDURE — 700102 HCHG RX REV CODE 250 W/ 637 OVERRIDE(OP): Performed by: NURSE PRACTITIONER

## 2019-10-23 PROCEDURE — 700105 HCHG RX REV CODE 258: Performed by: NURSE PRACTITIONER

## 2019-10-23 PROCEDURE — 36415 COLL VENOUS BLD VENIPUNCTURE: CPT

## 2019-10-23 PROCEDURE — 770020 HCHG ROOM/CARE - TELE (206)

## 2019-10-23 PROCEDURE — 700102 HCHG RX REV CODE 250 W/ 637 OVERRIDE(OP): Performed by: STUDENT IN AN ORGANIZED HEALTH CARE EDUCATION/TRAINING PROGRAM

## 2019-10-23 PROCEDURE — 80162 ASSAY OF DIGOXIN TOTAL: CPT

## 2019-10-23 RX ORDER — HEPARIN SODIUM 5000 [USP'U]/ML
5000 INJECTION, SOLUTION INTRAVENOUS; SUBCUTANEOUS EVERY 8 HOURS
Status: DISCONTINUED | OUTPATIENT
Start: 2019-10-23 | End: 2019-10-24

## 2019-10-23 RX ORDER — SODIUM CHLORIDE 9 MG/ML
INJECTION, SOLUTION INTRAVENOUS CONTINUOUS
Status: CANCELLED | OUTPATIENT
Start: 2019-10-24

## 2019-10-23 RX ADMIN — INSULIN HUMAN 1 UNITS: 100 INJECTION, SOLUTION PARENTERAL at 18:09

## 2019-10-23 RX ADMIN — ASPIRIN 81 MG 81 MG: 81 TABLET ORAL at 05:44

## 2019-10-23 RX ADMIN — HEPARIN SODIUM 5000 UNITS: 5000 INJECTION, SOLUTION INTRAVENOUS; SUBCUTANEOUS at 18:09

## 2019-10-23 RX ADMIN — ATORVASTATIN CALCIUM 40 MG: 40 TABLET, FILM COATED ORAL at 18:09

## 2019-10-23 RX ADMIN — METOPROLOL TARTRATE 50 MG: 50 TABLET, FILM COATED ORAL at 20:56

## 2019-10-23 RX ADMIN — SODIUM CHLORIDE: 9 INJECTION, SOLUTION INTRAVENOUS at 00:00

## 2019-10-23 RX ADMIN — METOPROLOL TARTRATE 50 MG: 50 TABLET, FILM COATED ORAL at 05:44

## 2019-10-23 RX ADMIN — DIGOXIN 125 MCG: 125 TABLET ORAL at 18:08

## 2019-10-23 ASSESSMENT — ENCOUNTER SYMPTOMS
TROUBLE SWALLOWING: 0
ABDOMINAL PAIN: 0
DIZZINESS: 0
NERVOUS/ANXIOUS: 1
HEADACHES: 0
AGITATION: 0
SHORTNESS OF BREATH: 0
CHILLS: 0
NAUSEA: 0
FEVER: 0
BLOOD IN STOOL: 0
TINGLING: 0
NUMBNESS: 0
CHEST TIGHTNESS: 0
DIAPHORESIS: 0
COUGH: 0
CONFUSION: 0
COLOR CHANGE: 0
DEPRESSION: 0
NERVOUS/ANXIOUS: 0
VOMITING: 0
ABDOMINAL DISTENTION: 0
PALPITATIONS: 0

## 2019-10-23 NOTE — PROGRESS NOTES
Telemetry Shift Summary     Rhythm: A-Fib  HR Range:   Ectopy: Rare PVC's  Measurements: -/0.08/-           Normal Values  Rhythm SR  HR Range    Measurements 0.12-0.20 / 0.06-0.10  / 0.30-0.52    12 Hr CC

## 2019-10-23 NOTE — PROGRESS NOTES
[facility] Medicine Daily Progress Note    Date of Service: 10/21/2019 Code Status: Full Code     Chief Complaint  Chief Complaint   Patient presents with   • Atrial Fibrillation     pt was in afib RVR at outside facility in the 170's   • Sent by MD     transfer [facility]       Consultants/Specialty: Cardiology, Neurology Disposition: Remain IP     Hospital Course     ER and Admission Day course  53 y.o. female with prior history of diabetes mellitus not currently on medication regimen, and recent cerebrovascular accident with now Broca's aphasia, was in her usual state of health until the day prior to admission.  She reports the onset of chest tightness, this was without exacerbating or alleviating factors and, and did not radiate anywhere.  She denies palpitations.  She has no other symptoms including shortness of breath, abdominal pain, nausea vomiting diarrhea or constipation.  No recent fever or chills       Interval Problem Update  JORGE-Cr has improved. Urinating ok.   A fib-TELE showed last night  with bursts to 154, short lived, 2.3 second pause.   Broca's aphasia-no recurrence. Hold AC for possible cardiac cath.       Review of Systems  Review of Systems   Constitutional: Negative for fever and malaise/fatigue.        Frustrated-unchanged today   HENT: Negative for hearing loss and tinnitus.    Respiratory: Negative for cough and shortness of breath.    Cardiovascular: Negative for chest pain, palpitations and leg swelling.   Gastrointestinal: Negative for abdominal pain, nausea and vomiting.   Skin: Negative for rash.   Neurological: Negative for tingling and headaches.   Psychiatric/Behavioral: Negative for depression. The patient is not nervous/anxious.    All other systems reviewed and are negative.   Physical Exam  Physical Exam   Constitutional: No distress.   Body mass index is 37.3 kg/m².     Cardiovascular:   IRIR   Pulmonary/Chest: No stridor. She has no rales.   Abdominal: There is no  tenderness.   Musculoskeletal: She exhibits no edema.   Neurological: She is alert.   Skin: Skin is warm and dry. She is not diaphoretic.   Nursing note and vitals reviewed.       I/Os    Intake/Output Summary (Last 24 hours) at 10/23/2019 1506  Last data filed at 10/23/2019 1300  Gross per 24 hour   Intake 620 ml   Output --   Net 620 ml    Vital Signs  Temp:  [35.9 °C (96.6 °F)-36.4 °C (97.5 °F)] 36.3 °C (97.3 °F)  Pulse:  [] 71  Resp:  [16-18] 16  BP: (118-152)/(54-89) 152/89  SpO2:  [92 %-98 %] 92 %     Laboratory           Medications    Scheduled medications:  metoprolol 50 mg Oral Q8HRS   digoxin 125 mcg Oral DAILY AT 1800   aspirin 81 mg Oral DAILY   atorvastatin 40 mg Oral Q EVENING   senna-docusate 2 Tab Oral BID   insulin regular 1-6 Units Subcutaneous 4X/DAY ACHS        Medications were reviewed today.      Assessment/Plan  * AF (atrial fibrillation) (HCC)- (present on admission)  Assessment & Plan  -HR fluctuates still  -monitor  -No DOAC today in deference to cardiac cath, prelim CAC score is 412, indicating greater than 90 percentile score for her age group (CA with highest score was the LAD)  -monitor on tele, replace Mg/K  -continue metoprolol 50 mg Q8- might consider increasing to metoprolol 100 mg BID  -digoxin 125 mcg daily    Acute combined systolic and diastolic heart failure (HCC)- (present on admission)  Assessment & Plan  -defer cath again today for above reasons  -remains without chest pain at this time    Cerebral infarction (HCC)- (present on admission)  Assessment & Plan  -remains stable, DOAC once cardiac cath done (or not done)-still holding at this time  -no new neurological symptoms  10/19 - Stroke protocol/order set placed, patient has had expressive aphasia symptoms for 3 days, not 3 weeks.  Has had dizziness/weird sensations for the last 3 days.  CT head showed Acute LEFT parietal infarct.  MR brain ordered by neurology, CT head here does show stable size from outside  facility.    Broca's aphasia- (present on admission)  Assessment & Plan  -doing well today    JORGE (acute kidney injury) (HCC)- (present on admission)  Assessment & Plan  -Acute on CKD II/III?  -Cr has improved to 1.64  -stop diuretics, defer cardiac cath and stopped aldactone and ACE  -Urine lytes  -renal US normal    Bilateral carotid artery stenosis- (present on admission)  Assessment & Plan  10/21 - stable  10/20 - US carotid with <50% stenosis bilaterally    Smoker- (present on admission)  Assessment & Plan  10/21 - Stable.  10/20 - discussion of smoking cessation ~4 minutes about her smoking as a exacerbating factor to cause her stroke, patient amenable, interested in resources  10/19 - stable for now.    Class 2 obesity due to excess calories without serious comorbidity with body mass index (BMI) of 36.0 to 36.9 in adult- (present on admission)  Assessment & Plan  Body mass index is 36.21 kg/m².  10/19-10/21 - stable  10/18 - adm    Type 2 diabetes mellitus with hyperglycemia, without long-term current use of insulin (HCC)- (present on admission)  Assessment & Plan   - stable on current plan, no changes  -ISS, adjust PRN to keep BS less than 150       VTE prophylaxis: heparin

## 2019-10-23 NOTE — DISCHARGE PLANNING
F/U for Rehab. Current documentation does not reflect 2/3 therapy need meeting CMS criteria for IRF level care. PT 10/20/19 indicates pt supervised for bed mobility/transfers/gait - no further skilled PT needs in acute setting at that time. Per Dr. Milan's progress note 10/22/19 - Broca's aphasia resolved.  SLP jazz 10/22/19 - pt reports feels like she is at baseline. SLP recommending outpatient SLP services to further address any cognitive-linguistic deficits. Anticipate post acute needs can be met at Home Health/Outpaitent level care. This referral will not be forwarded for Physiatry consult per protocol. If there are interval changes, please re-consult. Thank you for the referral.

## 2019-10-23 NOTE — CARE PLAN
Problem: Safety  Goal: Will remain free from falls  Intervention: Implement fall precautions  Note:   Fall precautions verified in place. Call light and personal belongings in reach. Pt educated on risk for falls. Treaded slipper socks on both feet. Hourly rounding in place.        Problem: Infection  Goal: Will remain free from infection  Intervention: Implement standard precautions and perform hand washing before and after patient contact  Note:   Hand hygiene performed before and after contact with patient.

## 2019-10-23 NOTE — PROGRESS NOTES
Cardiology Follow Up Progress Note    Date of Service  10/23/2019    Attending Physician  Vasquez Milan M.D.    Chief Complaint   Expressive aphasia    Cardiology consult   afib     HPI  Judith Vallecillo is a 53 y.o. female admitted 10/18/2019 with expressive aphasia. Transferred from [facility]. She was noted to have acute CVA (MRI showed acute area of infarction involving left posterior temporal,  and found to be afib. ECHO showed ef 20%    History of diabetes, cva and tobacco abuse     Interim Events  10/23/19: patient resting in bed, mild palpitations overnight. Denies any chest pain, SOB or dizziness. Overnight afib up to 154 nonsustained.     10/22/19: patient resting in a chair, denies any chest pain,sob or dizziness. Denies any palpitations. afib on the monitor rate 90-130s. On digoxin loading. Upset the cath was cancelled today, some residual of expressive aphasia. Siblings at the bedside.     Review of Systems  Review of Systems   Constitutional: Negative for chills, diaphoresis and fever.   HENT: Negative for nosebleeds and trouble swallowing.    Respiratory: Negative for cough, chest tightness and shortness of breath.    Cardiovascular: Negative for chest pain, palpitations and leg swelling.   Gastrointestinal: Negative for abdominal distention, abdominal pain and blood in stool.   Genitourinary: Negative for hematuria.   Skin: Negative for color change.   Neurological: Negative for dizziness, syncope and numbness.   Psychiatric/Behavioral: Negative for agitation and confusion. The patient is nervous/anxious.        Vital signs in last 24 hours  Temp:  [35.9 °C (96.6 °F)-36.4 °C (97.5 °F)] 35.9 °C (96.6 °F)  Pulse:  [] 54  Resp:  [16-18] 16  BP: (118-141)/(54-88) 141/54  SpO2:  [94 %-98 %] 97 %    Physical Exam  Physical Exam   Constitutional: She is oriented to person, place, and time. She appears well-developed and well-nourished. No distress.   HENT:   Head: Normocephalic.   Neck:  Normal range of motion. No JVD present.   Cardiovascular: Normal heart sounds and intact distal pulses. An irregularly irregular rhythm present. Tachycardia present.   No murmur heard.  Pulmonary/Chest: Effort normal and breath sounds normal. No respiratory distress. She has no wheezes.   Abdominal: She exhibits no distension. There is no tenderness.   Musculoskeletal: She exhibits no edema or tenderness.   Neurological: She is alert and oriented to person, place, and time.   Expressive aphasia    Skin: Skin is warm and dry. No rash noted. She is not diaphoretic.   Psychiatric: Her behavior is normal. Judgment normal.   Nursing note and vitals reviewed.      Lab Review  Lab Results   Component Value Date/Time    WBC 6.6 10/22/2019 11:24 AM    RBC 4.20 10/22/2019 11:24 AM    HEMOGLOBIN 12.7 10/22/2019 11:24 AM    HEMATOCRIT 40.2 10/22/2019 11:24 AM    MCV 95.7 10/22/2019 11:24 AM    MCH 30.2 10/22/2019 11:24 AM    MCHC 31.6 (L) 10/22/2019 11:24 AM    MPV 11.0 10/22/2019 11:24 AM      Lab Results   Component Value Date/Time    SODIUM 137 10/23/2019 02:16 AM    POTASSIUM 3.9 10/23/2019 02:16 AM    CHLORIDE 102 10/23/2019 02:16 AM    CO2 25 10/23/2019 02:16 AM    GLUCOSE 113 (H) 10/23/2019 02:16 AM    BUN 32 (H) 10/23/2019 02:16 AM    CREATININE 1.64 (H) 10/23/2019 02:16 AM      Lab Results   Component Value Date/Time    ASTSGOT 17 10/22/2019 11:24 AM    ALTSGPT 12 10/22/2019 11:24 AM     Lab Results   Component Value Date/Time    CHOLSTRLTOT 117 10/20/2019 02:59 AM    LDL 62 10/20/2019 02:59 AM    HDL 23 (A) 10/20/2019 02:59 AM    TRIGLYCERIDE 162 (H) 10/20/2019 02:59 AM    TROPONINT 22 (H) 10/19/2019 11:36 AM       No results for input(s): NTPROBNP in the last 72 hours.    Cardiac Imaging and Procedures Review  EKG:    ATRIAL FIBRILLATION, V-RATE     MULTIPLE VENTRICULAR PREMATURE COMPLEXES   PROBABLE LATERAL INFARCT, AGE INDETERMINATE   CONSIDER ANTEROSEPTAL INFARCT   BORDERLINE T ABNORMALITIES, INFERIOR LEAD      Electronically Signed On 10- 22:24:31 PDT by RICHIE JEFF. [facility]     Echocardiogram:    10/19/19  Four chamber dilation. Biventricular failure. LV systolic function is   severely reduced. LVEF 20% with global hypokinesis. No significant   valuvlar disease visualized on this study. RVSP estimated at 40 mmHg.   IVC is dilated and fails to collapse with inspiration suggesting   elevated central venous pressures. Compared to the images of the study   done on 06/01/2011 there has been a severe decline in biventricular   Function.    Cardiac Catheterization: pending     MRI brain  10/19/19  1.  Moderate size acute area of infarction involving the left posterior temporal, left parietal-occipital, and lateral occipital lobes.    2.  Small chronic area of lacunar infarction involving the left thalamus.    3.  Minimal periventricular, juxtacortical, and pontine white matter changes consistent with chronic microvascular ischemic gliosis    Carotid Duplex  10/19/19   Mild bilateral internal carotid artery stenosis (<50%).    Thyroid nodules measuring less than 1 cm.    Assessment/Plan  No new Assessment & Plan notes have been filed under this hospital service since the last note was generated.  Service: Cardiology    1. Atrial fibrillation:  - rate 90-130s  - continue digoxin 125 mcg per renal dosage   - continue metoprolol 50mg TID for rate control   - oral anticoagulation, plan for CT calicum score today     2. Acute biventricular heart failure:  - ECHO showed four chamber dilation   - rule out ischemia once creatine stabilize  - continue bb therapy   - holding arb and aldactone due to JORGE    3. Acute CVA;  - continue asa and statin therapy     4. JORGE:  - creatinine trending improving 1.64    Thank you for allowing me to participate in the care of this patient.  I will continue to follow this patient    Please contact me with any questions.    NAVJOT Arceo   [facility]

## 2019-10-23 NOTE — CARE PLAN
Problem: Nutritional:  Goal: Achieve adequate nutritional intake  Description  Diet > NPO and patient will consume 50% of meals.   Outcome: NOT MET     Patient is NPO for testing per RN verbalization. Per chart review: diet was cardiac, diabetic since 10/21. RN reports plan of care is to advance diet once test finish.

## 2019-10-24 ENCOUNTER — APPOINTMENT (OUTPATIENT)
Dept: CARDIOLOGY | Facility: MEDICAL CENTER | Age: 54
DRG: 981 | End: 2019-10-24
Attending: NURSE PRACTITIONER
Payer: COMMERCIAL

## 2019-10-24 ENCOUNTER — APPOINTMENT (OUTPATIENT)
Dept: CARDIOLOGY | Facility: MEDICAL CENTER | Age: 54
DRG: 981 | End: 2019-10-24
Attending: INTERNAL MEDICINE
Payer: COMMERCIAL

## 2019-10-24 PROBLEM — I25.10 CORONARY ARTERY DISEASE INVOLVING NATIVE CORONARY ARTERY OF NATIVE HEART WITHOUT ANGINA PECTORIS: Status: ACTIVE | Noted: 2019-10-24

## 2019-10-24 LAB
ACT BLD: 202 SEC (ref 74–137)
ANION GAP SERPL CALC-SCNC: 9 MMOL/L (ref 0–11.9)
APTT PPP: 53.2 SEC (ref 24.7–36)
BASOPHILS # BLD AUTO: 0.5 % (ref 0–1.8)
BASOPHILS # BLD: 0.03 K/UL (ref 0–0.12)
BUN SERPL-MCNC: 30 MG/DL (ref 8–22)
CALCIUM SERPL-MCNC: 9 MG/DL (ref 8.5–10.5)
CHLORIDE SERPL-SCNC: 105 MMOL/L (ref 96–112)
CO2 SERPL-SCNC: 24 MMOL/L (ref 20–33)
CREAT SERPL-MCNC: 1.49 MG/DL (ref 0.5–1.4)
EOSINOPHIL # BLD AUTO: 0.11 K/UL (ref 0–0.51)
EOSINOPHIL NFR BLD: 2 % (ref 0–6.9)
ERYTHROCYTE [DISTWIDTH] IN BLOOD BY AUTOMATED COUNT: 50.4 FL (ref 35.9–50)
GLUCOSE BLD-MCNC: 109 MG/DL (ref 65–99)
GLUCOSE BLD-MCNC: 164 MG/DL (ref 65–99)
GLUCOSE BLD-MCNC: 184 MG/DL (ref 65–99)
GLUCOSE SERPL-MCNC: 135 MG/DL (ref 65–99)
HCT VFR BLD AUTO: 37.8 % (ref 37–47)
HGB BLD-MCNC: 12 G/DL (ref 12–16)
IMM GRANULOCYTES # BLD AUTO: 0.01 K/UL (ref 0–0.11)
IMM GRANULOCYTES NFR BLD AUTO: 0.2 % (ref 0–0.9)
LYMPHOCYTES # BLD AUTO: 1.29 K/UL (ref 1–4.8)
LYMPHOCYTES NFR BLD: 23.2 % (ref 22–41)
MAGNESIUM SERPL-MCNC: 1.7 MG/DL (ref 1.5–2.5)
MCH RBC QN AUTO: 30 PG (ref 27–33)
MCHC RBC AUTO-ENTMCNC: 31.7 G/DL (ref 33.6–35)
MCV RBC AUTO: 94.5 FL (ref 81.4–97.8)
MONOCYTES # BLD AUTO: 0.61 K/UL (ref 0–0.85)
MONOCYTES NFR BLD AUTO: 11 % (ref 0–13.4)
NEUTROPHILS # BLD AUTO: 3.51 K/UL (ref 2–7.15)
NEUTROPHILS NFR BLD: 63.1 % (ref 44–72)
NRBC # BLD AUTO: 0 K/UL
NRBC BLD-RTO: 0 /100 WBC
PLATELET # BLD AUTO: 245 K/UL (ref 164–446)
PMV BLD AUTO: 10.8 FL (ref 9–12.9)
POTASSIUM SERPL-SCNC: 3.9 MMOL/L (ref 3.6–5.5)
RBC # BLD AUTO: 4 M/UL (ref 4.2–5.4)
SODIUM SERPL-SCNC: 138 MMOL/L (ref 135–145)
WBC # BLD AUTO: 5.6 K/UL (ref 4.8–10.8)

## 2019-10-24 PROCEDURE — 93458 L HRT ARTERY/VENTRICLE ANGIO: CPT | Mod: 26,59 | Performed by: INTERNAL MEDICINE

## 2019-10-24 PROCEDURE — 027035Z DILATION OF CORONARY ARTERY, ONE ARTERY WITH TWO DRUG-ELUTING INTRALUMINAL DEVICES, PERCUTANEOUS APPROACH: ICD-10-PCS | Performed by: INTERNAL MEDICINE

## 2019-10-24 PROCEDURE — 85347 COAGULATION TIME ACTIVATED: CPT

## 2019-10-24 PROCEDURE — 700101 HCHG RX REV CODE 250

## 2019-10-24 PROCEDURE — A9270 NON-COVERED ITEM OR SERVICE: HCPCS

## 2019-10-24 PROCEDURE — 92928 PRQ TCAT PLMT NTRAC ST 1 LES: CPT | Mod: LD | Performed by: INTERNAL MEDICINE

## 2019-10-24 PROCEDURE — 700111 HCHG RX REV CODE 636 W/ 250 OVERRIDE (IP): Performed by: INTERNAL MEDICINE

## 2019-10-24 PROCEDURE — 700111 HCHG RX REV CODE 636 W/ 250 OVERRIDE (IP)

## 2019-10-24 PROCEDURE — B2111ZZ FLUOROSCOPY OF MULTIPLE CORONARY ARTERIES USING LOW OSMOLAR CONTRAST: ICD-10-PCS | Performed by: INTERNAL MEDICINE

## 2019-10-24 PROCEDURE — 700102 HCHG RX REV CODE 250 W/ 637 OVERRIDE(OP)

## 2019-10-24 PROCEDURE — 700102 HCHG RX REV CODE 250 W/ 637 OVERRIDE(OP): Performed by: INTERNAL MEDICINE

## 2019-10-24 PROCEDURE — 82962 GLUCOSE BLOOD TEST: CPT | Mod: 91

## 2019-10-24 PROCEDURE — 99233 SBSQ HOSP IP/OBS HIGH 50: CPT | Mod: 25 | Performed by: INTERNAL MEDICINE

## 2019-10-24 PROCEDURE — 85025 COMPLETE CBC W/AUTO DIFF WBC: CPT

## 2019-10-24 PROCEDURE — 99232 SBSQ HOSP IP/OBS MODERATE 35: CPT | Performed by: INTERNAL MEDICINE

## 2019-10-24 PROCEDURE — A9270 NON-COVERED ITEM OR SERVICE: HCPCS | Performed by: INTERNAL MEDICINE

## 2019-10-24 PROCEDURE — 770020 HCHG ROOM/CARE - TELE (206)

## 2019-10-24 PROCEDURE — C1887 CATHETER, GUIDING: HCPCS

## 2019-10-24 PROCEDURE — 93572 IV DOP VEL&/PRESS C FLO EA: CPT | Mod: 52,LC

## 2019-10-24 PROCEDURE — 700102 HCHG RX REV CODE 250 W/ 637 OVERRIDE(OP): Performed by: STUDENT IN AN ORGANIZED HEALTH CARE EDUCATION/TRAINING PROGRAM

## 2019-10-24 PROCEDURE — 93571 IV DOP VEL&/PRESS C FLO 1ST: CPT | Mod: 26,52,LD | Performed by: INTERNAL MEDICINE

## 2019-10-24 PROCEDURE — A9270 NON-COVERED ITEM OR SERVICE: HCPCS | Performed by: STUDENT IN AN ORGANIZED HEALTH CARE EDUCATION/TRAINING PROGRAM

## 2019-10-24 PROCEDURE — B2151ZZ FLUOROSCOPY OF LEFT HEART USING LOW OSMOLAR CONTRAST: ICD-10-PCS | Performed by: INTERNAL MEDICINE

## 2019-10-24 PROCEDURE — 700102 HCHG RX REV CODE 250 W/ 637 OVERRIDE(OP): Performed by: NURSE PRACTITIONER

## 2019-10-24 PROCEDURE — 700102 HCHG RX REV CODE 250 W/ 637 OVERRIDE(OP): Performed by: HOSPITALIST

## 2019-10-24 PROCEDURE — A9270 NON-COVERED ITEM OR SERVICE: HCPCS | Performed by: NURSE PRACTITIONER

## 2019-10-24 PROCEDURE — 83735 ASSAY OF MAGNESIUM: CPT

## 2019-10-24 PROCEDURE — 99152 MOD SED SAME PHYS/QHP 5/>YRS: CPT | Performed by: INTERNAL MEDICINE

## 2019-10-24 PROCEDURE — 85730 THROMBOPLASTIN TIME PARTIAL: CPT

## 2019-10-24 PROCEDURE — 700117 HCHG RX CONTRAST REV CODE 255: Performed by: INTERNAL MEDICINE

## 2019-10-24 PROCEDURE — 93572 IV DOP VEL&/PRESS C FLO EA: CPT | Mod: 26,52,LC | Performed by: INTERNAL MEDICINE

## 2019-10-24 PROCEDURE — 4A023N7 MEASUREMENT OF CARDIAC SAMPLING AND PRESSURE, LEFT HEART, PERCUTANEOUS APPROACH: ICD-10-PCS | Performed by: INTERNAL MEDICINE

## 2019-10-24 PROCEDURE — A9270 NON-COVERED ITEM OR SERVICE: HCPCS | Performed by: HOSPITALIST

## 2019-10-24 PROCEDURE — 36415 COLL VENOUS BLD VENIPUNCTURE: CPT

## 2019-10-24 PROCEDURE — 80048 BASIC METABOLIC PNL TOTAL CA: CPT

## 2019-10-24 RX ORDER — LIDOCAINE HYDROCHLORIDE 20 MG/ML
INJECTION, SOLUTION INFILTRATION; PERINEURAL
Status: COMPLETED
Start: 2019-10-24 | End: 2019-10-24

## 2019-10-24 RX ORDER — FUROSEMIDE 10 MG/ML
INJECTION INTRAMUSCULAR; INTRAVENOUS
Status: COMPLETED
Start: 2019-10-24 | End: 2019-10-24

## 2019-10-24 RX ORDER — CLOPIDOGREL BISULFATE 75 MG/1
300 TABLET ORAL ONCE
Status: COMPLETED | OUTPATIENT
Start: 2019-10-25 | End: 2019-10-25

## 2019-10-24 RX ORDER — HEPARIN SODIUM,PORCINE 1000/ML
VIAL (ML) INJECTION
Status: COMPLETED
Start: 2019-10-24 | End: 2019-10-24

## 2019-10-24 RX ORDER — HEPARIN SODIUM 5000 [USP'U]/ML
5000 INJECTION, SOLUTION INTRAVENOUS; SUBCUTANEOUS EVERY 8 HOURS
Status: DISCONTINUED | OUTPATIENT
Start: 2019-10-24 | End: 2019-10-25

## 2019-10-24 RX ORDER — PRASUGREL 10 MG/1
TABLET, FILM COATED ORAL
Status: COMPLETED
Start: 2019-10-24 | End: 2019-10-24

## 2019-10-24 RX ORDER — METOPROLOL TARTRATE 1 MG/ML
INJECTION, SOLUTION INTRAVENOUS
Status: COMPLETED
Start: 2019-10-24 | End: 2019-10-24

## 2019-10-24 RX ORDER — MIDAZOLAM HYDROCHLORIDE 1 MG/ML
INJECTION INTRAMUSCULAR; INTRAVENOUS
Status: COMPLETED
Start: 2019-10-24 | End: 2019-10-24

## 2019-10-24 RX ORDER — PRASUGREL 10 MG/1
60 TABLET, FILM COATED ORAL ONCE
Status: DISCONTINUED | OUTPATIENT
Start: 2019-10-24 | End: 2019-10-24

## 2019-10-24 RX ORDER — HEPARIN SODIUM 200 [USP'U]/100ML
INJECTION, SOLUTION INTRAVENOUS
Status: COMPLETED
Start: 2019-10-24 | End: 2019-10-24

## 2019-10-24 RX ORDER — PRASUGREL 10 MG/1
10 TABLET, FILM COATED ORAL DAILY
Status: DISCONTINUED | OUTPATIENT
Start: 2019-10-25 | End: 2019-10-24

## 2019-10-24 RX ORDER — SODIUM CHLORIDE 9 MG/ML
INJECTION, SOLUTION INTRAVENOUS CONTINUOUS
Status: ACTIVE | OUTPATIENT
Start: 2019-10-24 | End: 2019-10-24

## 2019-10-24 RX ORDER — VERAPAMIL HYDROCHLORIDE 2.5 MG/ML
INJECTION, SOLUTION INTRAVENOUS
Status: COMPLETED
Start: 2019-10-24 | End: 2019-10-24

## 2019-10-24 RX ORDER — SODIUM CHLORIDE 9 MG/ML
INJECTION, SOLUTION INTRAVENOUS CONTINUOUS
Status: DISCONTINUED | OUTPATIENT
Start: 2019-10-24 | End: 2019-10-24

## 2019-10-24 RX ORDER — CLOPIDOGREL BISULFATE 75 MG/1
75 TABLET ORAL DAILY
Status: DISCONTINUED | OUTPATIENT
Start: 2019-10-26 | End: 2019-10-26 | Stop reason: HOSPADM

## 2019-10-24 RX ORDER — MORPHINE SULFATE 4 MG/ML
3 INJECTION, SOLUTION INTRAMUSCULAR; INTRAVENOUS ONCE
Status: COMPLETED | OUTPATIENT
Start: 2019-10-24 | End: 2019-10-24

## 2019-10-24 RX ORDER — LOSARTAN POTASSIUM 25 MG/1
25 TABLET ORAL
Status: DISCONTINUED | OUTPATIENT
Start: 2019-10-24 | End: 2019-10-26 | Stop reason: HOSPADM

## 2019-10-24 RX ADMIN — METOPROLOL TARTRATE 50 MG: 50 TABLET, FILM COATED ORAL at 04:39

## 2019-10-24 RX ADMIN — HEPARIN SODIUM: 1000 INJECTION INTRAVENOUS; SUBCUTANEOUS at 09:09

## 2019-10-24 RX ADMIN — INSULIN HUMAN 1 UNITS: 100 INJECTION, SOLUTION PARENTERAL at 21:51

## 2019-10-24 RX ADMIN — IOHEXOL 100 ML: 350 INJECTION, SOLUTION INTRAVENOUS at 10:31

## 2019-10-24 RX ADMIN — VERAPAMIL HYDROCHLORIDE 2.5 MG: 2.5 INJECTION INTRAVENOUS at 09:10

## 2019-10-24 RX ADMIN — MORPHINE SULFATE 3 MG: 4 INJECTION INTRAVENOUS at 10:30

## 2019-10-24 RX ADMIN — DIGOXIN 125 MCG: 125 TABLET ORAL at 17:49

## 2019-10-24 RX ADMIN — SENNOSIDES AND DOCUSATE SODIUM 2 TABLET: 8.6; 5 TABLET ORAL at 17:49

## 2019-10-24 RX ADMIN — LOSARTAN POTASSIUM 25 MG: 25 TABLET ORAL at 12:02

## 2019-10-24 RX ADMIN — LIDOCAINE HYDROCHLORIDE: 20 INJECTION, SOLUTION INFILTRATION; PERINEURAL at 09:09

## 2019-10-24 RX ADMIN — NITROGLYCERIN 1 INCH: 20 OINTMENT TOPICAL at 12:02

## 2019-10-24 RX ADMIN — FENTANYL CITRATE 100 MCG: 50 INJECTION INTRAMUSCULAR; INTRAVENOUS at 10:31

## 2019-10-24 RX ADMIN — NITROGLYCERIN 1 INCH: 20 OINTMENT TOPICAL at 17:49

## 2019-10-24 RX ADMIN — FUROSEMIDE 40 MG: 10 INJECTION, SOLUTION INTRAMUSCULAR; INTRAVENOUS at 09:49

## 2019-10-24 RX ADMIN — ATORVASTATIN CALCIUM 40 MG: 40 TABLET, FILM COATED ORAL at 17:49

## 2019-10-24 RX ADMIN — NITROGLYCERIN 10 ML: 20 INJECTION INTRAVENOUS at 09:10

## 2019-10-24 RX ADMIN — PRASUGREL 60 MG: 10 TABLET, FILM COATED ORAL at 10:29

## 2019-10-24 RX ADMIN — HEPARIN SODIUM 2000 UNITS: 200 INJECTION, SOLUTION INTRAVENOUS at 09:10

## 2019-10-24 RX ADMIN — METOPROLOL TARTRATE 50 MG: 50 TABLET, FILM COATED ORAL at 21:47

## 2019-10-24 RX ADMIN — MIDAZOLAM HYDROCHLORIDE 1 MG: 1 INJECTION, SOLUTION INTRAMUSCULAR; INTRAVENOUS at 10:30

## 2019-10-24 RX ADMIN — MIDAZOLAM HYDROCHLORIDE 2 MG: 1 INJECTION, SOLUTION INTRAMUSCULAR; INTRAVENOUS at 09:13

## 2019-10-24 RX ADMIN — METOPROLOL TARTRATE 5 MG: 5 INJECTION, SOLUTION INTRAVENOUS at 10:32

## 2019-10-24 RX ADMIN — ASPIRIN 81 MG 81 MG: 81 TABLET ORAL at 04:39

## 2019-10-24 RX ADMIN — HEPARIN SODIUM 4000 UNITS: 1000 INJECTION, SOLUTION INTRAVENOUS; SUBCUTANEOUS at 10:31

## 2019-10-24 RX ADMIN — HEPARIN SODIUM 5000 UNITS: 5000 INJECTION, SOLUTION INTRAVENOUS; SUBCUTANEOUS at 04:39

## 2019-10-24 RX ADMIN — METOPROLOL TARTRATE 50 MG: 50 TABLET, FILM COATED ORAL at 15:08

## 2019-10-24 ASSESSMENT — CHA2DS2 SCORE
HYPERTENSION: NO
CHA2DS2 VASC SCORE: 5
PRIOR STROKE OR TIA OR THROMBOEMBOLISM: YES
SEX: FEMALE
VASCULAR DISEASE: NO
AGE 75 OR GREATER: NO
DIABETES: YES
AGE 65 TO 74: NO
CHF OR LEFT VENTRICULAR DYSFUNCTION: YES

## 2019-10-24 ASSESSMENT — ENCOUNTER SYMPTOMS
FEVER: 0
COLOR CHANGE: 0
CHEST TIGHTNESS: 0
ABDOMINAL PAIN: 0
DIAPHORESIS: 0
AGITATION: 0
NUMBNESS: 0
DIARRHEA: 0
BLOOD IN STOOL: 0
COUGH: 0
TROUBLE SWALLOWING: 0
DIZZINESS: 0
ABDOMINAL DISTENTION: 0
DEPRESSION: 0
HEADACHES: 0
PALPITATIONS: 0
CHILLS: 0
NERVOUS/ANXIOUS: 0
SHORTNESS OF BREATH: 0
TINGLING: 0

## 2019-10-24 NOTE — OP REPORT
Cardiac catheterization report    Procedure date: 10/24/2019    Referring physician: Dr. Cesario Westfall    Pre-operative Diagnosis:  1.  New onset heart failure with severe re-reduced left ventricle systolic function unclear etiology  2.  Atrial fibrillation    Post-operative Diagnosis:   1.  Single-vessel coronary artery disease with 70% stenosis in mid left anterior descending artery (IFR 0.88) and 90% distal left anterior descending artery stenosis  2.  Status post stenting of the mid left anterior descending artery with 3 x 15 mm Xience Deepika drug eluting stent and 2.5 x 15 mm Xience Deepika drug eluting stent to the distal LAD  3.  Cardiomyopathy likely predominantly from nonischemic etiology, probably tachycardia induced  4.  Atrial fibrillation    Procedure:  1.  Left heart catheterization without left ventriculography  2.  Selective coronary angiography  3.  Instantaneous wave free ratio analysis of the left anterior descending artery and the left circumflex artery  4.  Percutaneous coronary intervention of the left anterior descending artery  5.  Supervision of moderate conscious sedation    Complications: None    Description of Procedure:  After informed consent was obtained, the patient was brought to cardiac catheterization laboratory in fasting state.   Chavez test was carried out on the right hand and was found to be negative.  Right wrist and right groin were then prepped and drapped in sterile fashion.  Versed and fentanyl were used for conscious sedation.  Lidocaine 2% was used to anesthetize the area.  A 6 Spanish Terumo sheath was then placed in the right radial artery using Seldinger technique.  A 2.5 mg of verapamil, 100 microgram of nitroglycerine and 3000 units of heparin were administered into the radial sheath.    Selective angiography of the right coronary artery was performed in multiple views using 5 Spanish TIG catheter.   Selective coronary angiography of the left coronary artery was  then performed using the same catheter.   The catheter was then advanced into the left ventricle.    Left ventricular pressure was then recorded.   Left heart pullback was then performed.    After reviewing diagnostic angiography, we decided to further interrogate the stenosis in the left circumflex (LCX) and left anterior descending artery (LAD) with instantaneous wave free ratio (IFR) analysis.  A 6 Faroese EBU 3.5 guide catheter was used.  Additional 5000 units of heparin was administered via the guide catheter.  After appropriate calibration, an IFR wire was advanced pass the stenosis into the distal portion of the LCX then LAD.  The iFR of the LCX was above 0.9 consistent with non-flow-limiting disease.  The IFR of the mid LAD lesion was however at 0.88 indicating flow-limiting stenosis.  She also had another 90% stenosis in the distal LAD, we therefore proceeded with percutaneous coronary intervention.  Both mid and distal LAD lesions were dilated with a 2.5x15 mm balloon.  A 2.5 x 15 mm and 3 x 15 mm Xience Deepika drug eluting stents were deployed in the distal and mid LAD respectively and post dilated with non-compliance balloon.  Subsequent angiography showed no significant residual stenosis with VAMSHI III flow.   The guide wire was subsequently removed and final angiography was performed.  It confirmed good result. The guide catheter was then removed.   The radial sheath was subsequently removed.  A Terumo TR wrist band was placed.  Small to moderate sized hematoma was noted on the ulnar aspect of forearm of both the puncture site.  Manual compression was applied followed by placement of pressure dressing.  Patient is no signs of reduced circulation in her right hand and denies any significant numbness on discomfort in the hand.    Pulse oximetry also continues to show normal waveform.    The patient was then given a loading dose of Effient.  The patient tolerated procedure well and left cardiac  catheterization laboratory in stable condition.  Of note additional 2000 units of heparin was also given during procedure due to ACT below optimal range.    I supervised monitoring the patient under moderate conscious sedation beginning at 9:03 AM until the end of the case at 10:26 AM.    Findings:  There is no gradient across aortic valve.  Left ventricular end-diastolic pressure was 29 mmHg.    Left ventriculography was not performed to minimize contrast load     Left systolic function is known to be severely depressed.    Single-vessel coronary artery disease    This is right dominant system.    Left main is large and without flow limiting disease.  It bifurcated into left anterior descending and left circumflex artery.     Left anterior descending artery is large caliber vessel and extends to the apex.  It gives rises to small first and medium sized second diagonal branches.  There was eccentric 70% stenosis in the mid portion of the left anterior descending artery (LAD) and 90% focal stenosis distally at the beginning of the case.  The antegrade flow is normal.    Left circumflex artery is large in caliber.   It gives rise to one small and one large obtuse marginal branches.  There is 50 to 60% stenosis in the mid portion of the left circumflex artery (LCX).  The IFR was consistent with non-flow-limiting disease.  The antegrade flow is normal.    Right coronary artery (RCA) is large caliber. It gives rise to 1  medium sized acute marginal branch, posterior descending artery and posterolateral branch.  There is no significant disease in the right coronary artery or its major branches.  The antegrade flow is normal.    After intervention, there was 10% residual stenosis in LAD with VAMSHI III flow.      Plans;  Continue Effient at least for one year.   Discontinue aspirin after 2 weeks if the patient will be placed on anticoagulation.  Risk factor modification.  Limit right wrist movement for 24 hours.  Optimize  medical therapy for systolic heart failure and rate control for atrial fibrillation                Anuel Marc M.D.

## 2019-10-24 NOTE — PROGRESS NOTES
Hospital Medicine Daily Progress Note    Date of Service: 10/21/2019 Code Status: Full Code     Chief Complaint  Chief Complaint   Patient presents with   • Atrial Fibrillation     pt was in afib RVR at outside facility in the 170's   • Sent by MD     transfer from [facility]       Consultants/Specialty: Cardiology, Neurology Disposition: Remain IP     Hospital Course     ER and Admission Day course  53 y.o. female with prior history of diabetes mellitus not currently on medication regimen, and recent cerebrovascular accident with now Broca's aphasia, was in her usual state of health until the day prior to admission.  She reports the onset of chest tightness, this was without exacerbating or alleviating factors and, and did not radiate anywhere.  She denies palpitations.  She has no other symptoms including shortness of breath, abdominal pain, nausea vomiting diarrhea or constipation.  No recent fever or chills       Interval Problem Update  JORGE-Cr has improved further.  A fib-TELE persistent with mild tachycardia.   Broca's aphasia-no recurrence. Hold AC for possible cardiac cath.     Went for cath, s/p KAYCE to LAD.     Family concerned about severe depression at home.       Review of Systems  Review of Systems   Constitutional: Negative for fever and malaise/fatigue.        F   HENT: Negative for tinnitus.    Respiratory: Negative for cough and shortness of breath.    Cardiovascular: Negative for chest pain, palpitations and leg swelling.   Gastrointestinal: Negative for diarrhea.   Skin: Negative for rash.   Neurological: Negative for tingling and headaches.   Psychiatric/Behavioral: Negative for depression. The patient is not nervous/anxious.    All other systems reviewed and are negative.   Physical Exam  Physical Exam   Constitutional: No distress.   Body mass index is 37.3 kg/m².     Cardiovascular:   IRIR   Pulmonary/Chest: No stridor. She has no rales.   Abdominal: There is no tenderness.    Musculoskeletal: She exhibits no edema.   Neurological: She is alert.   Skin: Skin is warm and dry. She is not diaphoretic.   Nursing note and vitals reviewed.  Unchanged today on 10/24/19     I/Os  No intake or output data in the 24 hours ending 10/24/19 1511 Vital Signs  Temp:  [35.9 °C (96.7 °F)-36.7 °C (98 °F)] 36.3 °C (97.4 °F)  Pulse:  [] 103  Resp:  [18] 18  BP: (117-145)/(68-80) 117/68  SpO2:  [93 %-98 %] 94 %     Laboratory           Medications    Scheduled medications:  nitroglycerin 1 Inch Topical TID   losartan 25 mg Oral Q DAY   [START ON 10/25/2019] clopidogrel 300 mg Oral Once   [START ON 10/26/2019] clopidogrel 75 mg Oral DAILY   heparin 5,000 Units Subcutaneous Q8HRS   metoprolol 50 mg Oral Q8HRS   digoxin 125 mcg Oral DAILY AT 1800   aspirin 81 mg Oral DAILY   atorvastatin 40 mg Oral Q EVENING   senna-docusate 2 Tab Oral BID   insulin regular 1-6 Units Subcutaneous 4X/DAY ACHS        Medications were reviewed today.      Assessment/Plan  * AF (atrial fibrillation) (HCC)- (present on admission)  Assessment & Plan  -HR is somewhat better  -start DOAC  -monitor on tele, replace Mg/K  -continue metoprolol 50 mg Q8- might consider increasing to metoprolol 100 mg BID  -digoxin 125 mcg daily    Acute combined systolic and diastolic heart failure (HCC)- (present on admission)  Assessment & Plan  Cath as outlined above  -remains without chest pain at this time    Cerebral infarction (HCC)- (present on admission)  Assessment & Plan  -remains stable, DOAC once cardiac cath done (or not done)-still holding at this time  -no new neurological symptoms  10/19 - Stroke protocol/order set placed, patient has had expressive aphasia symptoms for 3 days, not 3 weeks.  Has had dizziness/weird sensations for the last 3 days.  CT head showed Acute LEFT parietal infarct.  MR brain ordered by neurology, CT head here does show stable size from outside facility.    Broca's aphasia- (present on admission)  Assessment  & Plan  -doing well today    JORGE (acute kidney injury) (HCC)- (present on admission)  Assessment & Plan  -Acute on CKD II/III?  -Cr has improved to 1.49  -stop diuretics, defer cardiac cath and stopped aldactone and ACE  -Urine lytes  -renal US normal    Coronary artery disease involving native coronary artery of native heart without angina pectoris- (present on admission)  Assessment & Plan  -s/p KAYCE to the LAD    Bilateral carotid artery stenosis- (present on admission)  Assessment & Plan  10/21 - stable  10/20 - US carotid with <50% stenosis bilaterally    Smoker- (present on admission)  Assessment & Plan  10/21 - Stable.  10/20 - discussion of smoking cessation ~4 minutes about her smoking as a exacerbating factor to cause her stroke, patient amenable, interested in resources  10/19 - stable for now.    Class 2 obesity due to excess calories without serious comorbidity with body mass index (BMI) of 36.0 to 36.9 in adult- (present on admission)  Assessment & Plan  Body mass index is 36.21 kg/m².  10/19-10/21 - stable  10/18 - adm    Type 2 diabetes mellitus with hyperglycemia, without long-term current use of insulin (HCC)- (present on admission)  Assessment & Plan   - stable on current plan, no changes  -ISS, adjust PRN to keep BS less than 150       VTE prophylaxis: heparin

## 2019-10-24 NOTE — OR SURGEON
Immediate Post-Operative Note      PreOp Diagnosis:  New heart failure with severe cardiomyopathy of unknown etiology  AF    PostOp Diagnosis:   Single vessel CAD (70% mid and 90% distal LAD), non obstructive 50% mid LCX  S/p 3x15 and 2.5x15 mm Xience KAYCE to LAD  Cardiomyopathy predominantly nonischemic etiology    Procedure(s) :  Coronary Angiography, left heart catheterization  iFR LAD and LCX  PCI LAD    Surgeon(s):  Anuel Marc M.D.    Type of Anesthesia: Moderate Sedation    Specimen: None    Findings: As above    Complications: none      Anuel Marc M.D.  10/24/2019 10:25 AM

## 2019-10-24 NOTE — PROGRESS NOTES
Patient's Right arm swollen above TR Band.  Dr Marc notified and into see patient.  Pressure dressing placed on forearm per MD

## 2019-10-24 NOTE — PROGRESS NOTES
Bedside report received, Pt care assumed. Tele box on. VSS. Pt assessment complete. Pt AOX4, no signs of distress noted at this time.  Pt c/o of 0/10 pain. Pt denies any additional needs at this time. Bed in lowest position, pt ambulates independently. POC discussed with Pt/family, verbalizes understanding, no questions at this time. Pt educated on fall risk and verbalizes understanding, call light within reach, will continue to monitor.

## 2019-10-24 NOTE — PROGRESS NOTES
Cardiology Follow Up Progress Note    Date of Service  10/24/2019    Attending Physician  Vasquez Milan M.D.    Chief Complaint   Expressive aphasia    Cardiology consult   afib     HPI  Judith Vallecillo is a 53 y.o. female admitted 10/18/2019 with expressive aphasia. Transferred from [facility]. She was noted to have acute CVA (MRI showed acute area of infarction involving left posterior temporal,  and found to be afib. ECHO showed ef 20%    History of diabetes, cva and tobacco abuse     Interim Events  10/24/19: patient back from cath lab, got two KAYCE to LAD. Sitting in bed, feels tired but ok. Sister is at bedside    10/23/19: patient resting in bed, mild palpitations overnight. Denies any chest pain, SOB or dizziness. Overnight afib up to 154 nonsustained.     10/22/19: patient resting in a chair, denies any chest pain,sob or dizziness. Denies any palpitations. afib on the monitor rate 90-130s. On digoxin loading. Upset the cath was cancelled today, some residual of expressive aphasia. Siblings at the bedside.     Review of Systems  Review of Systems   Constitutional: Negative for chills, diaphoresis and fever.   HENT: Negative for nosebleeds and trouble swallowing.    Respiratory: Negative for cough, chest tightness and shortness of breath.    Cardiovascular: Negative for chest pain, palpitations and leg swelling.   Gastrointestinal: Negative for abdominal distention, abdominal pain and blood in stool.   Genitourinary: Negative for hematuria.   Skin: Negative for color change.   Neurological: Negative for dizziness, syncope and numbness.   Psychiatric/Behavioral: Negative for agitation.       Vital signs in last 24 hours  Temp:  [35.9 °C (96.7 °F)-36.6 °C (97.9 °F)] 35.9 °C (96.7 °F)  Pulse:  [] 66  Resp:  [16-18] 18  BP: (119-152)/(68-89) 131/80  SpO2:  [92 %-98 %] 97 %    Physical Exam  Physical Exam   Constitutional: She is oriented to person, place, and time. She appears well-developed and  well-nourished. No distress.   HENT:   Head: Normocephalic.   Neck: Normal range of motion. No JVD present.   Cardiovascular: Intact distal pulses. An irregularly irregular rhythm present. Tachycardia present.   Pulmonary/Chest: Effort normal. No respiratory distress.   Abdominal: She exhibits no distension. There is no tenderness.   Musculoskeletal: She exhibits no edema or tenderness.   Right wrist has TR band, adequate movement, sensation in right hand   Neurological: She is alert and oriented to person, place, and time.   Expressive aphasia    Skin: Skin is warm and dry. No rash noted. She is not diaphoretic.   Psychiatric: Her behavior is normal. Judgment normal.   Nursing note and vitals reviewed.      Lab Review  Lab Results   Component Value Date/Time    WBC 5.6 10/24/2019 03:49 AM    RBC 4.00 (L) 10/24/2019 03:49 AM    HEMOGLOBIN 12.0 10/24/2019 03:49 AM    HEMATOCRIT 37.8 10/24/2019 03:49 AM    MCV 94.5 10/24/2019 03:49 AM    MCH 30.0 10/24/2019 03:49 AM    MCHC 31.7 (L) 10/24/2019 03:49 AM    MPV 10.8 10/24/2019 03:49 AM      Lab Results   Component Value Date/Time    SODIUM 138 10/24/2019 03:49 AM    POTASSIUM 3.9 10/24/2019 03:49 AM    CHLORIDE 105 10/24/2019 03:49 AM    CO2 24 10/24/2019 03:49 AM    GLUCOSE 135 (H) 10/24/2019 03:49 AM    BUN 30 (H) 10/24/2019 03:49 AM    CREATININE 1.49 (H) 10/24/2019 03:49 AM      Lab Results   Component Value Date/Time    ASTSGOT 17 10/22/2019 11:24 AM    ALTSGPT 12 10/22/2019 11:24 AM     Lab Results   Component Value Date/Time    CHOLSTRLTOT 117 10/20/2019 02:59 AM    LDL 62 10/20/2019 02:59 AM    HDL 23 (A) 10/20/2019 02:59 AM    TRIGLYCERIDE 162 (H) 10/20/2019 02:59 AM    TROPONINT 22 (H) 10/19/2019 11:36 AM       No results for input(s): NTPROBNP in the last 72 hours.    Cardiac Imaging and Procedures Review  EKG:    ATRIAL FIBRILLATION, V-RATE     MULTIPLE VENTRICULAR PREMATURE COMPLEXES   PROBABLE LATERAL INFARCT, AGE INDETERMINATE   CONSIDER  ANTEROSEPTAL INFARCT   BORDERLINE T ABNORMALITIES, INFERIOR LEAD     Electronically Signed On 10- 22:24:31 PDT by RICHIE JEFF. AMD     Echocardiogram:    10/19/19  Four chamber dilation. Biventricular failure. LV systolic function is   severely reduced. LVEF 20% with global hypokinesis. No significant   valuvlar disease visualized on this study. RVSP estimated at 40 mmHg.   IVC is dilated and fails to collapse with inspiration suggesting   elevated central venous pressures. Compared to the images of the study   done on 06/01/2011 there has been a severe decline in biventricular   Function.    Cardiac Catheterization:   10/24/19  PostOp Diagnosis:   Single vessel CAD (70% mid and 90% distal LAD), non obstructive 50% mid LCX  S/p 3x15 and 2.5x15 mm Xience KAYCE to LAD  Cardiomyopathy predominantly nonischemic etiology    MRI brain  10/19/19  1.  Moderate size acute area of infarction involving the left posterior temporal, left parietal-occipital, and lateral occipital lobes.    2.  Small chronic area of lacunar infarction involving the left thalamus.    3.  Minimal periventricular, juxtacortical, and pontine white matter changes consistent with chronic microvascular ischemic gliosis    Carotid Duplex  10/19/19   Mild bilateral internal carotid artery stenosis (<50%).    Thyroid nodules measuring less than 1 cm.    Assessment/Plan  1. Atrial fibrillation:  - rate 90-130s  - continue digoxin 125 mcg per renal dosage   - continue metoprolol 50mg TID for rate control   - oral anticoagulation can start tomorrow     Acute biventricular heart failure  Ischemic Cardiomyopathy  CAD s/p KAYCE to LAD  - ECHO showed four chamber dilation   - cath showed 70% and 90% distal LAD and nonobstructive 50% mid LCX, got 2 KAYCE to LAD.   - DAPT: aspirin and likely plavix (effient contraindicated due to stroke)  - continue bb therapy   - start low dose ARB today, continue to hold aldactone due to JORGE    3. Acute CVA;  - continue asa  and statin therapy     4. JORGE:  - creatinine trending improving 1.49    Thank you for allowing me to participate in the care of this patient.  I will continue to follow this patient  Staffed with Dr. Westfall

## 2019-10-25 ENCOUNTER — PATIENT OUTREACH (OUTPATIENT)
Dept: HEALTH INFORMATION MANAGEMENT | Facility: OTHER | Age: 54
End: 2019-10-25

## 2019-10-25 LAB
ANION GAP SERPL CALC-SCNC: 9 MMOL/L (ref 0–11.9)
BASOPHILS # BLD AUTO: 0.6 % (ref 0–1.8)
BASOPHILS # BLD: 0.03 K/UL (ref 0–0.12)
BUN SERPL-MCNC: 26 MG/DL (ref 8–22)
CALCIUM SERPL-MCNC: 9.1 MG/DL (ref 8.5–10.5)
CHLORIDE SERPL-SCNC: 102 MMOL/L (ref 96–112)
CO2 SERPL-SCNC: 26 MMOL/L (ref 20–33)
CREAT SERPL-MCNC: 1.52 MG/DL (ref 0.5–1.4)
EOSINOPHIL # BLD AUTO: 0.11 K/UL (ref 0–0.51)
EOSINOPHIL NFR BLD: 2.1 % (ref 0–6.9)
ERYTHROCYTE [DISTWIDTH] IN BLOOD BY AUTOMATED COUNT: 51.1 FL (ref 35.9–50)
GLUCOSE BLD-MCNC: 117 MG/DL (ref 65–99)
GLUCOSE BLD-MCNC: 118 MG/DL (ref 65–99)
GLUCOSE BLD-MCNC: 178 MG/DL (ref 65–99)
GLUCOSE BLD-MCNC: 221 MG/DL (ref 65–99)
GLUCOSE SERPL-MCNC: 138 MG/DL (ref 65–99)
HCT VFR BLD AUTO: 37.9 % (ref 37–47)
HGB BLD-MCNC: 11.9 G/DL (ref 12–16)
IMM GRANULOCYTES # BLD AUTO: 0.02 K/UL (ref 0–0.11)
IMM GRANULOCYTES NFR BLD AUTO: 0.4 % (ref 0–0.9)
LYMPHOCYTES # BLD AUTO: 0.82 K/UL (ref 1–4.8)
LYMPHOCYTES NFR BLD: 15.7 % (ref 22–41)
MAGNESIUM SERPL-MCNC: 1.7 MG/DL (ref 1.5–2.5)
MCH RBC QN AUTO: 29.6 PG (ref 27–33)
MCHC RBC AUTO-ENTMCNC: 31.4 G/DL (ref 33.6–35)
MCV RBC AUTO: 94.3 FL (ref 81.4–97.8)
MONOCYTES # BLD AUTO: 0.66 K/UL (ref 0–0.85)
MONOCYTES NFR BLD AUTO: 12.7 % (ref 0–13.4)
NEUTROPHILS # BLD AUTO: 3.57 K/UL (ref 2–7.15)
NEUTROPHILS NFR BLD: 68.5 % (ref 44–72)
NRBC # BLD AUTO: 0 K/UL
NRBC BLD-RTO: 0 /100 WBC
PLATELET # BLD AUTO: 254 K/UL (ref 164–446)
PMV BLD AUTO: 10.3 FL (ref 9–12.9)
POTASSIUM SERPL-SCNC: 3.6 MMOL/L (ref 3.6–5.5)
RBC # BLD AUTO: 4.02 M/UL (ref 4.2–5.4)
SODIUM SERPL-SCNC: 137 MMOL/L (ref 135–145)
WBC # BLD AUTO: 5.2 K/UL (ref 4.8–10.8)

## 2019-10-25 PROCEDURE — 97535 SELF CARE MNGMENT TRAINING: CPT

## 2019-10-25 PROCEDURE — 770020 HCHG ROOM/CARE - TELE (206)

## 2019-10-25 PROCEDURE — 36415 COLL VENOUS BLD VENIPUNCTURE: CPT

## 2019-10-25 PROCEDURE — 85025 COMPLETE CBC W/AUTO DIFF WBC: CPT

## 2019-10-25 PROCEDURE — A9270 NON-COVERED ITEM OR SERVICE: HCPCS | Performed by: STUDENT IN AN ORGANIZED HEALTH CARE EDUCATION/TRAINING PROGRAM

## 2019-10-25 PROCEDURE — A9270 NON-COVERED ITEM OR SERVICE: HCPCS | Performed by: INTERNAL MEDICINE

## 2019-10-25 PROCEDURE — 99232 SBSQ HOSP IP/OBS MODERATE 35: CPT | Performed by: INTERNAL MEDICINE

## 2019-10-25 PROCEDURE — 700102 HCHG RX REV CODE 250 W/ 637 OVERRIDE(OP): Performed by: STUDENT IN AN ORGANIZED HEALTH CARE EDUCATION/TRAINING PROGRAM

## 2019-10-25 PROCEDURE — A9270 NON-COVERED ITEM OR SERVICE: HCPCS | Performed by: PHYSICIAN ASSISTANT

## 2019-10-25 PROCEDURE — 99233 SBSQ HOSP IP/OBS HIGH 50: CPT | Performed by: INTERNAL MEDICINE

## 2019-10-25 PROCEDURE — 700102 HCHG RX REV CODE 250 W/ 637 OVERRIDE(OP): Performed by: INTERNAL MEDICINE

## 2019-10-25 PROCEDURE — A9270 NON-COVERED ITEM OR SERVICE: HCPCS | Performed by: NURSE PRACTITIONER

## 2019-10-25 PROCEDURE — 700102 HCHG RX REV CODE 250 W/ 637 OVERRIDE(OP): Performed by: NURSE PRACTITIONER

## 2019-10-25 PROCEDURE — 82962 GLUCOSE BLOOD TEST: CPT

## 2019-10-25 PROCEDURE — 83735 ASSAY OF MAGNESIUM: CPT

## 2019-10-25 PROCEDURE — 80048 BASIC METABOLIC PNL TOTAL CA: CPT

## 2019-10-25 PROCEDURE — 700102 HCHG RX REV CODE 250 W/ 637 OVERRIDE(OP): Performed by: PHYSICIAN ASSISTANT

## 2019-10-25 RX ORDER — DIGOXIN 125 MCG
125 TABLET ORAL DAILY
Status: DISCONTINUED | OUTPATIENT
Start: 2019-10-25 | End: 2019-10-25

## 2019-10-25 RX ORDER — METOPROLOL SUCCINATE 50 MG/1
150 TABLET, EXTENDED RELEASE ORAL
Status: DISCONTINUED | OUTPATIENT
Start: 2019-10-25 | End: 2019-10-26

## 2019-10-25 RX ORDER — DEXTROSE MONOHYDRATE 50 MG/ML
INJECTION, SOLUTION INTRAVENOUS CONTINUOUS
Status: DISCONTINUED | OUTPATIENT
Start: 2019-10-25 | End: 2019-10-25

## 2019-10-25 RX ORDER — SPIRONOLACTONE 25 MG/1
12.5 TABLET ORAL
Status: DISCONTINUED | OUTPATIENT
Start: 2019-10-25 | End: 2019-10-26 | Stop reason: HOSPADM

## 2019-10-25 RX ADMIN — INSULIN HUMAN 2 UNITS: 100 INJECTION, SOLUTION PARENTERAL at 12:25

## 2019-10-25 RX ADMIN — APIXABAN 5 MG: 5 TABLET, FILM COATED ORAL at 08:41

## 2019-10-25 RX ADMIN — NITROGLYCERIN 1 INCH: 20 OINTMENT TOPICAL at 05:39

## 2019-10-25 RX ADMIN — INSULIN HUMAN 1 UNITS: 100 INJECTION, SOLUTION PARENTERAL at 21:17

## 2019-10-25 RX ADMIN — ASPIRIN 81 MG 81 MG: 81 TABLET ORAL at 05:39

## 2019-10-25 RX ADMIN — CLOPIDOGREL BISULFATE 300 MG: 75 TABLET ORAL at 05:38

## 2019-10-25 RX ADMIN — METOPROLOL TARTRATE 50 MG: 50 TABLET, FILM COATED ORAL at 05:39

## 2019-10-25 RX ADMIN — ATORVASTATIN CALCIUM 40 MG: 40 TABLET, FILM COATED ORAL at 17:26

## 2019-10-25 RX ADMIN — SPIRONOLACTONE 12.5 MG: 25 TABLET ORAL at 13:32

## 2019-10-25 RX ADMIN — APIXABAN 5 MG: 5 TABLET, FILM COATED ORAL at 17:26

## 2019-10-25 RX ADMIN — LOSARTAN POTASSIUM 25 MG: 25 TABLET ORAL at 05:39

## 2019-10-25 RX ADMIN — METOPROLOL SUCCINATE 150 MG: 50 TABLET, FILM COATED, EXTENDED RELEASE ORAL at 13:32

## 2019-10-25 ASSESSMENT — ENCOUNTER SYMPTOMS
CHEST TIGHTNESS: 0
ABDOMINAL DISTENTION: 0
FATIGUE: 0
DIZZINESS: 0
NERVOUS/ANXIOUS: 0
TINGLING: 0
UNEXPECTED WEIGHT CHANGE: 0
FEVER: 0
SHORTNESS OF BREATH: 0
LIGHT-HEADEDNESS: 0
PALPITATIONS: 0
ABDOMINAL PAIN: 0
BRUISES/BLEEDS EASILY: 0
DEPRESSION: 1
DIAPHORESIS: 0
BLOOD IN STOOL: 0
HEADACHES: 0
COUGH: 0

## 2019-10-25 ASSESSMENT — COGNITIVE AND FUNCTIONAL STATUS - GENERAL
DAILY ACTIVITIY SCORE: 24
SUGGESTED CMS G CODE MODIFIER MOBILITY: CH
MOBILITY SCORE: 24
SUGGESTED CMS G CODE MODIFIER DAILY ACTIVITY: CH

## 2019-10-25 ASSESSMENT — GAIT ASSESSMENTS
DEVIATION: BRADYKINETIC
GAIT LEVEL OF ASSIST: SUPERVISED
DISTANCE (FEET): 250

## 2019-10-25 ASSESSMENT — ACTIVITIES OF DAILY LIVING (ADL): TOILETING: INDEPENDENT

## 2019-10-25 NOTE — THERAPY
"Pt seen following cardiac cath for PT cardiac rehab phase I education. Pt very receptive to education. Pt reports not having a formal walking program, does care for 2 horses and dogs. Pt tolerated ambulation x250 ft w/o symptoms of failure. Discussed \"talk test\"/self pacing, goals for walking program upon DC home, and cardiac rehab phase II follow up. Pt verbalized understanding and noted no further questions. PT will remain available for DC needs or should questions arise.     Physical Therapy Treatment completed.   Bed Mobility:  Supine to Sit: Supervised  Transfers: Sit to Stand: Supervised  Gait: Level Of Assist: Supervised with No Equipment Needed       Plan of Care: Patient with no further skilled PT needs in the acute care setting at this time  Discharge Recommendations: Equipment: No Equipment Needed. Post-acute therapy: Recommend home with outpatient cardiac rehab, phase II as able.     See \"Rehab Therapy-Acute\" Patient Summary Report for complete documentation.       "

## 2019-10-25 NOTE — THERAPY
"Occupational Therapy Educational Screening completed.   Functional Status:  SPV level BADLs in this setting  Plan of Care: Patient with no further skilled OT needs in the acute care setting at this time  Discharge Recommendations:  Equipment: No Equipment Needed. Post-acute therapy Currently anticipate no further skilled therapy needs once patient is discharged from the inpatient setting.    See \"Rehab Therapy-Acute\" Patient Summary Report for complete documentation.      Pt is close to functional baseline, continues to perform BADLs at SPV level. Ed/trained pt on edema reduction techniques for R UE. Recommend DC home. No further Acute OT needs noted at this time.   "

## 2019-10-25 NOTE — DISCHARGE PLANNING
Anticipated Discharge Disposition: Home    Action: CM called [facility] in [place] to check the cost of Eliquis and they state that they don't have pt's prescription coverage card on file.   CM met with pt at bedside and obtained her insurance card and made a copy.   CM sent updated info to [facility] and will call later once they have time to run prescription through insurance.     Barriers to Discharge:     Plan: Call [facility] to check cost of Eliquis once they have time to run through insurance.

## 2019-10-25 NOTE — PROGRESS NOTES
Received bedside report from Day FABIOLA Feldman. Patient is in bed, complaints of pain on right arm due to coban on there from IR, removed coban dressing and instructed patient to keep arm elevated. Family bedside with patient.        Family Request:  Family concerned about depression for patient. Family member also changed flight to help care for patient and requesting a letter from health care provider stating she stayed to help care for patient and needed to change her flight.

## 2019-10-25 NOTE — PROGRESS NOTES
Hospital Medicine Daily Progress Note    Date of Service: 10/21/2019 Code Status: Full Code     Chief Complaint  Chief Complaint   Patient presents with   • Atrial Fibrillation     pt was in afib RVR at outside facility in the 170's   • Sent by MD     transfer [facility]       Consultants/Specialty: Cardiology, Neurology Disposition: Remain IP     Hospital Course     ER and Admission Day course  53 y.o. female with prior history of diabetes mellitus not currently on medication regimen, and recent cerebrovascular accident with now Broca's aphasia, was in her usual state of health until the day prior to admission.  She reports the onset of chest tightness, this was without exacerbating or alleviating factors and, and did not radiate anywhere.  She denies palpitations.  She has no other symptoms including shortness of breath, abdominal pain, nausea vomiting diarrhea or constipation.  No recent fever or chills       Interval Problem Update  JORGE-Cr has stabilized. Urinating well.    A fib-TELE persistent with mild tachycardia, no clear changes today. Going for DCCV tomorrow.   Broca's aphasia-no recurrence. Hold AC for possible cardiac cath.     Tried talking to patient about depression, not very receptive to it.       Review of Systems  Review of Systems   Constitutional: Negative for fever and malaise/fatigue.        Remains about the same   HENT: Negative for hearing loss.    Respiratory: Negative for cough and shortness of breath.    Cardiovascular: Negative for chest pain and leg swelling.   Gastrointestinal: Negative for abdominal pain.   Skin: Negative for rash.   Neurological: Negative for tingling and headaches.   Psychiatric/Behavioral: Positive for depression. The patient is not nervous/anxious.    All other systems reviewed and are negative.   Physical Exam  Physical Exam   Constitutional:   Body mass index is 37.3 kg/m².     Cardiovascular:   IRIR   Pulmonary/Chest: No stridor. No respiratory  distress. She has no wheezes.   Abdominal: She exhibits no distension.   Musculoskeletal: She exhibits no edema.   Neurological: She is alert.   Skin: Skin is warm and dry. She is not diaphoretic.   Psychiatric:   tearful   Nursing note and vitals reviewed.       I/Os    Intake/Output Summary (Last 24 hours) at 10/25/2019 1340  Last data filed at 10/24/2019 2226  Gross per 24 hour   Intake 100 ml   Output --   Net 100 ml    Vital Signs  Temp:  [36 °C (96.8 °F)-36.6 °C (97.8 °F)] 36.3 °C (97.4 °F)  Pulse:  [] 64  Resp:  [16-18] 18  BP: (115-136)/(64-80) 126/69  SpO2:  [93 %-97 %] 96 %     Laboratory           Medications    Scheduled medications:  metoprolol  mg Oral Q DAY   apixaban 5 mg Oral BID   digoxin 125 mcg Oral DAILY AT 1800   spironolactone 12.5 mg Oral Q DAY   losartan 25 mg Oral Q DAY   [START ON 10/26/2019] clopidogrel 75 mg Oral DAILY   aspirin 81 mg Oral DAILY   atorvastatin 40 mg Oral Q EVENING   senna-docusate 2 Tab Oral BID   insulin regular 1-6 Units Subcutaneous 4X/DAY ACHS        Medications were reviewed today.      Assessment/Plan  * AF (atrial fibrillation) (HCC)- (present on admission)  Assessment & Plan  -HR is somewhat better but remains tachycardic, DCCV tomorrow  -start DOAC  -monitor on tele, replace Mg/K  -changed to metoprolol  mg daily  -digoxin 125 mcg daily    Acute combined systolic and diastolic heart failure (HCC)- (present on admission)  Assessment & Plan  Cath as outlined above  -remains without chest pain at this time  -continue aldactone low dose losratan  Continue toprol XL  -doing well  -digoxin    Cerebral infarction (HCC)- (present on admission)  Assessment & Plan  -remains stable, DOAC once cardiac cath done (or not done)-still holding at this time  -no new neurological symptoms  10/19 - Stroke protocol/order set placed, patient has had expressive aphasia symptoms for 3 days, not 3 weeks.  Has had dizziness/weird sensations for the last 3 days.  CT head  showed Acute LEFT parietal infarct.  MR brain ordered by neurology, CT head here does show stable size from outside facility.    Broca's aphasia- (present on admission)  Assessment & Plan  -doing well today    JORGE (acute kidney injury) (HCC)- (present on admission)  Assessment & Plan  -Acute on CKD II/III?  -Cr has  Stabilized around 1.5, likely her baseline?  -monitor  -Urine lytes  -renal US normal    Coronary artery disease involving native coronary artery of native heart without angina pectoris- (present on admission)  Assessment & Plan  -s/p KAYCE to the LAD    Bilateral carotid artery stenosis- (present on admission)  Assessment & Plan  10/21 - stable  10/20 - US carotid with <50% stenosis bilaterally    Smoker- (present on admission)  Assessment & Plan  10/21 - Stable.  10/20 - discussion of smoking cessation ~4 minutes about her smoking as a exacerbating factor to cause her stroke, patient amenable, interested in resources  10/19 - stable for now.    Class 2 obesity due to excess calories without serious comorbidity with body mass index (BMI) of 36.0 to 36.9 in adult- (present on admission)  Assessment & Plan  Body mass index is 36.21 kg/m².  10/19-10/21 - stable  10/18 - adm    Type 2 diabetes mellitus with hyperglycemia, without long-term current use of insulin (HCC)- (present on admission)  Assessment & Plan   - stable on current plan, no changes  -ISS, adjust PRN to keep BS less than 150       VTE prophylaxis: heparin

## 2019-10-25 NOTE — PROGRESS NOTES
Telemetry Shift Summary     Rhythm: A Fib  HR Range:  (Occasionally tach to 200, trisha to 45- non sustained)  Ectopy: Occasional PVC, Rare Couplets  Measurements: -/0.10/-           Normal Values  Rhythm SR  HR Range    Measurements 0.12-0.20 / 0.06-0.10  / 0.30-0.52    12 Hr CC

## 2019-10-25 NOTE — CARE PLAN
Problem: Nutritional:  Goal: Achieve adequate nutritional intake  Description  Diet > NPO and patient will consume 50% of meals.   Outcome: MET     Pt reports adequate PO of > 50% on cardiac diet. RD to follow per department guidelines.

## 2019-10-25 NOTE — PROGRESS NOTES
Cardiology Follow Up Progress Note    Date of Service  10/25/2019    Attending Physician  Vasquez Milan M.D.    Chief Complaint   Expressive aphasia    Cardiology consult   afib     HPI  Judith Vallecillo is a 53 y.o. female admitted 10/18/2019 with expressive aphasia. Transferred from [facility]. She was noted to have acute CVA (MRI showed acute area of infarction involving left posterior temporal,  and found to be afib. ECHO showed ef 20%    History of diabetes, cva and tobacco use.    Interim Events  Feeling well this morning.  Denies any palpitations, dizziness, shortness of breath.  She does not have any swelling around her ankles or abdominal bloating.    Her brother and sister at bedside.  We reviewed her new diagnosis of heart failure atrial fibrillation and coronary artery disease.  We reviewed the new medications the importance of taking dual antiplatelet therapy as well as her oral anticoagulation.    She lives in [place] and is a .  She would like to continue to follow-up here in [place].    Afib on monitor overnight, rates 90-110s.    BP well controlled 110s-130s.     Review of Systems  Review of Systems   Constitutional: Negative for diaphoresis, fatigue, fever and unexpected weight change.   Respiratory: Negative for cough, chest tightness and shortness of breath.    Cardiovascular: Negative for chest pain, palpitations and leg swelling.   Gastrointestinal: Negative for abdominal distention and blood in stool.   Genitourinary: Negative for decreased urine volume.   Musculoskeletal: Negative for gait problem.   Neurological: Negative for dizziness and light-headedness.   Hematological: Does not bruise/bleed easily.       Vital signs in last 24 hours  Temp:  [35.9 °C (96.7 °F)-36.7 °C (98 °F)] 36.3 °C (97.3 °F)  Pulse:  [] 110  Resp:  [16-18] 16  BP: (110-145)/(64-80) 136/71  SpO2:  [93 %-97 %] 95 %    Physical Exam  Physical Exam   Constitutional: She is oriented to person,  place, and time. She appears well-developed and well-nourished. No distress.   HENT:   Head: Normocephalic.   Neck: Normal range of motion. No JVD present.   Cardiovascular: Intact distal pulses. An irregularly irregular rhythm present. Tachycardia present.   Pulmonary/Chest: Effort normal. No respiratory distress.   Abdominal: She exhibits no distension. There is no tenderness.   Musculoskeletal: She exhibits no edema or tenderness.   Right wrist has TR band, adequate movement, sensation in right hand   Neurological: She is alert and oriented to person, place, and time.   Expressive aphasia    Skin: Skin is warm and dry. No rash noted. She is not diaphoretic.   Psychiatric: Her behavior is normal. Judgment normal.   Nursing note and vitals reviewed.      Lab Review  Lab Results   Component Value Date/Time    WBC 5.2 10/25/2019 03:12 AM    RBC 4.02 (L) 10/25/2019 03:12 AM    HEMOGLOBIN 11.9 (L) 10/25/2019 03:12 AM    HEMATOCRIT 37.9 10/25/2019 03:12 AM    MCV 94.3 10/25/2019 03:12 AM    MCH 29.6 10/25/2019 03:12 AM    MCHC 31.4 (L) 10/25/2019 03:12 AM    MPV 10.3 10/25/2019 03:12 AM      Lab Results   Component Value Date/Time    SODIUM 137 10/25/2019 03:12 AM    POTASSIUM 3.6 10/25/2019 03:12 AM    CHLORIDE 102 10/25/2019 03:12 AM    CO2 26 10/25/2019 03:12 AM    GLUCOSE 138 (H) 10/25/2019 03:12 AM    BUN 26 (H) 10/25/2019 03:12 AM    CREATININE 1.52 (H) 10/25/2019 03:12 AM      Lab Results   Component Value Date/Time    ASTSGOT 17 10/22/2019 11:24 AM    ALTSGPT 12 10/22/2019 11:24 AM     Lab Results   Component Value Date/Time    CHOLSTRLTOT 117 10/20/2019 02:59 AM    LDL 62 10/20/2019 02:59 AM    HDL 23 (A) 10/20/2019 02:59 AM    TRIGLYCERIDE 162 (H) 10/20/2019 02:59 AM    TROPONINT 22 (H) 10/19/2019 11:36 AM       No results for input(s): NTPROBNP in the last 72 hours.    Cardiac Imaging and Procedures Review  Echocardiogram:    10/19/19  Four chamber dilation. Biventricular failure. LV systolic function is    severely reduced. LVEF 20% with global hypokinesis. No significant   valuvlar disease visualized on this study. RVSP estimated at 40 mmHg.   IVC is dilated and fails to collapse with inspiration suggesting   elevated central venous pressures. Compared to the images of the study   done on 06/01/2011 there has been a severe decline in biventricular   Function.    Cardiac Catheterization:   10/24/19  PostOp Diagnosis:   Single vessel CAD (70% mid and 90% distal LAD), non obstructive 50% mid LCX  S/p 3x15 and 2.5x15 mm Xience KAYCE to LAD  Cardiomyopathy predominantly nonischemic etiology    MRI brain  10/19/19  1.  Moderate size acute area of infarction involving the left posterior temporal, left parietal-occipital, and lateral occipital lobes.    2.  Small chronic area of lacunar infarction involving the left thalamus.    3.  Minimal periventricular, juxtacortical, and pontine white matter changes consistent with chronic microvascular ischemic gliosis    Carotid Duplex  10/19/19   Mild bilateral internal carotid artery stenosis (<50%).    Thyroid nodules measuring less than 1 cm.    Assessment/Plan  Atrial fibrillation:  - [id number]  - continue digoxin 125 mcg per renal dosage   - continue metoprolol XL 150mg  for rate control   - eliquis 5mg bid  -Given her LV dysfunction will try to perform SONIA and cardioversion tomorrow.    Acute biventricular heart failure  Suspect tachycardia induced cardiomyopathy versus ischemic cardiomyopathy  -LVEF 20%, severely dilated LV  - Metoprolol XL 150mg   - Losartan 25mg   - start yulissa 12.5mg today, monitor her kidney function    Ischemic Cardiomyopathy  CAD s/p KAYCE to LAD  - cath showed 70% and 90% distal LAD and nonobstructive 50% mid LCX, got 2 KAYCE to LAD.   - DAPT: aspirin and changed to plavix (effient contraindicated due to stroke) for 30 days then stop aspirin, continue plavix and OAC  - continue bb therapy      Acute left MCA ischemic stroke 2/2 AF  - continue asa and  statin therapy      JORGE, improving on CKD stage 3  - careful titration of HF meds    Thank you for allowing me to participate in the care of this patient.  I will continue to follow this patient  Staffed with Dr. Westfall

## 2019-10-25 NOTE — THERAPY
Occupational Therapy Contact Note:    Attempted to see pt for OT eval. Pt getting hearth cath today. Will round back post op.     Hillary Markham,  Pager: [phone]

## 2019-10-25 NOTE — DISCHARGE PLANNING
CM received call back from [facility] and they report that the Eliquis will require a prior auth. The pharmacist checked pts plan and told this CM that none of the DOAC's are covered and they each would require a prior auth.   CM updated Dr. Milan.

## 2019-10-25 NOTE — CARE PLAN
Problem: Communication  Goal: The ability to communicate needs accurately and effectively will improve  Outcome: PROGRESSING AS EXPECTED     Problem: Safety  Goal: Will remain free from injury  Outcome: PROGRESSING AS EXPECTED  Goal: Will remain free from falls  Outcome: PROGRESSING AS EXPECTED     Problem: Infection  Goal: Will remain free from infection  Outcome: PROGRESSING AS EXPECTED     Problem: Venous Thromboembolism (VTW)/Deep Vein Thrombosis (DVT) Prevention:  Goal: Patient will participate in Venous Thrombosis (VTE)/Deep Vein Thrombosis (DVT)Prevention Measures  Outcome: PROGRESSING AS EXPECTED     Problem: Bowel/Gastric:  Goal: Normal bowel function is maintained or improved  Outcome: PROGRESSING AS EXPECTED  Goal: Will not experience complications related to bowel motility  Outcome: PROGRESSING AS EXPECTED     Problem: Knowledge Deficit  Goal: Knowledge of disease process/condition, treatment plan, diagnostic tests, and medications will improve  Outcome: PROGRESSING AS EXPECTED  Goal: Knowledge of the prescribed therapeutic regimen will improve  Outcome: PROGRESSING AS EXPECTED     Problem: Discharge Barriers/Planning  Goal: Patient's continuum of care needs will be met  Outcome: PROGRESSING AS EXPECTED     Problem: Fluid Volume:  Goal: Will maintain balanced intake and output  Outcome: PROGRESSING AS EXPECTED     Problem: Respiratory:  Goal: Respiratory status will improve  Outcome: PROGRESSING AS EXPECTED     Problem: Pain Management  Goal: Pain level will decrease to patient's comfort goal  Outcome: PROGRESSING AS EXPECTED

## 2019-10-26 ENCOUNTER — APPOINTMENT (OUTPATIENT)
Dept: CARDIOLOGY | Facility: MEDICAL CENTER | Age: 54
DRG: 981 | End: 2019-10-26
Attending: PHYSICIAN ASSISTANT
Payer: COMMERCIAL

## 2019-10-26 VITALS
HEART RATE: 79 BPM | OXYGEN SATURATION: 94 % | WEIGHT: 197.31 LBS | SYSTOLIC BLOOD PRESSURE: 140 MMHG | BODY MASS INDEX: 36.31 KG/M2 | RESPIRATION RATE: 16 BRPM | HEIGHT: 62 IN | TEMPERATURE: 97.2 F | DIASTOLIC BLOOD PRESSURE: 86 MMHG

## 2019-10-26 LAB
ANION GAP SERPL CALC-SCNC: 11 MMOL/L (ref 0–11.9)
BUN SERPL-MCNC: 20 MG/DL (ref 8–22)
CALCIUM SERPL-MCNC: 9.3 MG/DL (ref 8.5–10.5)
CHLORIDE SERPL-SCNC: 103 MMOL/L (ref 96–112)
CO2 SERPL-SCNC: 23 MMOL/L (ref 20–33)
CREAT SERPL-MCNC: 1.4 MG/DL (ref 0.5–1.4)
GLUCOSE BLD-MCNC: 133 MG/DL (ref 65–99)
GLUCOSE SERPL-MCNC: 133 MG/DL (ref 65–99)
MAGNESIUM SERPL-MCNC: 1.8 MG/DL (ref 1.5–2.5)
POTASSIUM SERPL-SCNC: 3.9 MMOL/L (ref 3.6–5.5)
SODIUM SERPL-SCNC: 137 MMOL/L (ref 135–145)

## 2019-10-26 PROCEDURE — 700102 HCHG RX REV CODE 250 W/ 637 OVERRIDE(OP): Performed by: STUDENT IN AN ORGANIZED HEALTH CARE EDUCATION/TRAINING PROGRAM

## 2019-10-26 PROCEDURE — A9270 NON-COVERED ITEM OR SERVICE: HCPCS | Performed by: NURSE PRACTITIONER

## 2019-10-26 PROCEDURE — 700102 HCHG RX REV CODE 250 W/ 637 OVERRIDE(OP): Performed by: INTERNAL MEDICINE

## 2019-10-26 PROCEDURE — 93312 ECHO TRANSESOPHAGEAL: CPT | Mod: 26 | Performed by: INTERNAL MEDICINE

## 2019-10-26 PROCEDURE — 82962 GLUCOSE BLOOD TEST: CPT | Mod: 91

## 2019-10-26 PROCEDURE — 93325 DOPPLER ECHO COLOR FLOW MAPG: CPT

## 2019-10-26 PROCEDURE — 36415 COLL VENOUS BLD VENIPUNCTURE: CPT

## 2019-10-26 PROCEDURE — 99233 SBSQ HOSP IP/OBS HIGH 50: CPT | Performed by: INTERNAL MEDICINE

## 2019-10-26 PROCEDURE — 700102 HCHG RX REV CODE 250 W/ 637 OVERRIDE(OP): Performed by: NURSE PRACTITIONER

## 2019-10-26 PROCEDURE — 700102 HCHG RX REV CODE 250 W/ 637 OVERRIDE(OP): Performed by: PHYSICIAN ASSISTANT

## 2019-10-26 PROCEDURE — A9270 NON-COVERED ITEM OR SERVICE: HCPCS | Performed by: PHYSICIAN ASSISTANT

## 2019-10-26 PROCEDURE — A9270 NON-COVERED ITEM OR SERVICE: HCPCS | Performed by: STUDENT IN AN ORGANIZED HEALTH CARE EDUCATION/TRAINING PROGRAM

## 2019-10-26 PROCEDURE — A9270 NON-COVERED ITEM OR SERVICE: HCPCS | Performed by: HOSPITALIST

## 2019-10-26 PROCEDURE — 700117 HCHG RX CONTRAST REV CODE 255: Performed by: INTERNAL MEDICINE

## 2019-10-26 PROCEDURE — 80048 BASIC METABOLIC PNL TOTAL CA: CPT

## 2019-10-26 PROCEDURE — A9270 NON-COVERED ITEM OR SERVICE: HCPCS | Performed by: INTERNAL MEDICINE

## 2019-10-26 PROCEDURE — 99239 HOSP IP/OBS DSCHRG MGMT >30: CPT | Performed by: HOSPITALIST

## 2019-10-26 PROCEDURE — 83735 ASSAY OF MAGNESIUM: CPT

## 2019-10-26 PROCEDURE — 700111 HCHG RX REV CODE 636 W/ 250 OVERRIDE (IP): Performed by: PHYSICIAN ASSISTANT

## 2019-10-26 PROCEDURE — 700102 HCHG RX REV CODE 250 W/ 637 OVERRIDE(OP): Performed by: HOSPITALIST

## 2019-10-26 RX ORDER — ECHINACEA PURPUREA EXTRACT 125 MG
2 TABLET ORAL
Status: DISCONTINUED | OUTPATIENT
Start: 2019-10-26 | End: 2019-10-26 | Stop reason: HOSPADM

## 2019-10-26 RX ORDER — METOPROLOL SUCCINATE 50 MG/1
50 TABLET, EXTENDED RELEASE ORAL ONCE
Status: COMPLETED | OUTPATIENT
Start: 2019-10-26 | End: 2019-10-26

## 2019-10-26 RX ORDER — CLOPIDOGREL BISULFATE 75 MG/1
75 TABLET ORAL DAILY
Qty: 30 TAB | Refills: 11 | Status: SHIPPED | OUTPATIENT
Start: 2019-10-26

## 2019-10-26 RX ORDER — CLOPIDOGREL BISULFATE 75 MG/1
75 TABLET ORAL DAILY
Qty: 30 TAB | Refills: 1 | Status: SHIPPED | OUTPATIENT
Start: 2019-10-26 | End: 2019-10-26

## 2019-10-26 RX ORDER — SODIUM CHLORIDE 9 MG/ML
500 INJECTION, SOLUTION INTRAVENOUS
Status: DISPENSED | OUTPATIENT
Start: 2019-10-26 | End: 2019-10-26

## 2019-10-26 RX ORDER — SPIRONOLACTONE 25 MG/1
12.5 TABLET ORAL DAILY
Qty: 30 TAB | Refills: 3 | Status: SHIPPED | OUTPATIENT
Start: 2019-10-27 | End: 2019-10-26

## 2019-10-26 RX ORDER — METOPROLOL SUCCINATE 50 MG/1
200 TABLET, EXTENDED RELEASE ORAL
Status: DISCONTINUED | OUTPATIENT
Start: 2019-10-27 | End: 2019-10-26 | Stop reason: HOSPADM

## 2019-10-26 RX ORDER — ATORVASTATIN CALCIUM 40 MG/1
40 TABLET, FILM COATED ORAL EVERY EVENING
Qty: 90 TAB | Refills: 3 | Status: SHIPPED | OUTPATIENT
Start: 2019-10-26 | End: 2019-10-26

## 2019-10-26 RX ORDER — MIDAZOLAM HYDROCHLORIDE 1 MG/ML
.5-2 INJECTION INTRAMUSCULAR; INTRAVENOUS PRN
Status: DISPENSED | OUTPATIENT
Start: 2019-10-26 | End: 2019-10-26

## 2019-10-26 RX ORDER — SODIUM CHLORIDE 9 MG/ML
500 INJECTION, SOLUTION INTRAVENOUS
Status: CANCELLED | OUTPATIENT
Start: 2019-10-26 | End: 2019-10-26

## 2019-10-26 RX ORDER — ATORVASTATIN CALCIUM 40 MG/1
40 TABLET, FILM COATED ORAL EVERY EVENING
Qty: 90 TAB | Refills: 3 | Status: SHIPPED | OUTPATIENT
Start: 2019-10-26

## 2019-10-26 RX ORDER — DIGOXIN 125 MCG
125 TABLET ORAL DAILY
Status: DISCONTINUED | OUTPATIENT
Start: 2019-10-26 | End: 2019-10-26 | Stop reason: HOSPADM

## 2019-10-26 RX ORDER — ASPIRIN 81 MG/1
81 TABLET, CHEWABLE ORAL DAILY
Qty: 100 TAB | Refills: 3 | COMMUNITY
Start: 2019-10-27 | End: 2019-10-26

## 2019-10-26 RX ORDER — DIGOXIN 125 MCG
125 TABLET ORAL DAILY
Qty: 30 TAB | Refills: 0 | Status: SHIPPED | OUTPATIENT
Start: 2019-10-27 | End: 2019-11-01 | Stop reason: SDUPTHER

## 2019-10-26 RX ORDER — LOSARTAN POTASSIUM 25 MG/1
25 TABLET ORAL DAILY
Qty: 30 TAB | Refills: 3 | Status: SHIPPED | OUTPATIENT
Start: 2019-10-27 | End: 2019-10-26

## 2019-10-26 RX ORDER — ASPIRIN 81 MG/1
81 TABLET, CHEWABLE ORAL DAILY
Qty: 30 TAB | Refills: 0 | Status: SHIPPED | OUTPATIENT
Start: 2019-10-27 | End: 2019-11-12

## 2019-10-26 RX ORDER — FLUMAZENIL 0.1 MG/ML
INJECTION INTRAVENOUS
Status: DISCONTINUED
Start: 2019-10-26 | End: 2019-10-26

## 2019-10-26 RX ORDER — SPIRONOLACTONE 25 MG/1
12.5 TABLET ORAL DAILY
Qty: 30 TAB | Refills: 0 | Status: SHIPPED | OUTPATIENT
Start: 2019-10-27 | End: 2019-11-01

## 2019-10-26 RX ORDER — DIGOXIN 125 MCG
125 TABLET ORAL DAILY
Qty: 30 TAB | Refills: 3 | Status: SHIPPED | OUTPATIENT
Start: 2019-10-27 | End: 2019-10-26

## 2019-10-26 RX ORDER — GLIPIZIDE 5 MG/1
2.5 TABLET ORAL 2 TIMES DAILY
Qty: 30 TAB | Refills: 0 | Status: SHIPPED | OUTPATIENT
Start: 2019-10-26 | End: 2019-10-26 | Stop reason: SDUPTHER

## 2019-10-26 RX ORDER — LOSARTAN POTASSIUM 25 MG/1
25 TABLET ORAL DAILY
Qty: 30 TAB | Refills: 0 | Status: SHIPPED | OUTPATIENT
Start: 2019-10-27 | End: 2019-11-01

## 2019-10-26 RX ORDER — METOPROLOL SUCCINATE 200 MG/1
200 TABLET, EXTENDED RELEASE ORAL DAILY
Qty: 30 TAB | Refills: 0 | Status: SHIPPED | OUTPATIENT
Start: 2019-10-27 | End: 2019-12-11

## 2019-10-26 RX ORDER — METOPROLOL SUCCINATE 200 MG/1
200 TABLET, EXTENDED RELEASE ORAL DAILY
Qty: 30 TAB | Refills: 3 | Status: SHIPPED | OUTPATIENT
Start: 2019-10-27 | End: 2019-10-26

## 2019-10-26 RX ORDER — NALOXONE HYDROCHLORIDE 0.4 MG/ML
INJECTION, SOLUTION INTRAMUSCULAR; INTRAVENOUS; SUBCUTANEOUS
Status: DISCONTINUED
Start: 2019-10-26 | End: 2019-10-26

## 2019-10-26 RX ORDER — ASPIRIN 81 MG/1
81 TABLET, CHEWABLE ORAL DAILY
Qty: 30 TAB | Refills: 0 | COMMUNITY
Start: 2019-10-27 | End: 2019-10-26

## 2019-10-26 RX ORDER — GLIPIZIDE 5 MG/1
2.5 TABLET ORAL 2 TIMES DAILY
Qty: 30 TAB | Refills: 0 | Status: SHIPPED | OUTPATIENT
Start: 2019-10-26 | End: 2020-02-11 | Stop reason: SDUPTHER

## 2019-10-26 RX ADMIN — APIXABAN 5 MG: 5 TABLET, FILM COATED ORAL at 07:53

## 2019-10-26 RX ADMIN — ATORVASTATIN CALCIUM 40 MG: 40 TABLET, FILM COATED ORAL at 16:02

## 2019-10-26 RX ADMIN — LOSARTAN POTASSIUM 25 MG: 25 TABLET ORAL at 05:44

## 2019-10-26 RX ADMIN — HUMAN ALBUMIN MICROSPHERES AND PERFLUTREN 3 ML: 10; .22 INJECTION, SOLUTION INTRAVENOUS at 09:15

## 2019-10-26 RX ADMIN — ASPIRIN 81 MG 81 MG: 81 TABLET ORAL at 05:44

## 2019-10-26 RX ADMIN — RIVAROXABAN 20 MG: 20 TABLET, FILM COATED ORAL at 16:02

## 2019-10-26 RX ADMIN — MIDAZOLAM 1 MG: 1 INJECTION INTRAMUSCULAR; INTRAVENOUS at 08:20

## 2019-10-26 RX ADMIN — CLOPIDOGREL BISULFATE 75 MG: 75 TABLET ORAL at 07:53

## 2019-10-26 RX ADMIN — FENTANYL CITRATE 50 MCG: 50 INJECTION, SOLUTION INTRAMUSCULAR; INTRAVENOUS at 08:25

## 2019-10-26 RX ADMIN — MIDAZOLAM 2 MG: 1 INJECTION INTRAMUSCULAR; INTRAVENOUS at 07:48

## 2019-10-26 RX ADMIN — FENTANYL CITRATE 50 MCG: 50 INJECTION, SOLUTION INTRAMUSCULAR; INTRAVENOUS at 07:46

## 2019-10-26 RX ADMIN — INSULIN HUMAN 2 UNITS: 100 INJECTION, SOLUTION PARENTERAL at 12:34

## 2019-10-26 RX ADMIN — METOPROLOL SUCCINATE 50 MG: 50 TABLET, EXTENDED RELEASE ORAL at 10:35

## 2019-10-26 RX ADMIN — DIGOXIN 125 MCG: 125 TABLET ORAL at 10:35

## 2019-10-26 RX ADMIN — SPIRONOLACTONE 12.5 MG: 25 TABLET ORAL at 05:44

## 2019-10-26 RX ADMIN — METOPROLOL SUCCINATE 150 MG: 50 TABLET, FILM COATED, EXTENDED RELEASE ORAL at 05:44

## 2019-10-26 RX ADMIN — MIDAZOLAM 1 MG: 1 INJECTION INTRAMUSCULAR; INTRAVENOUS at 08:23

## 2019-10-26 ASSESSMENT — ENCOUNTER SYMPTOMS
UNEXPECTED WEIGHT CHANGE: 0
LIGHT-HEADEDNESS: 0
ABDOMINAL DISTENTION: 0
PALPITATIONS: 0
BRUISES/BLEEDS EASILY: 0
DIZZINESS: 0
SHORTNESS OF BREATH: 0
FATIGUE: 1
BLOOD IN STOOL: 0
COUGH: 0
CHEST TIGHTNESS: 0

## 2019-10-26 NOTE — PROGRESS NOTES
Cardiology Follow Up Progress Note    Date of Service  10/26/2019    Attending Physician  Vasquez Milan M.D.    Chief Complaint   Expressive aphasia    Cardiology consult   afib     HPI  Judith Vallecillo is a 53 y.o. female admitted 10/18/2019 with expressive aphasia. Transferred from [facility]. She was noted to have acute CVA (MRI showed acute area of infarction involving left posterior temporal,  and found to be afib. ECHO showed ef 20%    History of diabetes, cva and tobacco use.    Interim Events  Overnight she had a nosebleed, was stopped with manual pressure. This morning she reports orthopnea. Denies dizziness, palpitations, chest pain leg swelling. She has ambulated the halls and walked to restroom, denies dyspnea with this minimal exertion.     She expresses motivation to quit smoking, she declines nicotine replacement therapy.    Afib overnight with rates 90-120s.     Review of Systems  Review of Systems   Constitutional: Positive for fatigue. Negative for unexpected weight change.   HENT: Positive for nosebleeds.    Respiratory: Negative for cough, chest tightness and shortness of breath.    Cardiovascular: Negative for chest pain, palpitations and leg swelling.        +orthopnea   Gastrointestinal: Negative for abdominal distention and blood in stool.   Genitourinary: Negative for hematuria.   Musculoskeletal: Negative for gait problem.   Neurological: Negative for dizziness and light-headedness.   Hematological: Does not bruise/bleed easily.       Vital signs in last 24 hours  Temp:  [35.9 °C (96.7 °F)-36.9 °C (98.4 °F)] 36 °C (96.8 °F)  Pulse:  [] 70  Resp:  [16-18] 16  BP: (119-155)/() 155/82  SpO2:  [92 %-96 %] 96 %    Physical Exam  Physical Exam   Constitutional: She is oriented to person, place, and time. She appears well-developed and well-nourished. No distress.   HENT:   Head: Normocephalic and atraumatic.   Mouth/Throat: Oropharynx is clear and moist.   Eyes: No scleral  icterus.   Neck: Neck supple. No JVD (at 90deg) present.   Cardiovascular: Normal rate. An irregularly irregular rhythm present.   Pulses:       Radial pulses are 2+ on the right side, and 2+ on the left side.        Dorsalis pedis pulses are 2+ on the right side, and 2+ on the left side.   Pulmonary/Chest: Effort normal and breath sounds normal. No respiratory distress. She has no wheezes. She has no rales.   Abdominal: Soft. She exhibits no distension. There is no tenderness.   Musculoskeletal: She exhibits no edema.   Neurological: She is alert and oriented to person, place, and time.   Skin: Skin is warm and dry. She is not diaphoretic.   Psychiatric: Judgment normal.   Nursing note and vitals reviewed.      Lab Review  Lab Results   Component Value Date/Time    WBC 5.2 10/25/2019 03:12 AM    RBC 4.02 (L) 10/25/2019 03:12 AM    HEMOGLOBIN 11.9 (L) 10/25/2019 03:12 AM    HEMATOCRIT 37.9 10/25/2019 03:12 AM    MCV 94.3 10/25/2019 03:12 AM    MCH 29.6 10/25/2019 03:12 AM    MCHC 31.4 (L) 10/25/2019 03:12 AM    MPV 10.3 10/25/2019 03:12 AM      Lab Results   Component Value Date/Time    SODIUM 137 10/26/2019 03:09 AM    POTASSIUM 3.9 10/26/2019 03:09 AM    CHLORIDE 103 10/26/2019 03:09 AM    CO2 23 10/26/2019 03:09 AM    GLUCOSE 133 (H) 10/26/2019 03:09 AM    BUN 20 10/26/2019 03:09 AM    CREATININE 1.40 10/26/2019 03:09 AM      Lab Results   Component Value Date/Time    ASTSGOT 17 10/22/2019 11:24 AM    ALTSGPT 12 10/22/2019 11:24 AM     Lab Results   Component Value Date/Time    CHOLSTRLTOT 117 10/20/2019 02:59 AM    LDL 62 10/20/2019 02:59 AM    HDL 23 (A) 10/20/2019 02:59 AM    TRIGLYCERIDE 162 (H) 10/20/2019 02:59 AM    TROPONINT 22 (H) 10/19/2019 11:36 AM       No results for input(s): NTPROBNP in the last 72 hours.    Cardiac Imaging and Procedures Review  Echocardiogram:    10/19/19  Four chamber dilation. Biventricular failure. LV systolic function is   severely reduced. LVEF 20% with global hypokinesis. No  significant   valuvlar disease visualized on this study. RVSP estimated at 40 mmHg.   IVC is dilated and fails to collapse with inspiration suggesting   elevated central venous pressures. Compared to the images of the study   done on 06/01/2011 there has been a severe decline in biventricular   Function.    Cardiac Catheterization:   10/24/19  PostOp Diagnosis:   Single vessel CAD (70% mid and 90% distal LAD), non obstructive 50% mid LCX  S/p 3x15 and 2.5x15 mm Xience KAYCE to LAD  Cardiomyopathy predominantly nonischemic etiology    MRI brain  10/19/19  1.  Moderate size acute area of infarction involving the left posterior temporal, left parietal-occipital, and lateral occipital lobes.    2.  Small chronic area of lacunar infarction involving the left thalamus.    3.  Minimal periventricular, juxtacortical, and pontine white matter changes consistent with chronic microvascular ischemic gliosis    Carotid Duplex  10/19/19   Mild bilateral internal carotid artery stenosis (<50%).    Thyroid nodules measuring less than 1 cm.    Assessment/Plan  Atrial fibrillation, persistent  STELLA Clot  - [id number]  - SONIA this morning shows STELLA clot.   - rates uncontrolled: inc metoprolol XL to 200mg  for rate control, restart digoxin 125mcg  - OAC per hospitalist/INS coverage, currently on Eliqius 5mg bid  - 4 weeks of OAC then re-attempt SONIA/DCCV as outpatient    Acute biventricular heart failure  Suspect tachycardia induced cardiomyopathy versus ischemic cardiomyopathy  -LVEF 20%, severely dilated LV  - Metoprolol XL increase to 200mg   - Losartan 25mg   - continue yulissa 12.5mg daily  - discharge home with lasix 20mg and potassium 10meq PRN weight gain or shortness of breath.     Ischemic Cardiomyopathy  CAD s/p KAYCE to LAD  - cath showed 70% and 90% distal LAD and nonobstructive 50% mid LCX, got 2 KAYCE to LAD.   - DAPT: aspirin and changed to plavix (effient contraindicated due to stroke) for 30 days then stop aspirin, continue  plavix and OAC  - continue bb therapy      Acute left MCA ischemic stroke 2/2 AF  - continue asa and statin therapy     Diabetes Mellitus type 2  - uncontrolled  - defer to hospitalist for medications  - once established in clinic consider SGLT2 inhibitor for cardiovascular benefit      Thank you for allowing me to participate in the care of this patient.  Ready for discharge from cardiology standpoint.     Future Appointments   Date Time Provider Department Center   11/1/2019  9:00 AM CARMENZA [facility]. [facility] None       Staffed with Dr. Westfall

## 2019-10-26 NOTE — PROGRESS NOTES
Patient discharged. Patient is A&Ox4, provided discharge instructions, removed IV and tele box, and monitor room notified. Patient is being driven home by her sister.    Patient had medications re e-scribed to the 24hr [facility] because she was not going to be leaving for [place] until tomorrow afternoon. Patient given evening doses of medications and informed the importance of picking up medications and if they have any issues getting the medications from [facility] to call the [facility] pharmacy in [place]. Patient also instructed the importance of establishing a PCP in order for her Xarelto to be prescribed to her within those 30 days. Patient verbalized that she understood and her sister, Desiree, also verbalized she understood as well.

## 2019-10-26 NOTE — CARE PLAN
Problem: Communication  Goal: The ability to communicate needs accurately and effectively will improve  Outcome: PROGRESSING AS EXPECTED     Problem: Safety  Goal: Will remain free from falls  Outcome: PROGRESSING AS EXPECTED     Problem: Bowel/Gastric:  Goal: Will not experience complications related to bowel motility  Outcome: PROGRESSING AS EXPECTED

## 2019-10-26 NOTE — PROGRESS NOTES
Received report on patient. She is resting in bed, has no complaints of pain and no indications of distress. Bed in the lowest position and call light and personal belongings within reach.

## 2019-10-26 NOTE — DISCHARGE PLANNING
Per Johnna HICKS, Dr. Orozco was unable to reach someone to complete the prior auth today. He would like to send pt on Xarelto, and this CM provided pt with a 30 day free coupon for Xarelto.   CM discussed and stressed the importance of taking Xarelto with pt. CM discussed that if for any reason pt cannot get the Xarelto, then she will need to pay for a couple of days to get her through the weekend, and then call this CM on Monday and this CM can assist through the [facility]. Pt states that she understands and has the means to pay for a few days of Xarelto if for any reason the 30 day free card does not work. CM's contact info will be placed on pt's discharge paperwork by Johnna HICKS, in the event there are any issues.  Pt understands that she must establish with a PCP for continued Xarelto refills and so that insurance can authorize. Pt's sister will be in town until next Friday and will help with transport and getting PCP arrangements made.

## 2019-10-26 NOTE — PROGRESS NOTES
At about 0300 this morning, patient sneezed and developed nose bleed from left nare. Applied gauze, pressure, and ice pack eternally for 15 min but would not stop bleeding, patient on anticoagulants. Gave patient approx 1 cup of ice chips to help resolve bleed. Patient nose bleed stopped approx 20 min later.     Morning- Meds Eliquis and Plavix Held (pushed to 9 am) for further clarification from provider.  Plavix is a new order and want to make sure provider wants patient to have both this AM, also due to nose bleed this a.m. Would like further clarification. Patient has scheduled cardioversion and SONIA @0800, explained to Day RN in bedside report.

## 2019-10-26 NOTE — DISCHARGE PLANNING
Pt is now going to stay overnight in [place] and wants to  her prescriptions from [facility]. MAT spoke with [facility] in [place] and let them know and they will cancel out the prescriptions so [facility] can bill through insurance.   Dr. Orozco will send prescriptions to [facility].  CM updated pt.

## 2019-10-26 NOTE — PROGRESS NOTES
Telemetry Shift Summary     Rhythm: A Fib  HR Range:   Ectopy: PVC, Couplets, 9 beat V-Tach  Measurements: -/0.10/-           Normal Values  Rhythm SR  HR Range    Measurements 0.12-0.20 / 0.06-0.10  / 0.30-0.52    12 Hr CC

## 2019-10-26 NOTE — DISCHARGE INSTRUCTIONS
For your new coronary artery stent: Take rivaroxaban daily and aspirin 81 mg and Plavix 75 mg daily for 30 days.  On 11/24 stop aspirin and continue Plavix once daily and rivaroxaban daily.        Anne : [phone]     Discharge Instructions per Rozina Orozco M.D.  Follow-up with primary care provider within 5 days.  Follow-up with cardiology as instructed  DIET: Cardiac, renal, diabetic.   DIAGNOSIS: Cerebral infarction, atrial fibrillation, Acute combined systolic and diastolic heart failure, Acute kidney injury, Bilateral carotid artery stenosis   Diabetes mellitus   Return to ER if you develop any of the following symptoms fever, chills, weakness, not feeling well, rash, bleeding, blurred vision, palpitations, cough, pain in any part of your body, shortness of breath, nausea, vomiting, diarrhea, difficulty with urination, dizziness, headache, seizures, loss of consciousness or if you develop any new symptoms.         Discharge Instructions    Discharged to home by car with relative. Discharged via wheelchair, hospital escort: Yes.  Special equipment needed: Not Applicable    Be sure to schedule a follow-up appointment with your primary care doctor or any specialists as instructed.     Discharge Plan:   Diet Plan: Discussed  Activity Level: Discussed  Smoking Cessation Offered: Patient Counseled  Confirmed Follow up Appointment: Appointment Scheduled  Confirmed Symptoms Management: Discussed  Medication Reconciliation Updated: Yes  Influenza Vaccine Indication: Indicated: 9 to 64 years of age  Influenza Vaccine Given - only chart on this line when given: Influenza Vaccine Given (See MAR)    I understand that a diet low in cholesterol, fat, and sodium is recommended for good health. Unless I have been given specific instructions below for another diet, I accept this instruction as my diet prescription.   Other diet: Cardiac/Diabetic    Special Instructions: Diagnosis:  Acute Coronary Syndrome  (ACS) is a diagnosis that encompasses cardiac-related chest pain and heart attack. ACS occurs when the blood flow to the heart muscle is severely reduced or cut off completely due to a slow process called atherosclerosis.  Atherosclerosis is a disease in which the coronary arteries become narrow from a buildup of fat, cholesterol, and other substances that combine to form plaque. If the plaque breaks, a blood clot will form and block the blood flow to the heart muscle. This lack of blood flow can cause damage or death to the heart muscle which is called a heart attack or Myocardial Infarction (MI). There are two different types of MIs:  ST Elevation Myocardial Infarction or STEMI (the most severe type of heart attack) and Non-ST Elevation Myocardial Infarction or NSTEMI.    Treatment Plan:  · Cardiac Diet  - Low fat, low salt, low cholesterol   · Cardiac Rehab  - Your doctor has ordered you a referral to [facility].  Call [phone] to schedule an appointment.  · Attend my follow-up appointment with my Cardiologist.  · Take my medications as prescribed by my doctor  · Exercise daily  · Lower my bad cholesterol and raise my good cholesterol, lower my blood pressure, Reduce stress and Control my diabetes    Medications:  Certain medications are used to treat ACS.  Remember to always take medications as prescribed and never stop talking medications unless told by your doctor.    You have been prescribed the following medicatons:    Aspirin - Aspirin is used as a blood thinning medication and you will require this medication indefinitely.  Anti-platelet/blood thinner - Your Anti-platelet/Blood thinning medication is called xarelto, and is used in combination with aspirin to prevent clots from forming in your heart and/or around your stent.  Your doctor will determine how long you need to be on this medicine.  Beta-Blocker - Beta-Blocker metoprolol is used to lower blood pressure and heart rate, and/or helps your heart  heal after a heart attack.  Statin - Statin Lipitor is used to lower cholesterol.  ARB is Losartan     Angiogram, Care After  Refer to this sheet in the next few weeks. These instructions provide you with information about caring for yourself after your procedure. Your health care provider may also give you more specific instructions. Your treatment has been planned according to current medical practices, but problems sometimes occur. Call your health care provider if you have any problems or questions after your procedure.  WHAT TO EXPECT AFTER THE PROCEDURE  After your procedure, it is typical to have the following:  · Bruising at the catheter insertion site that usually fades within 1-2 weeks.  · Blood collecting in the tissue (hematoma) that may be painful to the touch. It should usually decrease in size and tenderness within 1-2 weeks.  HOME CARE INSTRUCTIONS  · Take medicines only as directed by your health care provider.  · You may shower 24-48 hours after the procedure or as directed by your health care provider. Remove the bandage (dressing) and gently wash the site with plain soap and water. Pat the area dry with a clean towel. Do not rub the site, because this may cause bleeding.  · Do not take baths, swim, or use a hot tub until your health care provider approves.  · Check your insertion site every day for redness, swelling, or drainage.  · Do not apply powder or lotion to the site.  · Do not lift over 10 lb (4.5 kg) for 5 days after your procedure or as directed by your health care provider.  · Ask your health care provider when it is okay to:  ¨ Return to work or school.  ¨ Resume usual physical activities or sports.  ¨ Resume sexual activity.  · Do not drive home if you are discharged the same day as the procedure. Have someone else drive you.  · You may drive 24 hours after the procedure unless otherwise instructed by your health care provider.  · Do not operate machinery or power tools for 24 hours  after the procedure or as directed by your health care provider.  · If your procedure was done as an outpatient procedure, which means that you went home the same day as your procedure, a responsible adult should be with you for the first 24 hours after you arrive home.  · Keep all follow-up visits as directed by your health care provider. This is important.  SEEK MEDICAL CARE IF:  · You have a fever.  · You have chills.  · You have increased bleeding from the catheter insertion site. Hold pressure on the site.  SEEK IMMEDIATE MEDICAL CARE IF:  · You have unusual pain at the catheter insertion site.  · You have redness, warmth, or swelling at the catheter insertion site.  · You have drainage (other than a small amount of blood on the dressing) from the catheter insertion site.  · The catheter insertion site is bleeding, and the bleeding does not stop after 30 minutes of holding steady pressure on the site.  · The area near or just beyond the catheter insertion site becomes pale, cool, tingly, or numb.     This information is not intended to replace advice given to you by your health care provider. Make sure you discuss any questions you have with your health care provider.     Document Released: 07/06/2006 Document Revised: 01/08/2016 Document Reviewed: 07/06/2015  [employer] Interactive Patient Education ©2016 [employer]      · Is patient discharged on Warfarin / Coumadin?   No       HF Patient Discharge Instructions  · Monitor your weight daily, and maintain a weight chart, to track your weight changes.   · Activity as tolerated, unless your Doctor has ordered otherwise. Other activity order: NA.  · Follow a low fat, low cholesterol, low salt diet unless instructed otherwise by your Doctor. Read the labels on the back of food products and track your intake of fat, cholesterol and salt.   · Fluid Restriction No. If a Fluid Restriction has been ordered by your Doctor, measure fluids with a measuring cup to ensure  that you are not exceeding the restriction.   · No smoking.  · Oxygen No. If your Doctor has ordered that you wear Oxygen at home, it is important to wear it as ordered.  · Did you receive an explanation from staff on the importance of taking each of your medications and why it is necessary to keep taking them unless your doctor says to stop? Yes  · Were all of your questions answered about how to manage your heart failure and what to do if you have increased signs and symptoms after you go home? Yes  · Do you feel like your heart failure care team involved you in the care treatment plan and allowed you to make decisions regarding your care while in the hospital and addressed any discharge needs you might have? Yes    See the educational handout provided at discharge for more information on monitoring your daily weight, activity and diet. This also explains more about Heart Failure, symptoms of a flare-up and some of the tests that you have undergone.     Warning Signs of a Flare-Up include:  · Swelling in the ankles or lower legs.  · Shortness of breath, while at rest, or while doing normal activities.   · Shortness of breath at night when in bed, or coughing in bed.   · Requiring more pillows to sleep at night, or needing to sit up at night to sleep.  · Feeling weak, dizzy or fatigued.     When to call your Doctor:  · [facility] seven days a week from 8:00 a.m. to 8:00 p.m. for medical questions [phone].  · Call your Primary Care Physician or Cardiologist if:   1. You experience any pain radiating to your jaw or neck.  2. You have any difficulty breathing.  3. You experience weight gain of 3 lbs in a day or 5 lbs in a week.   4. You feel any palpitations or irregular heartbeats.  5. You become dizzy or lose consciousness.   If you have had an angiogram or had a pacemaker or AICD placed, and experience:  1. Bleeding, drainage or swelling at the surgical / puncture site.  2. Fever greater than  100.0 F  3. Shock from internal defibrillator.  4. Cool and / or numb extremities.      Ischemic Stroke  An ischemic stroke (cerebrovascular accident, or CVA) is the sudden death of brain tissue that occurs when an area of the brain does not get enough oxygen. It is a medical emergency that must be treated right away. An ischemic stroke can cause permanent loss of brain function. This can cause problems with how different parts of your body function.  What are the causes?  This condition is caused by a decrease of oxygen supply to an area of the brain, which may be the result of:  · A small blood clot (embolus) or a buildup of plaque in the blood vessels (atherosclerosis) that blocks blood flow in the brain.  · An abnormal heart rhythm (atrial fibrillation).  · A blocked or damaged artery in the head or neck.  What increases the risk?  Certain factors may make you more likely to develop this condition. Some of these factors are things that you can change, such as:  · Obesity.  · Smoking cigarettes.  · Taking oral birth control, especially if you also use tobacco.  · Physical inactivity.  · Excessive alcohol use.  · Use of illegal drugs, especially cocaine and methamphetamine.  Other risk factors include:  · High blood pressure (hypertension).  · High cholesterol.  · Diabetes mellitus.  · Heart disease.  · Being , , , or .  · Being over age 60.  · Family history of stroke.  · Previous history of blood clots, stroke, or transient ischemic attack (TIA).  · Sickle cell disease.  · Being a woman with a history of preeclampsia.  · Migraine headache.  · Sleep apnea.  · Irregular heartbeats, such as atrial fibrillation.  · Chronic inflammatory diseases, such as rheumatoid arthritis or lupus.  · Blood clotting disorders (hypercoagulable state).  What are the signs or symptoms?  Symptoms of this condition usually develop suddenly, or you may notice them after waking up from  sleep. Symptoms may include sudden:  · Weakness or numbness in your face, arm, or leg, especially on one side of your body.  · Trouble walking or difficulty moving your arms or legs.  · Loss of balance or coordination.  · Confusion.  · Slurred speech (dysarthria).  · Trouble speaking, understanding speech, or both (aphasia).  · Vision changes--such as double vision, blurred vision, or loss of vision--in one or both eyes.  · Dizziness.  · Nausea and vomiting.  · Severe headache with no known cause. The headache is often described as the worst headache ever experienced.  If possible, make note of the exact time that you last felt like your normal self and what time your symptoms started. Tell your health care provider.  If symptoms come and go, this could be a sign of a warning stroke, or TIA. Get help right away, even if you feel better.  How is this diagnosed?  This condition may be diagnosed based on:  · Your symptoms, your medical history, and a physical exam.  · CT scan of the brain.  · MRI.  · CT angiogram. This test uses a computer to take X-rays of your arteries. A dye may be injected into your blood to show the inside of your blood vessels more clearly.  · MRI angiogram. This is a type of MRI that is used to evaluate the blood vessels.  · Cerebral angiogram. This test uses X-rays and a dye to show the blood vessels in the brain and neck.  You may need to see a health care provider who specializes in stroke care. A stroke specialist can be seen in person or through communication using telephone or television technology (telemedicine).  Other tests may also be done to find the cause of the stroke, such as:  · Electrocardiogram (ECG).  · Continuous heart monitoring.  · Echocardiogram.  · Carotid ultrasound.  · A scan of the brain circulation.  · Blood tests.  · Sleep study to check for sleep apnea.  How is this treated?  Treatment for this condition will depend on the duration, severity, and cause of your  symptoms and on the area of the brain affected. It is very important to get treatment at the first sign of stroke symptoms. Some treatments work better if they are done within 3-6 hours of the onset of stroke symptoms. These initial treatments may include:  · Aspirin.  · Medicines to control blood pressure.  · Medicine given by injection to dissolve the blood clot (thrombolytic).  · Treatments given directly to the affected artery to remove or dissolve the blood clot.  Other treatment options may include:  · Oxygen.  · IV fluids.  · Medicines to thin the blood (anticoagulants or antiplatelets).  · Procedures to increase blood flow.  Medicines and changes to your diet may be used to help treat and manage risk factors for stroke, such as diabetes, high cholesterol, and high blood pressure.  After a stroke, you may work with physical, speech, mental health, or occupational therapists to help you recover.  Follow these instructions at home:  Medicines  · Take over-the-counter and prescription medicines only as told by your health care provider.  · If you were told to take a medicine to thin your blood, such as aspirin or an anticoagulant, take it exactly as told by your health care provider.  ¨ Taking too much blood-thinning medicine can cause bleeding.  ¨ If you do not take enough blood-thinning medicine, you will not have the protection that you need against another stroke and other problems.  · Understand the side effects of taking anticoagulant medicine. When taking this type of medicine, make sure you:  ¨ Hold pressure over any cuts for longer than usual.  ¨ Tell your dentist and other health care providers that you are taking anticoagulants before you have any procedures that may cause bleeding.  ¨ Avoid activities that may cause trauma or injury.  Eating and drinking  · Follow instructions from your health care provider about diet.  · Eat healthy foods.  · If your ability to swallow was affected by the stroke,  you may need to take steps to avoid choking, such as:  ¨ Taking small bites when eating.  ¨ Eating foods that are soft or pureed.  Safety  · Follow instructions from your health care team about physical activity.  · Use a walker or cane as told by your health care provider.  · Take steps to create a safe home environment in order to reduce the risk of falls. This may include:  ¨ Having your home looked at by specialists.  ¨ Installing grab bars in the bedroom and bathroom.  ¨ Using safety equipment, such as raised toilets and a seat in the shower.  General instructions  · Do not use any tobacco products, such as cigarettes, chewing tobacco, and e-cigarettes. If you need help quitting, ask your health care provider.  · Limit alcohol intake to no more than 1 drink a day for nonpregnant women and 2 drinks a day for men. One drink equals 12 oz of beer, 5 oz of wine, or 1½ oz of hard liquor.  · If you need help to stop using drugs or alcohol, ask your health care provider about a referral to a program or specialist.  · Maintain an active and healthy lifestyle. Get regular exercise as told by your health care provider.  · Keep all follow-up visits as told by your health care provider, including visits with all specialists on your health care team. This is important.  How is this prevented?  Your risk of another stroke can be decreased by managing high blood pressure, high cholesterol, diabetes, heart disease, sleep apnea, and obesity. It can also be decreased by quitting smoking, limiting alcohol, and staying physically active.  Your health care provider will continue to work with you on measures to prevent short-term and long-term complications of stroke.  Get help right away if:  You have:  · Sudden weakness or numbness in your face, arm, or leg, especially on one side of your body.  · Sudden confusion.  · Sudden trouble speaking, understanding, or both (aphasia).  · Sudden trouble seeing with one or both  eyes.  · Sudden trouble walking or difficulty moving your arms or legs.  · Sudden dizziness.  · Sudden loss of balance or coordination.  · Sudden, severe headache with no known cause.  · A partial or total loss of consciousness.  · A seizure.  Any of these symptoms may represent a serious problem that is an emergency. Do not wait to see if the symptoms will go away. Get medical help right away. Call your local emergency services (911 in [place] Do not drive yourself to the hospital.   This information is not intended to replace advice given to you by your health care provider. Make sure you discuss any questions you have with your health care provider.  Document Released: 12/18/2006 Document Revised: 05/30/2017 Document Reviewed: 03/15/2017  [employer] Interactive Patient Education © 2017 [employer]    Rivaroxaban oral tablets  What is this medicine?  RIVAROXABAN (ri va ADE a ban) is an anticoagulant (blood thinner). It is used to treat blood clots in the lungs or in the veins. It is also used after knee or hip surgeries to prevent blood clots. It is also used to lower the chance of stroke in people with a medical condition called atrial fibrillation.  This medicine may be used for other purposes; ask your health care provider or pharmacist if you have questions.  COMMON BRAND NAME(S): Xarelto, Xarelto Starter Pack  What should I tell my health care provider before I take this medicine?  They need to know if you have any of these conditions:  -bleeding disorders  -bleeding in the brain  -blood in your stools (black or tarry stools) or if you have blood in your vomit  -history of stomach bleeding  -kidney disease  -liver disease  -low blood counts, like low white cell, platelet, or red cell counts  -recent or planned spinal or epidural procedure  -take medicines that treat or prevent blood clots  -an unusual or allergic reaction to rivaroxaban, other medicines, foods, dyes, or preservatives  -pregnant or trying to  get pregnant  -breast-feeding  How should I use this medicine?  Take this medicine by mouth with a glass of water. Follow the directions on the prescription label. Take your medicine at regular intervals. Do not take it more often than directed. Do not stop taking except on your doctor's advice. Stopping this medicine may increase your risk of a blood clot. Be sure to refill your prescription before you run out of medicine.  If you are taking this medicine after hip or knee replacement surgery, take it with or without food. If you are taking this medicine for atrial fibrillation, take it with your evening meal. If you are taking this medicine to treat blood clots, take it with food at the same time each day. If you are unable to swallow your tablet, you may crush the tablet and mix it in applesauce. Then, immediately eat the applesauce. You should eat more food right after you eat the applesauce containing the crushed tablet.  Talk to your pediatrician regarding the use of this medicine in children. Special care may be needed.  Overdosage: If you think you have taken too much of this medicine contact a poison control center or emergency room at once.  NOTE: This medicine is only for you. Do not share this medicine with others.  What if I miss a dose?  If you take your medicine once a day and miss a dose, take the missed dose as soon as you remember. If you take your medicine twice a day and miss a dose, take the missed dose immediately. In this instance, 2 tablets may be taken at the same time. The next day you should take 1 tablet twice a day as directed.  What may interact with this medicine?  Do not take this medicine with any of the following medications:  -defibrotide  This medicine may also interact with the following medications:  -aspirin and aspirin-like medicines  -certain antibiotics like erythromycin, azithromycin, and clarithromycin  -certain medicines for fungal infections like ketoconazole and  itraconazole  -certain medicines for irregular heart beat like amiodarone, quinidine, dronedarone  -certain medicines for seizures like carbamazepine, phenytoin  -certain medicines that treat or prevent blood clots like warfarin, enoxaparin, and dalteparin  -conivaptan  -diltiazem  -felodipine  -indinavir  -lopinavir; ritonavir  -NSAIDS, medicines for pain and inflammation, like ibuprofen or naproxen  -ranolazine  -rifampin  -ritonavir  -SNRIs, medicines for depression, like desvenlafaxine, duloxetine, levomilnacipran, venlafaxine  -SSRIs, medicines for depression, like citalopram, escitalopram, fluoxetine, fluvoxamine, paroxetine, sertraline  -Mineral's wort  -verapamil  This list may not describe all possible interactions. Give your health care provider a list of all the medicines, herbs, non-prescription drugs, or dietary supplements you use. Also tell them if you smoke, drink alcohol, or use illegal drugs. Some items may interact with your medicine.  What should I watch for while using this medicine?  Visit your doctor or health care professional for regular checks on your progress.  Notify your doctor or health care professional and seek emergency treatment if you develop breathing problems; changes in vision; chest pain; severe, sudden headache; pain, swelling, warmth in the leg; trouble speaking; sudden numbness or weakness of the face, arm or leg. These can be signs that your condition has gotten worse.  If you are going to have surgery or other procedure, tell your doctor that you are taking this medicine.  What side effects may I notice from receiving this medicine?  Side effects that you should report to your doctor or health care professional as soon as possible:  -allergic reactions like skin rash, itching or hives, swelling of the face, lips, or tongue  -back pain  -redness, blistering, peeling or loosening of the skin, including inside the mouth  -signs and symptoms of bleeding such as bloody or  black, tarry stools; red or dark-brown urine; spitting up blood or brown material that looks like coffee grounds; red spots on the skin; unusual bruising or bleeding from the eye, gums, or nose  Side effects that usually do not require medical attention (report to your doctor or health care professional if they continue or are bothersome):  -dizziness  -muscle pain  This list may not describe all possible side effects. Call your doctor for medical advice about side effects. You may report side effects to FDA at 8-623-FDA-9945.  Where should I keep my medicine?  Keep out of the reach of children.  Store at room temperature between 15 and 30 degrees C (59 and 86 degrees F). Throw away any unused medicine after the expiration date.  NOTE: This sheet is a summary. It may not cover all possible information. If you have questions about this medicine, talk to your doctor, pharmacist, or health care provider.  © 2018 Elsevier/Gold Standard (2017-09-06 16:29:33)    Depression / Suicide Risk    As you are discharged from this [facility] facility, it is important to learn how to keep safe from harming yourself.    Recognize the warning signs:  · Abrupt changes in personality, positive or negative- including increase in energy   · Giving away possessions  · Change in eating patterns- significant weight changes-  positive or negative  · Change in sleeping patterns- unable to sleep or sleeping all the time   · Unwillingness or inability to communicate  · Depression  · Unusual sadness, discouragement and loneliness  · Talk of wanting to die  · Neglect of personal appearance   · Rebelliousness- reckless behavior  · Withdrawal from people/activities they love  · Confusion- inability to concentrate     If you or a loved one observes any of these behaviors or has concerns about self-harm, here's what you can do:  · Talk about it- your feelings and reasons for harming yourself  · Remove any means that you might use to hurt yourself  (examples: pills, rope, extension cords, firearm)  · Get professional help from the community (Mental Health, Substance Abuse, psychological counseling)  · Do not be alone:Call your Safe Contact- someone whom you trust who will be there for you.  · Call your local CRISIS HOTLINE 790-1818 or 681-750-3354  · Call your local Children's Mobile Crisis Response Team [place] (359) 856-4876 or www.[employer]  · Call the toll free National Suicide Prevention Hotlines   · National Suicide Prevention Lifeline 317-258-RJES (7936)  · National Hope Line Network 800-SUICIDE (284-0228)

## 2019-10-26 NOTE — DISCHARGE PLANNING
No response back from Dr. Milan about Eliquis and it requiring prior auth. CM updated Dr. Orozco via tiger text to let him know of situation.

## 2019-10-26 NOTE — DISCHARGE SUMMARY
Discharge Summary    CHIEF COMPLAINT ON ADMISSION  Chief Complaint   Patient presents with   • Atrial Fibrillation     pt was in afib RVR at outside facility in the 170's   • Sent by MD     transfer from [facility]     Reason for Admission  Chest pain    Admission Date  10/18/2019    CODE STATUS  Full Code      HPI & HOSPITAL COURSE  This is a 53 y.o. female past medical history of diabetes not on treatment, continued tobacco use.  Admitted October 18 with chest pain.  Patient was found to have atrial fibrillation with rapid ventricular response.  She was also found to have acute biventricular failure cardiology have been consulted.  Patient was started on medical therapy with improvement in her symptoms.  EF is 15%-20%. She had a cath with placement of drug eluting stent. Transesophageal echocardiography showed left atrial appendage clot accordingly the patient was not electrically cardioverted. The patient has carotid artery stenosis less 50% bilaterally.      Therefore, she is discharged in fair and stable condition to home with close outpatient follow-up.    The patient met 2-midnight criteria for an inpatient stay at the time of discharge.    Discharge Date  10/26/2019     FOLLOW UP ITEMS POST DISCHARGE  Follow-up with primary care within 5 days.  Follow-up with cardiology as instructed.    DISCHARGE DIAGNOSES  Principal Problem:    Cerebral infarction (HCC) POA: Yes  Active Problems:    AF (atrial fibrillation) (HCC) POA: Yes    Acute combined systolic and diastolic heart failure (HCC) POA: Yes    JORGE (acute kidney injury) (HCC) POA: Yes    Broca's aphasia POA: Yes    Smoker POA: Yes    Bilateral carotid artery stenosis POA: Yes    Coronary artery disease involving native coronary artery of native heart without angina pectoris POA: Yes    Type 2 diabetes mellitus with hyperglycemia, without long-term current use of insulin (HCC) POA: Yes    Class 2 obesity due to excess calories without serious comorbidity  with body mass index (BMI) of 36.0 to 36.9 in adult POA: Yes  Resolved Problems:    Hypokalemia POA: Yes    FOLLOW UP  Future Appointments   Date Time Provider Department Center   11/1/2019  9:00 AM ROSEANN Pratt [facility] None     Samuel Guzman M.D.  [address] [place] [address] [phone]      Please call to establish care with a new Primary care provider. Thank you.    S Duane Laguna M.D.  [address]   [facility] [address] [phone]      Please call to establish care with a new Primary care provider. Thank you.    ATIF Rodrigues  [address] [facility] [address] [phone]      Please call to establish care with a new Primary care provider. Thank you.      MEDICATIONS ON DISCHARGE     Medication List      START taking these medications      Instructions   aspirin 81 MG Chew chewable tablet  Start taking on:  10/27/2019  Commonly known as:  ASA   Take 1 Tab by mouth every day.  Dose:  81 mg     atorvastatin 40 MG Tabs  Commonly known as:  LIPITOR   Take 1 Tab by mouth every evening.  Dose:  40 mg     clopidogrel 75 MG Tabs  Commonly known as:  PLAVIX   Take 1 Tab by mouth every day.  Dose:  75 mg     digoxin 125 MCG Tabs  Start taking on:  10/27/2019  Commonly known as:  LANOXIN   Take 1 Tab by mouth every day at 6 PM.  Dose:  125 mcg     glipiZIDE 5 MG Tabs  Commonly known as:  GLUCOTROL   Take 0.5 Tabs by mouth 2 times a day.  Dose:  2.5 mg     losartan 25 MG Tabs  Start taking on:  10/27/2019  Commonly known as:  COZAAR   Take 1 Tab by mouth every day.  Dose:  25 mg     metoprolol 200 MG XL tablet  Start taking on:  10/27/2019  Commonly known as:  TOPROL-XL   Take 1 Tab by mouth every day.  Dose:  200 mg     rivaroxaban 20 MG Tabs tablet  Commonly known as:  XARELTO   Take 1 Tab by mouth with dinner.  Dose:  20 mg     spironolactone 25 MG Tabs  Start taking on:  10/27/2019  Commonly known as:  ALDACTONE   Take 0.5 Tabs by mouth every day.  Dose:  12.5  "mg        STOP taking these medications    ALEVE 220 MG tablet  Generic drug:  naproxen          Allergies  Allergies   Allergen Reactions   • Bee Anaphylaxis     Swelling and airway closure.   • Latex Itching and Swelling     Swelling, redness, itching.   • Strawberry Itching     itching     DIET  Orders Placed This Encounter   Procedures   • Diet Order Diabetic, Cardiac     Standing Status:   Standing     Number of Occurrences:   1     Order Specific Question:   Diet:     Answer:   Diabetic [3]     Order Specific Question:   Diet:     Answer:   Cardiac [6]     ACTIVITY  As tolerated.  Weight bearing as tolerated    CONSULTATIONS  Cardiology   Neurology     PROCEDURES    \"Cardiac catheterization report     Procedure date: 10/24/2019     Referring physician: Dr. Cesario Westfall     Pre-operative Diagnosis:  1.  New onset heart failure with severe re-reduced left ventricle systolic function unclear etiology  2.  Atrial fibrillation     Post-operative Diagnosis:   1.  Single-vessel coronary artery disease with 70% stenosis in mid left anterior descending artery (IFR 0.88) and 90% distal left anterior descending artery stenosis  2.  Status post stenting of the mid left anterior descending artery with 3 x 15 mm Xience Deepika drug eluting stent and 2.5 x 15 mm Xience Deepika drug eluting stent to the distal LAD  3.  Cardiomyopathy likely predominantly from nonischemic etiology, probably tachycardia induced  4.  Atrial fibrillation     Procedure:  1.  Left heart catheterization without left ventriculography  2.  Selective coronary angiography  3.  Instantaneous wave free ratio analysis of the left anterior descending artery and the left circumflex artery  4.  Percutaneous coronary intervention of the left anterior descending artery  5.  Supervision of moderate conscious sedation     Complications: None     Description of Procedure:  After informed consent was obtained, the patient was brought to cardiac catheterization " laboratory in fasting state.   Chavez test was carried out on the right hand and was found to be negative.  Right wrist and right groin were then prepped and drapped in sterile fashion.  Versed and fentanyl were used for conscious sedation.  Lidocaine 2% was used to anesthetize the area.  A 6 Occitan Terumo sheath was then placed in the right radial artery using Seldinger technique.  A 2.5 mg of verapamil, 100 microgram of nitroglycerine and 3000 units of heparin were administered into the radial sheath.     Selective angiography of the right coronary artery was performed in multiple views using 5 Occitan TIG catheter.   Selective coronary angiography of the left coronary artery was then performed using the same catheter.   The catheter was then advanced into the left ventricle.    Left ventricular pressure was then recorded.   Left heart pullback was then performed.     After reviewing diagnostic angiography, we decided to further interrogate the stenosis in the left circumflex (LCX) and left anterior descending artery (LAD) with instantaneous wave free ratio (IFR) analysis.  A 6 Occitan EBU 3.5 guide catheter was used.  Additional 5000 units of heparin was administered via the guide catheter.  After appropriate calibration, an IFR wire was advanced pass the stenosis into the distal portion of the LCX then LAD.  The iFR of the LCX was above 0.9 consistent with non-flow-limiting disease.  The IFR of the mid LAD lesion was however at 0.88 indicating flow-limiting stenosis.  She also had another 90% stenosis in the distal LAD, we therefore proceeded with percutaneous coronary intervention.  Both mid and distal LAD lesions were dilated with a 2.5x15 mm balloon.  A 2.5 x 15 mm and 3 x 15 mm Xience Deepika drug eluting stents were deployed in the distal and mid LAD respectively and post dilated with non-compliance balloon.  Subsequent angiography showed no significant residual stenosis with VAMSHI III flow.   The guide wire was  subsequently removed and final angiography was performed.  It confirmed good result. The guide catheter was then removed.   The radial sheath was subsequently removed.  A Terumo TR wrist band was placed.  Small to moderate sized hematoma was noted on the ulnar aspect of forearm of both the puncture site.  Manual compression was applied followed by placement of pressure dressing.  Patient is no signs of reduced circulation in her right hand and denies any significant numbness on discomfort in the hand.    Pulse oximetry also continues to show normal waveform.    The patient was then given a loading dose of Effient.  The patient tolerated procedure well and left cardiac catheterization laboratory in stable condition.  Of note additional 2000 units of heparin was also given during procedure due to ACT below optimal range.     I supervised monitoring the patient under moderate conscious sedation beginning at 9:03 AM until the end of the case at 10:26 AM.     Findings:  There is no gradient across aortic valve.  Left ventricular end-diastolic pressure was 29 mmHg.     Left ventriculography was not performed to minimize contrast load      Left systolic function is known to be severely depressed.     Single-vessel coronary artery disease     This is right dominant system.     Left main is large and without flow limiting disease.  It bifurcated into left anterior descending and left circumflex artery.      Left anterior descending artery is large caliber vessel and extends to the apex.  It gives rises to small first and medium sized second diagonal branches.  There was eccentric 70% stenosis in the mid portion of the left anterior descending artery (LAD) and 90% focal stenosis distally at the beginning of the case.  The antegrade flow is normal.     Left circumflex artery is large in caliber.   It gives rise to one small and one large obtuse marginal branches.  There is 50 to 60% stenosis in the mid portion of the left  "circumflex artery (LCX).  The IFR was consistent with non-flow-limiting disease.  The antegrade flow is normal.     Right coronary artery (RCA) is large caliber. It gives rise to 1  medium sized acute marginal branch, posterior descending artery and posterolateral branch.  There is no significant disease in the right coronary artery or its major branches.  The antegrade flow is normal.     After intervention, there was 10% residual stenosis in LAD with VAMSHI III flow \"    LABORATORY  Lab Results   Component Value Date    SODIUM 137 10/26/2019    POTASSIUM 3.9 10/26/2019    CHLORIDE 103 10/26/2019    CO2 23 10/26/2019    GLUCOSE 133 (H) 10/26/2019    BUN 20 10/26/2019    CREATININE 1.40 10/26/2019        Lab Results   Component Value Date    WBC 5.2 10/25/2019    HEMOGLOBIN 11.9 (L) 10/25/2019    HEMATOCRIT 37.9 10/25/2019    PLATELETCT 254 10/25/2019      Total time of the discharge process exceeds 37 minutes.  "

## 2019-10-26 NOTE — PROGRESS NOTES
Prior authorization for Eliquis was not done.  I discussed with care coordination who gave me a number to call [phone].  I called this number and got an automatic reply to call patient insurance for prior authorization.        Patient will be given 30-day supply sample for rivaroxaban instead.

## 2019-10-26 NOTE — DISCHARGE PLANNING
Per information received from [facility] pharmacy, the physician needs to contact [employer] via online portal or phone number to complete prior auth. Dr. Orozco states that he will do this for prior auth for Eliquis for pt.  provided Dr. Orozco with the information.

## 2019-10-26 NOTE — PROGRESS NOTES
Received report updates from Day FABIOLA Feldman. Patient in bed, no signs of distress. Bed locked and low position. Call light within reach.

## 2019-10-26 NOTE — FLOWSHEET NOTE
10/26/19 0832   Oxygen   Pulse Oximetry 99 %   O2 (LPM) 6   ETCO2 (mmHg) 21   O2 Daily Delivery Respiratory  Simple Mask   Events   Conscious Sedation Smart Text Completed? Yes       Tolerated Well, SONIA.

## 2019-10-26 NOTE — CARE PLAN
Problem: Communication  Goal: The ability to communicate needs accurately and effectively will improve  Outcome: PROGRESSING AS EXPECTED  Intervention: Educate patient and significant other/support system about the plan of care, procedures, treatments, medications and allow for questions  Note:   Patient and her sister addresses any questions or concerns that they have with the patient's care, treatment, and medications.     Problem: Infection  Goal: Will remain free from infection  Outcome: PROGRESSING AS EXPECTED  Intervention: Assess signs and symptoms of infection  Note:   Patient does not have any signs or symptoms of an infection.

## 2019-10-26 NOTE — DISCHARGE PLANNING
[facility]    Received request and in process of processing Meds-To-Beds medication clopidogrel. Please contact [phone] for any questions, updates, or when ready for bedside delivery.     Radha Vasquez, PharmD

## 2019-10-26 NOTE — DISCHARGE PLANNING
[facility]     Meds-To-Beds medication clopidogrel delivered to patient at bedside.     Radha Vasquez, PharmD

## 2019-10-27 LAB — GLUCOSE BLD-MCNC: 209 MG/DL (ref 65–99)

## 2019-10-29 ENCOUNTER — TELEPHONE (OUTPATIENT)
Dept: CARDIOLOGY | Facility: MEDICAL CENTER | Age: 54
End: 2019-10-29

## 2019-10-29 NOTE — DISCHARGE PLANNING
MAT received call today from pt. She states that she was able to get her Xarelto with no issues, but she is having trouble getting set up with a PCP, and is being told there is no availability in [place] until next June.   MAT googled clinics for PCP's and was able to find one in [place] where pt lives. It is the [facility]([phone]) and it is located at [address] [place] [address] in [place], [place]. MAT spoke with Katy and she scheduled pt an appt for tomorrow at 1430 with Dr. Sandoval.   MAT called pt back to let her know and she states that she is in agreement with this and happy that it is located in [place]. MAT provided pt with all the appt details and discussed the importance of talking to the MD about her Xarelto right away so that insurance can authorize before she needs a refill. Pt states that she understands.

## 2019-10-29 NOTE — TELEPHONE ENCOUNTER
I faxed over a medical release of information to [facility] for records on patient's hospitalization.     Fax CONF

## 2019-10-31 ENCOUNTER — TELEPHONE (OUTPATIENT)
Dept: VASCULAR LAB | Facility: MEDICAL CENTER | Age: 54
End: 2019-10-31

## 2019-10-31 NOTE — TELEPHONE ENCOUNTER
attempted pt regarding referral to anticoagulation from Dr. Orozco for Xarelto. Unable to reach pt and no VM set up. Will try again at later time.     Insurance: inna  PCP: none   Location: [facility]       Shama DollD

## 2019-11-01 ENCOUNTER — OFFICE VISIT (OUTPATIENT)
Dept: CARDIOLOGY | Facility: MEDICAL CENTER | Age: 54
End: 2019-11-01
Payer: COMMERCIAL

## 2019-11-01 VITALS
HEIGHT: 62 IN | OXYGEN SATURATION: 96 % | SYSTOLIC BLOOD PRESSURE: 160 MMHG | DIASTOLIC BLOOD PRESSURE: 90 MMHG | BODY MASS INDEX: 36.36 KG/M2 | HEART RATE: 118 BPM | WEIGHT: 197.6 LBS

## 2019-11-01 DIAGNOSIS — I48.19 PERSISTENT ATRIAL FIBRILLATION (HCC): ICD-10-CM

## 2019-11-01 DIAGNOSIS — I50.9 HEART FAILURE, NYHA CLASS 2 (HCC): ICD-10-CM

## 2019-11-01 DIAGNOSIS — R06.02 SOB (SHORTNESS OF BREATH): ICD-10-CM

## 2019-11-01 DIAGNOSIS — I65.23 BILATERAL CAROTID ARTERY STENOSIS: ICD-10-CM

## 2019-11-01 DIAGNOSIS — I10 HTN (HYPERTENSION), MALIGNANT: ICD-10-CM

## 2019-11-01 DIAGNOSIS — E66.09 CLASS 2 OBESITY DUE TO EXCESS CALORIES WITHOUT SERIOUS COMORBIDITY WITH BODY MASS INDEX (BMI) OF 36.0 TO 36.9 IN ADULT: ICD-10-CM

## 2019-11-01 DIAGNOSIS — E11.65 TYPE 2 DIABETES MELLITUS WITH HYPERGLYCEMIA, WITHOUT LONG-TERM CURRENT USE OF INSULIN (HCC): ICD-10-CM

## 2019-11-01 DIAGNOSIS — I25.10 CORONARY ARTERY DISEASE INVOLVING NATIVE CORONARY ARTERY OF NATIVE HEART WITHOUT ANGINA PECTORIS: ICD-10-CM

## 2019-11-01 DIAGNOSIS — I50.20 ACC/AHA STAGE C SYSTOLIC HEART FAILURE (HCC): ICD-10-CM

## 2019-11-01 DIAGNOSIS — Z86.73 H/O ISCHEMIC LEFT MCA STROKE: ICD-10-CM

## 2019-11-01 DIAGNOSIS — I25.5 ISCHEMIC CARDIOMYOPATHY: ICD-10-CM

## 2019-11-01 PROCEDURE — 94618 PULMONARY STRESS TESTING: CPT | Performed by: NURSE PRACTITIONER

## 2019-11-01 PROCEDURE — 99214 OFFICE O/P EST MOD 30 MIN: CPT | Mod: 25 | Performed by: NURSE PRACTITIONER

## 2019-11-01 RX ORDER — DIGOXIN 250 MCG
250 TABLET ORAL DAILY
Qty: 30 TAB | Refills: 11 | Status: SHIPPED | OUTPATIENT
Start: 2019-11-01

## 2019-11-01 RX ORDER — SPIRONOLACTONE 25 MG/1
25 TABLET ORAL DAILY
Qty: 30 TAB | Refills: 11 | Status: SHIPPED | OUTPATIENT
Start: 2019-11-01

## 2019-11-01 RX ORDER — LOSARTAN POTASSIUM 50 MG/1
50 TABLET ORAL DAILY
Qty: 30 TAB | Refills: 11 | Status: ON HOLD | OUTPATIENT
Start: 2019-11-01 | End: 2019-11-21 | Stop reason: SDUPTHER

## 2019-11-01 ASSESSMENT — MINNESOTA LIVING WITH HEART FAILURE QUESTIONNAIRE (MLHF)
SWELLING IN ANKLES OR LEGS: 2
MAKING YOU SHORT OF BREATH: 3
GIVING YOU SIDE EFFECTS FROM TREATMENTS: 3
MAKING YOU STAY IN A HOSPITAL: 4
LOSS OF SELF CONTROL IN YOUR LIFE: 3
DIFFICULTY SOCIALIZING WITH FAMILY OR FRIENDS: 3
DIFFICULTY TO CONCENTRATE OR REMEMBERING THINGS: 2
DIFFICULTY SLEEPING WELL AT NIGHT: 3
WORKING AROUND THE HOUSE OR YARD DIFFICULT: 3
HAVING TO SIT OR LIE DOWN DURING THE DAY: 3
DIFFICULTY WITH SEXUAL ACTIVITIES: 2
FEELING LIKE A BURDEN TO FAMILY AND FRIENDS: 3
DIFFICULTY WORKING TO EARN A LIVING: 3
WALKING ABOUT OR CLIMBING STAIRS DIFFICULT: 3
MAKING YOU FEEL DEPRESSED: 2
TIRED, FATIGUED OR LOW ON ENERGY: 3
MAKING YOU WORRY: 3
DIFFICULTY WITH RECREATIONAL PASTIMES, SPORTS, HOBBIES: 3
DIFFICULTY GOING AWAY FROM HOME: 3
EATING LESS FOODS YOU LIKE: 3
COSTING YOU MONEY FOR MEDICAL CARE: 3
TOTAL_SCORE: 60

## 2019-11-01 ASSESSMENT — ENCOUNTER SYMPTOMS
CLAUDICATION: 0
PALPITATIONS: 0
FEVER: 0
DIZZINESS: 0
MYALGIAS: 0
COUGH: 0
PND: 0
ORTHOPNEA: 0
ABDOMINAL PAIN: 0
SHORTNESS OF BREATH: 1

## 2019-11-01 ASSESSMENT — 6 MINUTE WALK TEST (6MWT): TOTAL DISTANCE WALKED (METERS): 109.73

## 2019-11-01 NOTE — PROGRESS NOTES
Chief Complaint   Patient presents with   • Congestive Heart Failure       Subjective:   Judith Vallecillo is a 54 y.o. female who presents today for follow up on her heart failure, A. fib, CAD.      New patient of the office, she had a hospitalization from 10/18/2019 through 10/20/2019.  [facility]  She is found to be in A. fib with RVR, biventricular heart failure.  She was going to have a cardioversion, but her SONIA showed an STELLA thrombus.  Patient also had an angiogram which she received 2 drug-eluting stents to the LAD.  While she was in the hospital, she also had an acute stroke.    For her symptoms, she reports fatigue and dyspnea with exertion along with some swelling that comes and goes.  She denies chest pain, palpitations, orthopnea, PND or dizziness/lightheadedness.    She reports her home weights are ranging between 194-197 pounds.    She is a .  She is planning to go back on lighter duty on Monday.    Initial 6 minute walk test, patient was able to complete 109 m during her 6 minute walk test.  Her O2 saturation at baseline was 96 % and at the end of the test, the O2 saturation was 97 %.  She reported level 5 of dyspnea on Randy scale.  Patient was only able to walk for 2 minutes 12 seconds and stopped due to shortness of breath.    MLWHF score 60    Additonally, patient has the following medical problems:    -Diabetes: Previously not on treatment, currently taking glipizide    -Hypertension    -Hyperlipidemia    -Smoker, quit during her hospitalization    -History of a stroke    Past Medical History:   Diagnosis Date   • Atrial fibrillation ([facility])    • CAD (coronary artery disease)    • CHF (congestive heart failure) ([facility])    • Diabetes ([facility])    • Hyperlipidemia    • Hypertension    • Ischemic cardiomyopathy    • Pneumonia 1975    as a child   • Stroke ([facility]) 2011    right-sided weakness at that time-resolved     Past Surgical  History:   Procedure Laterality Date   • ZZZ CARDIAC CATH  10/24/2019    KAYCE x2 to mid and distal LAD   • RECOVERY  2011    Performed by SURGERY, RECOVERYONLY at [facility]     Family History   Problem Relation Age of Onset   • Heart Disease Mother         Stents   • Heart Disease Father    • Other Father         Pancreatitis   • Hypertension Sister    • Hypertension Brother    • Hypertension Sister    • GI Disease Brother         Crohns   • Hypertension Brother      Social History     Socioeconomic History   • Marital status: Single     Spouse name: Not on file   • Number of children: Not on file   • Years of education: Not on file   • Highest education level: Not on file   Occupational History   • Not on file   Social Needs   • Financial resource strain: Not on file   • Food insecurity:     Worry: Not on file     Inability: Not on file   • Transportation needs:     Medical: Not on file     Non-medical: Not on file   Tobacco Use   • Smoking status: Former Smoker     Packs/day: 0.50     Years: 20.00     Pack years: 10.00     Types: Cigarettes     Last attempt to quit: 10/18/2019     Years since quittin.0   • Smokeless tobacco: Never Used   • Tobacco comment: Quit smoking when she went into the hospital   Substance and Sexual Activity   • Alcohol use: Not Currently     Frequency: Never     Comment: very rare drink of a beer or wine   • Drug use: No   • Sexual activity: Not on file   Lifestyle   • Physical activity:     Days per week: Not on file     Minutes per session: Not on file   • Stress: Not on file   Relationships   • Social connections:     Talks on phone: Not on file     Gets together: Not on file     Attends [employer] service: Not on file     Active member of club or organization: Not on file     Attends meetings of clubs or organizations: Not on file     Relationship status: Not on file   • Intimate partner violence:     Fear of current or ex partner: Not on file     Emotionally  abused: Not on file     Physically abused: Not on file     Forced sexual activity: Not on file   Other Topics Concern   • Not on file   Social History Narrative   • Not on file     Allergies   Allergen Reactions   • Bee Anaphylaxis     Swelling and airway closure.   • Latex Itching and Swelling     Swelling, redness, itching.   • Strawberry Itching     itching     Outpatient Encounter Medications as of 11/1/2019   Medication Sig Dispense Refill   • digoxin (LANOXIN) 250 MCG Tab Take 1 Tab by mouth every day at 6 PM. 30 Tab 11   • losartan (COZAAR) 50 MG Tab Take 1 Tab by mouth every day. 30 Tab 11   • spironolactone (ALDACTONE) 25 MG Tab Take 1 Tab by mouth every day. 30 Tab 11   • clopidogrel (PLAVIX) 75 MG Tab Take 1 Tab by mouth every day. 30 Tab 11   • aspirin (ASA) 81 MG Chew Tab chewable tablet Take 1 Tab by mouth every day. 30 Tab 0   • rivaroxaban (XARELTO) 20 MG Tab tablet Take 1 Tab by mouth with dinner. 30 Tab 0   • glipiZIDE (GLUCOTROL) 5 MG Tab Take 0.5 Tabs by mouth 2 times a day. 30 Tab 0   • atorvastatin (LIPITOR) 40 MG Tab Take 1 Tab by mouth every evening. 90 Tab 3   • metoprolol (TOPROL-XL) 200 MG XL tablet Take 1 Tab by mouth every day. 30 Tab 0   • [DISCONTINUED] losartan (COZAAR) 25 MG Tab Take 1 Tab by mouth every day. 30 Tab 0   • [DISCONTINUED] digoxin (LANOXIN) 125 MCG Tab Take 1 Tab by mouth every day at 6 PM. 30 Tab 0   • [DISCONTINUED] spironolactone (ALDACTONE) 25 MG Tab Take 0.5 Tabs by mouth every day. 30 Tab 0     No facility-administered encounter medications on file as of 11/1/2019.      Review of Systems   Constitutional: Positive for malaise/fatigue. Negative for fever.   Respiratory: Positive for shortness of breath. Negative for cough.    Cardiovascular: Positive for leg swelling (at times). Negative for chest pain, palpitations, orthopnea, claudication and PND.   Gastrointestinal: Negative for abdominal pain.   Musculoskeletal: Negative for myalgias.   Neurological: Negative  "for dizziness.   All other systems reviewed and are negative.       Objective:   /90 (BP Location: Left arm, Patient Position: Sitting, BP Cuff Size: Adult)   Pulse (!) 118   Ht 1.575 m (5' 2\")   Wt 89.6 kg (197 lb 9.6 oz)   SpO2 96%   BMI 36.14 kg/m²     Physical Exam   Constitutional: She is oriented to person, place, and time. She appears well-developed and well-nourished.   HENT:   Head: Normocephalic and atraumatic.   Eyes: Pupils are equal, round, and reactive to light. EOM are normal.   Neck: Normal range of motion. Neck supple. No JVD present.   Cardiovascular: Normal rate and normal heart sounds. An irregularly irregular rhythm present.   Pulmonary/Chest: Effort normal and breath sounds normal. No respiratory distress. She has no wheezes. She has no rales.   Abdominal: Soft. Bowel sounds are normal.   Musculoskeletal: She exhibits no edema.   Neurological: She is alert and oriented to person, place, and time.   Skin: Skin is warm and dry.   Psychiatric: She has a normal mood and affect. Her behavior is normal.   Vitals reviewed.    Lab Results   Component Value Date/Time    CHOLSTRLTOT 117 10/20/2019 02:59 AM    LDL 62 10/20/2019 02:59 AM    HDL 23 (A) 10/20/2019 02:59 AM    TRIGLYCERIDE 162 (H) 10/20/2019 02:59 AM       Lab Results   Component Value Date/Time    SODIUM 137 10/26/2019 03:09 AM    POTASSIUM 3.9 10/26/2019 03:09 AM    CHLORIDE 103 10/26/2019 03:09 AM    CO2 23 10/26/2019 03:09 AM    GLUCOSE 133 (H) 10/26/2019 03:09 AM    BUN 20 10/26/2019 03:09 AM    CREATININE 1.40 10/26/2019 03:09 AM     Lab Results   Component Value Date/Time    ALKPHOSPHAT 99 10/22/2019 11:24 AM    ASTSGOT 17 10/22/2019 11:24 AM    ALTSGPT 12 10/22/2019 11:24 AM    TBILIRUBIN 0.9 10/22/2019 11:24 AM      Transthoracic Echo Report 10/19/2019  Four chamber dilation. Biventricular failure. LV systolic function is   severely reduced. LVEF 20% with global hypokinesis. No significant   valuvlar disease visualized on " this study. RVSP estimated at 40 mmHg.   IVC is dilated and fails to collapse with inspiration suggesting   elevated central venous pressures. Compared to the images of the study done on 06/01/2011 there has been a severe decline in biventricular function.    Carotid Duplex Report 10/19/2019   Mild bilateral internal carotid artery stenosis (<50%).    Thyroid nodules measuring less than 1 cm.    CT calcium score 10/23/2019  FINDINGS:    Coronary calcification:  LMA - 0.0  LCX - 117  LAD - 246  RCA - 51    Total Calcium Score: 414    Percentile: Calcium score is worse than the 90th percentile for the patient's age and sex.    Other findings:  Heart: Moderate enlargement especially of the left ventricle.  Lungs: Incompletely visualized right pleural effusion and basilar consolidation.  Mediastinum: Normal.  Central pulmonary arteries are enlarged with rapid peripheral tapering  Small pericardial effusion  Upper abdomen: Normal.    Breathing motion artifact        Heart Cath 10/24/2019  Post-operative Diagnosis:   1.  Single-vessel coronary artery disease with 70% stenosis in mid left anterior descending artery (IFR 0.88) and 90% distal left anterior descending artery stenosis  2.  Status post stenting of the mid left anterior descending artery with 3 x 15 mm Xience Deepika drug eluting stent and 2.5 x 15 mm Xience Deepika drug eluting stent to the distal LAD  3.  Cardiomyopathy likely predominantly from nonischemic etiology, probably tachycardia induced  4.  Atrial fibrillation  Assessment:     1. ACC/AHA stage C systolic heart failure (HCC)  digoxin (LANOXIN) 250 MCG Tab    losartan (COZAAR) 50 MG Tab    spironolactone (ALDACTONE) 25 MG Tab    Basic Metabolic Panel   2. Heart failure, NYHA class 2 (HCC)  digoxin (LANOXIN) 250 MCG Tab    losartan (COZAAR) 50 MG Tab    spironolactone (ALDACTONE) 25 MG Tab    Basic Metabolic Panel   3. Ischemic cardiomyopathy     4. Coronary artery disease involving native coronary artery  of native heart without angina pectoris     5. Persistent atrial fibrillation  digoxin (LANOXIN) 250 MCG Tab    Basic Metabolic Panel   6. HTN (hypertension), malignant  spironolactone (ALDACTONE) 25 MG Tab    Basic Metabolic Panel   7. Class 2 obesity due to excess calories without serious comorbidity with body mass index (BMI) of 36.0 to 36.9 in adult     8. Type 2 diabetes mellitus with hyperglycemia, without long-term current use of insulin (HCC)     9. Bilateral carotid artery stenosis     10. H/O ischemic left MCA stroke         Medical Decision Making:  Today's Assessment / Status / Plan:   1. HFrEF, Stage C, Class 2, LVEF 2: Based on physical examination findings, patient is euvolemic. No JVD, lungs are clear to auscultation, no pitting edema in bilateral lower extremities, no ascites.  -Increase losartan to 50 mg daily  -Increase spironolactone to 25 mg daily  -Increase digoxin to 250 mcg daily  -Continue Toprol- mg daily  -Obtain a BMP in 1 week  -Reinforced s/sx of worsening heart failure with patient and weight monitoring. Pt verbalizes understanding. Pt to call office or RTC if present.   -Reinforced education to Maintain adequate hydration and 2 gram sodium diet.    2.  CAD, a/p KAYCE x2 to the LAD: Last LDL 62 on 10/20/2019  -Continue aspirin 81 mg daily, but discontinue on 11/24/2019  -Continue clopidogrel 75 mg daily  -Continue atorvastatin 40 mg daily  -Encouraged continued smoking cessation    3.  Atrial fibrillation: Patient's rates continue to be elevated  -Increase digoxin to 250 mcg daily  -Continue Xarelto 20 mg daily  -Discussed bleeding precautions    4.  Hypertension: BP is elevated in office today, BP on recheck 180/78  -Increase spironolactone losartan per above and continue other recommendations    5.  Stroke:  -Continue statin, aspirin and Plavix per above  -Patient has mild bilateral carotid stenosis  -FU with PCP    6.  Diabetes:  -Encourage patient to follow-up with  PCP  -Continue glipizide 2.5 mg twice a day    7.  Obesity: BMI 36.14  -Encouraged weight loss    FU in clinic in 2 weeks with HF MD. Sooner if needed.    Patient verbalizes understanding and agrees with the plan of care.     Collaborating MD: Edwin Gonzalez MD

## 2019-11-01 NOTE — PATIENT INSTRUCTIONS
Stop Aspirin on 11/24/2019  Increase Digoxin to 250 mcg daily  Increase Losartan to 50 mg daily  Increase Spironolactone to 25 mg daily  Labs in 1 week

## 2019-11-06 ENCOUNTER — TELEPHONE (OUTPATIENT)
Dept: VASCULAR LAB | Facility: MEDICAL CENTER | Age: 54
End: 2019-11-06

## 2019-11-06 NOTE — TELEPHONE ENCOUNTER
Called and left msg for pt to call back to establish care regarding anticoagulation referral for Xarelto due to Afib from Dr. Orozco on 10/26/19    Insurance: [employer]  PCP: non-[facility] - pt lives in [place]  Locations to be seen: [facility] at [phone], fax [phone]    Chapis Jerome, PharmD

## 2019-11-07 ENCOUNTER — TELEPHONE (OUTPATIENT)
Dept: CARDIOLOGY | Facility: MEDICAL CENTER | Age: 54
End: 2019-11-07

## 2019-11-07 NOTE — TELEPHONE ENCOUNTER
Returned call. Pt is at work. Day 3 of symptoms today. Little tender on 11/1 when seen by MALI but no numbness as the last 3 days. No redness. No discharge at insertion site. Lot less tender than initially. Little swelling below the wrist. Pts PCO is Dr. Sandoval in [place]. Pt lives in [place]. PCP visit today or Urgent Care advised.  She will call back if unable to be seen. Heating pad and elevation discussed, her job is too physically demanding for that and this is causing it to be very physically challenging to do her job.

## 2019-11-07 NOTE — TELEPHONE ENCOUNTER
MALI/mary    Pt calling to report numbness, tingling, burning feeling in right hand for last couple of days following cath on 10/24.  Symptoms not getting worse, just staying steady.  Please call Judith  (work#)

## 2019-11-11 ENCOUNTER — TELEPHONE (OUTPATIENT)
Dept: CARDIOLOGY | Facility: MEDICAL CENTER | Age: 54
End: 2019-11-11

## 2019-11-12 ENCOUNTER — OFFICE VISIT (OUTPATIENT)
Dept: CARDIOLOGY | Facility: MEDICAL CENTER | Age: 54
End: 2019-11-12
Payer: COMMERCIAL

## 2019-11-12 ENCOUNTER — TELEPHONE (OUTPATIENT)
Dept: CARDIOLOGY | Facility: MEDICAL CENTER | Age: 54
End: 2019-11-12

## 2019-11-12 ENCOUNTER — HOSPITAL ENCOUNTER (OUTPATIENT)
Dept: LAB | Facility: MEDICAL CENTER | Age: 54
End: 2019-11-12
Attending: NURSE PRACTITIONER
Payer: COMMERCIAL

## 2019-11-12 VITALS
BODY MASS INDEX: 35.15 KG/M2 | OXYGEN SATURATION: 97 % | WEIGHT: 191 LBS | DIASTOLIC BLOOD PRESSURE: 82 MMHG | HEIGHT: 62 IN | HEART RATE: 100 BPM | SYSTOLIC BLOOD PRESSURE: 138 MMHG

## 2019-11-12 DIAGNOSIS — I65.23 BILATERAL CAROTID ARTERY STENOSIS: ICD-10-CM

## 2019-11-12 DIAGNOSIS — I50.9 HEART FAILURE, NYHA CLASS 2 (HCC): ICD-10-CM

## 2019-11-12 DIAGNOSIS — I25.10 CORONARY ARTERY DISEASE INVOLVING NATIVE CORONARY ARTERY OF NATIVE HEART WITHOUT ANGINA PECTORIS: ICD-10-CM

## 2019-11-12 DIAGNOSIS — I50.41 ACUTE COMBINED SYSTOLIC AND DIASTOLIC HEART FAILURE (HCC): ICD-10-CM

## 2019-11-12 DIAGNOSIS — I10 HTN (HYPERTENSION), MALIGNANT: ICD-10-CM

## 2019-11-12 DIAGNOSIS — I48.19 PERSISTENT ATRIAL FIBRILLATION (HCC): ICD-10-CM

## 2019-11-12 DIAGNOSIS — Z79.899 HIGH RISK MEDICATION USE: ICD-10-CM

## 2019-11-12 DIAGNOSIS — E11.65 TYPE 2 DIABETES MELLITUS WITH HYPERGLYCEMIA, WITHOUT LONG-TERM CURRENT USE OF INSULIN (HCC): ICD-10-CM

## 2019-11-12 DIAGNOSIS — F17.200 SMOKER: ICD-10-CM

## 2019-11-12 DIAGNOSIS — R20.0 NUMBNESS AND TINGLING OF RIGHT HAND: ICD-10-CM

## 2019-11-12 DIAGNOSIS — R20.2 NUMBNESS AND TINGLING OF RIGHT HAND: ICD-10-CM

## 2019-11-12 DIAGNOSIS — E66.09 CLASS 2 OBESITY DUE TO EXCESS CALORIES WITHOUT SERIOUS COMORBIDITY WITH BODY MASS INDEX (BMI) OF 36.0 TO 36.9 IN ADULT: ICD-10-CM

## 2019-11-12 DIAGNOSIS — I50.20 ACC/AHA STAGE C SYSTOLIC HEART FAILURE (HCC): ICD-10-CM

## 2019-11-12 DIAGNOSIS — I63.512 CEREBRAL INFARCTION DUE TO OCCLUSION OF LEFT MIDDLE CEREBRAL ARTERY (HCC): ICD-10-CM

## 2019-11-12 LAB
ANION GAP SERPL CALC-SCNC: 8 MMOL/L (ref 0–11.9)
BUN SERPL-MCNC: 15 MG/DL (ref 8–22)
CALCIUM SERPL-MCNC: 9.2 MG/DL (ref 8.5–10.5)
CHLORIDE SERPL-SCNC: 108 MMOL/L (ref 96–112)
CO2 SERPL-SCNC: 26 MMOL/L (ref 20–33)
CREAT SERPL-MCNC: 1.18 MG/DL (ref 0.5–1.4)
GLUCOSE SERPL-MCNC: 133 MG/DL (ref 65–99)
POTASSIUM SERPL-SCNC: 3.7 MMOL/L (ref 3.6–5.5)
SODIUM SERPL-SCNC: 142 MMOL/L (ref 135–145)

## 2019-11-12 PROCEDURE — 80048 BASIC METABOLIC PNL TOTAL CA: CPT

## 2019-11-12 PROCEDURE — 36415 COLL VENOUS BLD VENIPUNCTURE: CPT

## 2019-11-12 PROCEDURE — 99406 BEHAV CHNG SMOKING 3-10 MIN: CPT | Performed by: INTERNAL MEDICINE

## 2019-11-12 PROCEDURE — 99215 OFFICE O/P EST HI 40 MIN: CPT | Mod: 25 | Performed by: INTERNAL MEDICINE

## 2019-11-12 ASSESSMENT — ENCOUNTER SYMPTOMS
ORTHOPNEA: 0
RESPIRATORY NEGATIVE: 1
LOSS OF CONSCIOUSNESS: 0
SORE THROAT: 0
STRIDOR: 0
TINGLING: 1
COUGH: 0
PND: 0
PALPITATIONS: 0
CARDIOVASCULAR NEGATIVE: 1
EYES NEGATIVE: 1
BRUISES/BLEEDS EASILY: 1
CONSTITUTIONAL NEGATIVE: 1
CLAUDICATION: 0
CHILLS: 0
SPUTUM PRODUCTION: 0
GASTROINTESTINAL NEGATIVE: 1
FOCAL WEAKNESS: 1
FEVER: 0
SHORTNESS OF BREATH: 0
HEMOPTYSIS: 0
WEAKNESS: 0
WHEEZING: 0
DIZZINESS: 0

## 2019-11-12 NOTE — TELEPHONE ENCOUNTER
Patient is scheduled on 11-19-19 for a SONIA/CV with Dr. Calhoun with conscious sedation. Patient was told to hold glipezide am day of procedure and to check in at 7:00 for a 9:00 procedure. H&P was done on 11-12-19 by Dr. Westfall.

## 2019-11-12 NOTE — PROGRESS NOTES
Chief Complaint   Patient presents with   • Congestive Heart Failure       Subjective:   Judith Vallecillo is a 54 y.o. female who presents today as a follow-up for her heart failure with reduced ejection fraction from accommodation of ischemic cardiomyopathy as well as atrial fibrillation with rapid ventricular response.  Following her angiogram she was started on triple therapy.  She 3 days ago developed numbness and tingling over the right hand in the distribution of the fourth and fifth metacarpal.  She is also developed weakness there.  She has bruising and swelling over the ulnar aspect of the distal lower extremity.  In terms of her symptoms she is having improving shortness of breath.  She is going no longer smoking.  Her blood pressure is controlled.  Her heart rate is borderline controlled.  She can is on the higher dose digoxin.    Past Medical History:   Diagnosis Date   • Atrial fibrillation ([facility])    • CAD (coronary artery disease)    • CHF (congestive heart failure) ([facility])    • Diabetes ([facility])    • Hyperlipidemia    • Hypertension    • Ischemic cardiomyopathy    • Pneumonia 1975    as a child   • Stroke ([facility]) 2011    right-sided weakness at that time-resolved     Past Surgical History:   Procedure Laterality Date   • ZZZ CARDIAC CATH  10/24/2019    KAYCE x2 to mid and distal LAD   • RECOVERY  6/2/2011    Performed by SANDRA NEIL at [facility]     Family History   Problem Relation Age of Onset   • Heart Disease Mother         Stents   • Heart Disease Father    • Other Father         Pancreatitis   • Hypertension Sister    • Hypertension Brother    • Hypertension Sister    • GI Disease Brother         Crohns   • Hypertension Brother      Social History     Socioeconomic History   • Marital status: Single     Spouse name: Not on file   • Number of children: Not on file   • Years of education: Not on file   • Highest education level: Not on file   Occupational History   • Not  on file   Social Needs   • Financial resource strain: Not on file   • Food insecurity:     Worry: Not on file     Inability: Not on file   • Transportation needs:     Medical: Not on file     Non-medical: Not on file   Tobacco Use   • Smoking status: Former Smoker     Packs/day: 0.50     Years: 20.00     Pack years: 10.00     Types: Cigarettes     Last attempt to quit: 10/18/2019     Years since quittin.0   • Smokeless tobacco: Never Used   • Tobacco comment: Quit smoking when she went into the hospital   Substance and Sexual Activity   • Alcohol use: Not Currently     Frequency: Never     Comment: very rare drink of a beer or wine   • Drug use: No   • Sexual activity: Not on file   Lifestyle   • Physical activity:     Days per week: Not on file     Minutes per session: Not on file   • Stress: Not on file   Relationships   • Social connections:     Talks on phone: Not on file     Gets together: Not on file     Attends [employer] service: Not on file     Active member of club or organization: Not on file     Attends meetings of clubs or organizations: Not on file     Relationship status: Not on file   • Intimate partner violence:     Fear of current or ex partner: Not on file     Emotionally abused: Not on file     Physically abused: Not on file     Forced sexual activity: Not on file   Other Topics Concern   • Not on file   Social History Narrative   • Not on file     Allergies   Allergen Reactions   • Bee Anaphylaxis     Swelling and airway closure.   • Latex Itching and Swelling     Swelling, redness, itching.   • Strawberry Itching     itching     Outpatient Encounter Medications as of 2019   Medication Sig Dispense Refill   • digoxin (LANOXIN) 250 MCG Tab Take 1 Tab by mouth every day at 6 PM. 30 Tab 11   • losartan (COZAAR) 50 MG Tab Take 1 Tab by mouth every day. 30 Tab 11   • spironolactone (ALDACTONE) 25 MG Tab Take 1 Tab by mouth every day. 30 Tab 11   • clopidogrel (PLAVIX) 75 MG Tab Take 1 Tab by  "mouth every day. 30 Tab 11   • rivaroxaban (XARELTO) 20 MG Tab tablet Take 1 Tab by mouth with dinner. 30 Tab 0   • glipiZIDE (GLUCOTROL) 5 MG Tab Take 0.5 Tabs by mouth 2 times a day. 30 Tab 0   • atorvastatin (LIPITOR) 40 MG Tab Take 1 Tab by mouth every evening. 90 Tab 3   • metoprolol (TOPROL-XL) 200 MG XL tablet Take 1 Tab by mouth every day. 30 Tab 0   • [DISCONTINUED] aspirin (ASA) 81 MG Chew Tab chewable tablet Take 1 Tab by mouth every day. 30 Tab 0     No facility-administered encounter medications on file as of 11/12/2019.      Review of Systems   Constitutional: Negative.  Negative for chills, fever and malaise/fatigue.   HENT: Negative.  Negative for sore throat.    Eyes: Negative.    Respiratory: Negative.  Negative for cough, hemoptysis, sputum production, shortness of breath, wheezing and stridor.    Cardiovascular: Negative.  Negative for chest pain, palpitations, orthopnea, claudication, leg swelling and PND.   Gastrointestinal: Negative.    Genitourinary: Negative.    Musculoskeletal: Positive for joint pain.   Skin: Negative.    Neurological: Positive for tingling and focal weakness. Negative for dizziness, loss of consciousness and weakness.   Endo/Heme/Allergies: Bruises/bleeds easily.   All other systems reviewed and are negative.       Objective:   /82 (BP Location: Left arm, Patient Position: Sitting, BP Cuff Size: Adult)   Pulse 100   Ht 1.575 m (5' 2\")   Wt 86.6 kg (191 lb)   SpO2 97%   BMI 34.93 kg/m²     Physical Exam   Constitutional: She appears well-developed and well-nourished. No distress.   HENT:   Head: Normocephalic and atraumatic.   Right Ear: External ear normal.   Left Ear: External ear normal.   Nose: Nose normal.   Mouth/Throat: No oropharyngeal exudate.   Eyes: Pupils are equal, round, and reactive to light. Conjunctivae and EOM are normal. Right eye exhibits no discharge. Left eye exhibits no discharge. No scleral icterus.   Neck: Neck supple. No JVD present. "   Cardiovascular: Normal rate, regular rhythm and intact distal pulses. Exam reveals no gallop and no friction rub.   No murmur heard.  Pulmonary/Chest: Effort normal. No stridor. No respiratory distress. She has no wheezes. She has no rales. She exhibits no tenderness.   Abdominal: Soft. She exhibits no distension. There is no guarding.   Musculoskeletal: Normal range of motion.         General: No tenderness, deformity or edema.   Neurological: She is alert. She has normal reflexes. She displays normal reflexes. No cranial nerve deficit. She exhibits normal muscle tone. Coordination normal.   Skin: Skin is warm and dry. No rash noted. She is not diaphoretic. No erythema. No pallor.   Psychiatric: She has a normal mood and affect. Her behavior is normal. Judgment and thought content normal.   Nursing note and vitals reviewed.      Assessment:     1. Acute combined systolic and diastolic heart failure (HCC)     2. Persistent atrial fibrillation  CL-CARDIOVERSION   3. Bilateral carotid artery stenosis  CL-CARDIOVERSION   4. Cerebral infarction due to occlusion of left middle cerebral artery (HCC)  CL-CARDIOVERSION   5. Type 2 diabetes mellitus with hyperglycemia, without long-term current use of insulin ([facility])  DIGOXIN    Basic Metabolic Panel   6. Smoker  DIGOXIN    Basic Metabolic Panel   7. Coronary artery disease involving native coronary artery of native heart without angina pectoris  DIGOXIN    CBC WITH DIFFERENTIAL   8. Class 2 obesity due to excess calories without serious comorbidity with body mass index (BMI) of 36.0 to 36.9 in adult  Basic Metabolic Panel    CBC WITH DIFFERENTIAL   9. High risk medication use  DIGOXIN    Basic Metabolic Panel    CBC WITH DIFFERENTIAL    US-EXTREMITY ARTERY UPPER UNILAT W/WBI (COMBO)   10. Numbness and tingling of right hand  US-EXTREMITY ARTERY UPPER UNILAT W/WBI (COMBO)       Medical Decision Making:  Today's Assessment / Status / Plan:     54-year-old female with heart  failure with reduced ejection fraction per combination of ischemic cardiomyopathy and atrial fibrillation.  I congratulated her on being smoke-free.  We discussed smoking strategies for at least 5 minutes.  For her high risk medication usage we will check labs today.  For her right upper extremity numbness and swelling we will get a stat upper extremity ultrasound today.  For her atrial fibrillation we will schedule her for cardioversion.  We will see her back in 4 weeks.  I have asked her to stop her aspirin starting today.

## 2019-11-12 NOTE — TELEPHONE ENCOUNTER
Patient has scheduled her lab appointment before appointment w/ since patient drives in from [place], [place].

## 2019-11-13 ENCOUNTER — HOSPITAL ENCOUNTER (OUTPATIENT)
Facility: MEDICAL CENTER | Age: 54
End: 2019-11-13
Attending: INTERNAL MEDICINE | Admitting: INTERNAL MEDICINE
Payer: COMMERCIAL

## 2019-11-13 ENCOUNTER — TELEPHONE (OUTPATIENT)
Dept: VASCULAR LAB | Facility: MEDICAL CENTER | Age: 54
End: 2019-11-13

## 2019-11-13 NOTE — TELEPHONE ENCOUNTER
Called and left msg for pt to call back to establish care regarding anticoagulation referral for Xarelto due to Afib from Dr. Orozco on 10/26/19     Unable to establish care, sent letter and FYI to referring provider.     Insurance: [employer]  PCP: non-[facility] - pt lives in [place]  Locations to be seen: [facility] at [phone], fax [phone]     Chapis Jerome, PharmD

## 2019-11-13 NOTE — LETTER
November 13, 2019      Judith Vallecillo  [address]  [address] [place] [id number]      Dear Judith Vallecillo ,    We have been unsuccessful in our attempts to contact you regarding your Anticoagulation Service referral.  Anticoagulants are medications that can cause potential harmful side effects when not monitored properly. Without proper monitoring there is potential for serious, sometimes life-threatening bleeding problems or life-threatening blood clots or stroke could result.    Please contact our clinic so we may assist you.  We are open Monday-Friday 8 am until 5 pm.  You may reach our Service at [phone].    To monitor your anticoagulant effectively, we need to be able to communicate with you.  This is a requirement to be followed by our Service.  Please contact the clinic as soon as possible to establish care and provide your current contact information.  Thank you.        Sincerely,        Jann Ernandez, PharmD, [facility]  Pharmacy Clinical Supervisor  [facility]  Outpatient Anticoagulation Service

## 2019-11-17 ENCOUNTER — HOSPITAL ENCOUNTER (OUTPATIENT)
Facility: MEDICAL CENTER | Age: 54
End: 2019-11-17
Admitting: HOSPITALIST
Payer: COMMERCIAL

## 2019-11-17 ENCOUNTER — TELEPHONE (OUTPATIENT)
Dept: CARDIOLOGY | Facility: MEDICAL CENTER | Age: 54
End: 2019-11-17

## 2019-11-18 ENCOUNTER — HOSPITAL ENCOUNTER (OUTPATIENT)
Facility: MEDICAL CENTER | Age: 54
End: 2019-11-21
Attending: HOSPITALIST | Admitting: HOSPITALIST
Payer: COMMERCIAL

## 2019-11-18 DIAGNOSIS — I72.1: ICD-10-CM

## 2019-11-18 DIAGNOSIS — I50.9 HEART FAILURE, NYHA CLASS 2 (HCC): ICD-10-CM

## 2019-11-18 DIAGNOSIS — I50.20 ACC/AHA STAGE C SYSTOLIC HEART FAILURE (HCC): ICD-10-CM

## 2019-11-18 LAB
ALBUMIN SERPL BCP-MCNC: 3.5 G/DL (ref 3.2–4.9)
ALBUMIN/GLOB SERPL: 1.3 G/DL
ALP SERPL-CCNC: 108 U/L (ref 30–99)
ALT SERPL-CCNC: 12 U/L (ref 2–50)
ANION GAP SERPL CALC-SCNC: 8 MMOL/L (ref 0–11.9)
APTT PPP: 38.3 SEC (ref 24.7–36)
AST SERPL-CCNC: 15 U/L (ref 12–45)
BASOPHILS # BLD AUTO: 1.3 % (ref 0–1.8)
BASOPHILS # BLD: 0.07 K/UL (ref 0–0.12)
BILIRUB SERPL-MCNC: 0.9 MG/DL (ref 0.1–1.5)
BUN SERPL-MCNC: 14 MG/DL (ref 8–22)
CALCIUM SERPL-MCNC: 8.8 MG/DL (ref 8.5–10.5)
CHLORIDE SERPL-SCNC: 110 MMOL/L (ref 96–112)
CO2 SERPL-SCNC: 23 MMOL/L (ref 20–33)
CREAT SERPL-MCNC: 0.93 MG/DL (ref 0.5–1.4)
EOSINOPHIL # BLD AUTO: 0.14 K/UL (ref 0–0.51)
EOSINOPHIL NFR BLD: 2.5 % (ref 0–6.9)
ERYTHROCYTE [DISTWIDTH] IN BLOOD BY AUTOMATED COUNT: 55.5 FL (ref 35.9–50)
GLOBULIN SER CALC-MCNC: 2.8 G/DL (ref 1.9–3.5)
GLUCOSE BLD-MCNC: 193 MG/DL (ref 65–99)
GLUCOSE BLD-MCNC: 87 MG/DL (ref 65–99)
GLUCOSE BLD-MCNC: 99 MG/DL (ref 65–99)
GLUCOSE SERPL-MCNC: 102 MG/DL (ref 65–99)
HCT VFR BLD AUTO: 32.2 % (ref 37–47)
HGB BLD-MCNC: 9.9 G/DL (ref 12–16)
IMM GRANULOCYTES # BLD AUTO: 0 K/UL (ref 0–0.11)
IMM GRANULOCYTES NFR BLD AUTO: 0 % (ref 0–0.9)
INR PPP: 2.08 (ref 0.87–1.13)
LYMPHOCYTES # BLD AUTO: 1.12 K/UL (ref 1–4.8)
LYMPHOCYTES NFR BLD: 20.2 % (ref 22–41)
MCH RBC QN AUTO: 29.7 PG (ref 27–33)
MCHC RBC AUTO-ENTMCNC: 30.7 G/DL (ref 33.6–35)
MCV RBC AUTO: 96.7 FL (ref 81.4–97.8)
MONOCYTES # BLD AUTO: 0.58 K/UL (ref 0–0.85)
MONOCYTES NFR BLD AUTO: 10.5 % (ref 0–13.4)
NEUTROPHILS # BLD AUTO: 3.63 K/UL (ref 2–7.15)
NEUTROPHILS NFR BLD: 65.5 % (ref 44–72)
NRBC # BLD AUTO: 0 K/UL
NRBC BLD-RTO: 0 /100 WBC
PLATELET # BLD AUTO: 277 K/UL (ref 164–446)
PMV BLD AUTO: 10.6 FL (ref 9–12.9)
POTASSIUM SERPL-SCNC: 3.6 MMOL/L (ref 3.6–5.5)
PROT SERPL-MCNC: 6.3 G/DL (ref 6–8.2)
PROTHROMBIN TIME: 24.1 SEC (ref 12–14.6)
RBC # BLD AUTO: 3.33 M/UL (ref 4.2–5.4)
SODIUM SERPL-SCNC: 141 MMOL/L (ref 135–145)
WBC # BLD AUTO: 5.5 K/UL (ref 4.8–10.8)

## 2019-11-18 PROCEDURE — 80053 COMPREHEN METABOLIC PANEL: CPT

## 2019-11-18 PROCEDURE — A9270 NON-COVERED ITEM OR SERVICE: HCPCS | Performed by: INTERNAL MEDICINE

## 2019-11-18 PROCEDURE — G0378 HOSPITAL OBSERVATION PER HR: HCPCS

## 2019-11-18 PROCEDURE — 700102 HCHG RX REV CODE 250 W/ 637 OVERRIDE(OP): Performed by: STUDENT IN AN ORGANIZED HEALTH CARE EDUCATION/TRAINING PROGRAM

## 2019-11-18 PROCEDURE — 700102 HCHG RX REV CODE 250 W/ 637 OVERRIDE(OP): Performed by: INTERNAL MEDICINE

## 2019-11-18 PROCEDURE — 96372 THER/PROPH/DIAG INJ SC/IM: CPT

## 2019-11-18 PROCEDURE — 85610 PROTHROMBIN TIME: CPT

## 2019-11-18 PROCEDURE — 700101 HCHG RX REV CODE 250: Performed by: INTERNAL MEDICINE

## 2019-11-18 PROCEDURE — A9270 NON-COVERED ITEM OR SERVICE: HCPCS | Performed by: STUDENT IN AN ORGANIZED HEALTH CARE EDUCATION/TRAINING PROGRAM

## 2019-11-18 PROCEDURE — 99215 OFFICE O/P EST HI 40 MIN: CPT | Performed by: INTERNAL MEDICINE

## 2019-11-18 PROCEDURE — 96375 TX/PRO/DX INJ NEW DRUG ADDON: CPT

## 2019-11-18 PROCEDURE — 36415 COLL VENOUS BLD VENIPUNCTURE: CPT

## 2019-11-18 PROCEDURE — 700102 HCHG RX REV CODE 250 W/ 637 OVERRIDE(OP): Performed by: NURSE PRACTITIONER

## 2019-11-18 PROCEDURE — A9270 NON-COVERED ITEM OR SERVICE: HCPCS | Performed by: NURSE PRACTITIONER

## 2019-11-18 PROCEDURE — 96374 THER/PROPH/DIAG INJ IV PUSH: CPT

## 2019-11-18 PROCEDURE — 85025 COMPLETE CBC W/AUTO DIFF WBC: CPT

## 2019-11-18 PROCEDURE — 82962 GLUCOSE BLOOD TEST: CPT

## 2019-11-18 PROCEDURE — 99220 PR INITIAL OBSERVATION CARE,LEVL III: CPT | Performed by: INTERNAL MEDICINE

## 2019-11-18 PROCEDURE — 700111 HCHG RX REV CODE 636 W/ 250 OVERRIDE (IP): Performed by: NURSE PRACTITIONER

## 2019-11-18 PROCEDURE — 85730 THROMBOPLASTIN TIME PARTIAL: CPT

## 2019-11-18 PROCEDURE — 96376 TX/PRO/DX INJ SAME DRUG ADON: CPT

## 2019-11-18 RX ORDER — LOSARTAN POTASSIUM 50 MG/1
100 TABLET ORAL DAILY
Status: DISCONTINUED | OUTPATIENT
Start: 2019-11-19 | End: 2019-11-21 | Stop reason: HOSPADM

## 2019-11-18 RX ORDER — DIGOXIN 125 MCG
250 TABLET ORAL DAILY
Status: DISCONTINUED | OUTPATIENT
Start: 2019-11-18 | End: 2019-11-21 | Stop reason: HOSPADM

## 2019-11-18 RX ORDER — METOPROLOL SUCCINATE 100 MG/1
200 TABLET, EXTENDED RELEASE ORAL DAILY
Status: DISCONTINUED | OUTPATIENT
Start: 2019-11-18 | End: 2019-11-21 | Stop reason: HOSPADM

## 2019-11-18 RX ORDER — BISACODYL 10 MG
10 SUPPOSITORY, RECTAL RECTAL
Status: DISCONTINUED | OUTPATIENT
Start: 2019-11-18 | End: 2019-11-21 | Stop reason: HOSPADM

## 2019-11-18 RX ORDER — POLYETHYLENE GLYCOL 3350 17 G/17G
1 POWDER, FOR SOLUTION ORAL
Status: DISCONTINUED | OUTPATIENT
Start: 2019-11-18 | End: 2019-11-21 | Stop reason: HOSPADM

## 2019-11-18 RX ORDER — HYDRALAZINE HYDROCHLORIDE 20 MG/ML
20 INJECTION INTRAMUSCULAR; INTRAVENOUS EVERY 6 HOURS PRN
Status: DISCONTINUED | OUTPATIENT
Start: 2019-11-18 | End: 2019-11-21 | Stop reason: HOSPADM

## 2019-11-18 RX ORDER — ATORVASTATIN CALCIUM 40 MG/1
40 TABLET, FILM COATED ORAL EVERY EVENING
Status: DISCONTINUED | OUTPATIENT
Start: 2019-11-18 | End: 2019-11-21 | Stop reason: HOSPADM

## 2019-11-18 RX ORDER — CLOPIDOGREL BISULFATE 75 MG/1
75 TABLET ORAL DAILY
Status: DISCONTINUED | OUTPATIENT
Start: 2019-11-18 | End: 2019-11-21 | Stop reason: HOSPADM

## 2019-11-18 RX ORDER — SPIRONOLACTONE 25 MG/1
25 TABLET ORAL DAILY
Status: DISCONTINUED | OUTPATIENT
Start: 2019-11-18 | End: 2019-11-21 | Stop reason: HOSPADM

## 2019-11-18 RX ORDER — LABETALOL HYDROCHLORIDE 5 MG/ML
10 INJECTION, SOLUTION INTRAVENOUS EVERY 4 HOURS PRN
Status: DISCONTINUED | OUTPATIENT
Start: 2019-11-18 | End: 2019-11-21 | Stop reason: HOSPADM

## 2019-11-18 RX ORDER — ACETAMINOPHEN 325 MG/1
650 TABLET ORAL EVERY 6 HOURS PRN
Status: DISCONTINUED | OUTPATIENT
Start: 2019-11-18 | End: 2019-11-21 | Stop reason: HOSPADM

## 2019-11-18 RX ORDER — LOSARTAN POTASSIUM 50 MG/1
50 TABLET ORAL DAILY
Status: DISCONTINUED | OUTPATIENT
Start: 2019-11-18 | End: 2019-11-18

## 2019-11-18 RX ORDER — AMOXICILLIN 250 MG
2 CAPSULE ORAL 2 TIMES DAILY
Status: DISCONTINUED | OUTPATIENT
Start: 2019-11-18 | End: 2019-11-21 | Stop reason: HOSPADM

## 2019-11-18 RX ORDER — AMLODIPINE BESYLATE 5 MG/1
5 TABLET ORAL
Status: DISCONTINUED | OUTPATIENT
Start: 2019-11-18 | End: 2019-11-19

## 2019-11-18 RX ADMIN — DIGOXIN 250 MCG: 250 TABLET ORAL at 17:11

## 2019-11-18 RX ADMIN — SPIRONOLACTONE 25 MG: 50 TABLET ORAL at 04:57

## 2019-11-18 RX ADMIN — LABETALOL HYDROCHLORIDE 10 MG: 5 INJECTION INTRAVENOUS at 07:55

## 2019-11-18 RX ADMIN — LOSARTAN POTASSIUM 50 MG: 50 TABLET, FILM COATED ORAL at 04:57

## 2019-11-18 RX ADMIN — INSULIN HUMAN 1 UNITS: 100 INJECTION, SOLUTION PARENTERAL at 17:12

## 2019-11-18 RX ADMIN — ATORVASTATIN CALCIUM 40 MG: 40 TABLET, FILM COATED ORAL at 17:11

## 2019-11-18 RX ADMIN — ASPIRIN 81 MG: 81 TABLET, COATED ORAL at 20:48

## 2019-11-18 RX ADMIN — HYDRALAZINE HYDROCHLORIDE 20 MG: 20 INJECTION INTRAMUSCULAR; INTRAVENOUS at 11:24

## 2019-11-18 RX ADMIN — HYDRALAZINE HYDROCHLORIDE 20 MG: 20 INJECTION INTRAMUSCULAR; INTRAVENOUS at 20:49

## 2019-11-18 RX ADMIN — AMLODIPINE BESYLATE 5 MG: 5 TABLET ORAL at 09:15

## 2019-11-18 RX ADMIN — CLOPIDOGREL BISULFATE 75 MG: 75 TABLET ORAL at 13:32

## 2019-11-18 RX ADMIN — METOPROLOL SUCCINATE 200 MG: 100 TABLET, FILM COATED, EXTENDED RELEASE ORAL at 04:55

## 2019-11-18 ASSESSMENT — COPD QUESTIONNAIRES
COPD SCREENING SCORE: 4
DO YOU EVER COUGH UP ANY MUCUS OR PHLEGM?: NO/ONLY WITH OCCASIONAL COLDS OR INFECTIONS
HAVE YOU SMOKED AT LEAST 100 CIGARETTES IN YOUR ENTIRE LIFE: YES
DURING THE PAST 4 WEEKS HOW MUCH DID YOU FEEL SHORT OF BREATH: SOME OF THE TIME

## 2019-11-18 ASSESSMENT — ENCOUNTER SYMPTOMS
COUGH: 0
TINGLING: 1
FEVER: 0
SEIZURES: 0
ABDOMINAL PAIN: 0
SHORTNESS OF BREATH: 0
HEMOPTYSIS: 0
WHEEZING: 0
BLOOD IN STOOL: 0
BLURRED VISION: 0
MYALGIAS: 0
CHILLS: 0
CARDIOVASCULAR NEGATIVE: 1
DIAPHORESIS: 0
CLAUDICATION: 0
DOUBLE VISION: 0
RESPIRATORY NEGATIVE: 1
PSYCHIATRIC NEGATIVE: 1
ORTHOPNEA: 0
PALPITATIONS: 0
SORE THROAT: 0
FLANK PAIN: 0
NECK PAIN: 0
BRUISES/BLEEDS EASILY: 0
BACK PAIN: 0
FOCAL WEAKNESS: 0
GASTROINTESTINAL NEGATIVE: 1
NAUSEA: 0
DIZZINESS: 0
SPUTUM PRODUCTION: 0
DIARRHEA: 0
HEADACHES: 0
EYES NEGATIVE: 1
PND: 0
SENSORY CHANGE: 1
DEPRESSION: 0
CONSTITUTIONAL NEGATIVE: 1
MUSCULOSKELETAL NEGATIVE: 1
HEARTBURN: 0
VOMITING: 0

## 2019-11-18 ASSESSMENT — PATIENT HEALTH QUESTIONNAIRE - PHQ9
SUM OF ALL RESPONSES TO PHQ9 QUESTIONS 1 AND 2: 0
2. FEELING DOWN, DEPRESSED, IRRITABLE, OR HOPELESS: NOT AT ALL
1. LITTLE INTEREST OR PLEASURE IN DOING THINGS: NOT AT ALL
SUM OF ALL RESPONSES TO PHQ9 QUESTIONS 1 AND 2: 0
SUM OF ALL RESPONSES TO PHQ9 QUESTIONS 1 AND 2: 0
2. FEELING DOWN, DEPRESSED, IRRITABLE, OR HOPELESS: NOT AT ALL
1. LITTLE INTEREST OR PLEASURE IN DOING THINGS: NOT AT ALL
2. FEELING DOWN, DEPRESSED, IRRITABLE, OR HOPELESS: NOT AT ALL
1. LITTLE INTEREST OR PLEASURE IN DOING THINGS: NOT AT ALL

## 2019-11-18 ASSESSMENT — LIFESTYLE VARIABLES
EVER HAD A DRINK FIRST THING IN THE MORNING TO STEADY YOUR NERVES TO GET RID OF A HANGOVER: NO
EVER_SMOKED: YES
DOES PATIENT WANT TO STOP DRINKING: NO
EVER_SMOKED: YES
ON A TYPICAL DAY WHEN YOU DRINK ALCOHOL HOW MANY DRINKS DO YOU HAVE: 0
EVER FELT BAD OR GUILTY ABOUT YOUR DRINKING: NO
TOTAL SCORE: 0
ALCOHOL_USE: NO
AVERAGE NUMBER OF DAYS PER WEEK YOU HAVE A DRINK CONTAINING ALCOHOL: 0
CONSUMPTION TOTAL: NEGATIVE
HAVE YOU EVER FELT YOU SHOULD CUT DOWN ON YOUR DRINKING: NO
HOW MANY TIMES IN THE PAST YEAR HAVE YOU HAD 5 OR MORE DRINKS IN A DAY: 0
HAVE PEOPLE ANNOYED YOU BY CRITICIZING YOUR DRINKING: NO

## 2019-11-18 ASSESSMENT — PAIN SCALES - WONG BAKER
WONGBAKER_NUMERICALRESPONSE: DOESN'T HURT AT ALL

## 2019-11-18 NOTE — ASSESSMENT & PLAN NOTE
Continue at this point with medical optimization of her coronary artery disease.  The patient is status post PCI and she has no chest pain.  Continue at this point with Lipitor, metoprolol, losartan, and aspirin and Plavix.

## 2019-11-18 NOTE — PROGRESS NOTES
Received  direct admit  from  [place]  aox4, afib  on monitor, steady on her feet. Denies pain or sob. Call light within reach. Needs attended. Plan of care discussed and understood.

## 2019-11-18 NOTE — H&P
Hospital Medicine History & Physical Note    Date of Service  11/18/2019    Primary Care Physician  Pcp Unknown    Consultants  Cardiology    Code Status  Full code    Chief Complaint  Right wrist swelling and numbness of fingers    History of Presenting Illness  54 y.o. female with a past medical history of CAD status post cardiac catheterization via right radial approach with stent placement on 10/18/2019 who presented 11/18/2019 with right wrist swelling with numbness and tingling of her right hand.  Patient states 4 days after her cardiac catheterization she noted swelling and bruising of her right wrist at the ulnar aspect.  She progressively developed symptoms of numbness and tingling of her fingers.  She followed up with her cardiologist who ordered a ultrasound that revealed a pseudoaneurysm of her ulnar artery.  She was instructed to present to the ER after her ultrasound resulted.  She was transferred from [facility] to [facility] after discussion with cardiology Dr. Calhoun who recommended IR consultation.  She denies any fevers, chills, chest pain or worsening shortness of breath.  She stopped taking her aspirin 4 days ago on the instruction of her cardiologist.  She is currently taking Plavix and Xarelto.    Review of Systems  Review of Systems   Constitutional: Negative for chills, diaphoresis and fever.   HENT: Negative for hearing loss and sore throat.    Eyes: Negative for blurred vision.   Respiratory: Negative for cough, sputum production, shortness of breath and wheezing.    Cardiovascular: Negative for chest pain, palpitations and leg swelling.        Right wrist swelling   Gastrointestinal: Negative for abdominal pain, blood in stool, diarrhea, nausea and vomiting.   Genitourinary: Negative for dysuria, flank pain and urgency.   Musculoskeletal: Negative for back pain, joint pain, myalgias and neck pain.   Skin: Negative for rash.   Neurological: Positive for tingling and sensory change.  Negative for dizziness, focal weakness, seizures and headaches.   Endo/Heme/Allergies: Does not bruise/bleed easily.   Psychiatric/Behavioral: Negative for suicidal ideas.   All other systems reviewed and are negative.      Past Medical History   has a past medical history of Atrial fibrillation (HCC), CAD (coronary artery disease), CHF (congestive heart failure) (HCC), Diabetes (HCC), Hyperlipidemia, Hypertension, Ischemic cardiomyopathy, Pneumonia (1975), and Stroke (HCC) (2011).    Surgical History   has a past surgical history that includes recovery (6/2/2011) and zzz cardiac cath (10/24/2019).     Family History  family history includes GI Disease in her brother; Heart Disease in her father and mother; Hypertension in her brother, brother, sister, and sister; Other in her father.     Social History   reports that she quit smoking about 4 weeks ago. Her smoking use included cigarettes. She has a 10.00 pack-year smoking history. She has never used smokeless tobacco. She reports previous alcohol use. She reports that she does not use drugs.    Allergies  Allergies   Allergen Reactions   • Bee Anaphylaxis     Swelling and airway closure.   • Latex Itching and Swelling     Swelling, redness, itching.   • Strawberry Itching     itching         Medications  Prior to Admission Medications   Prescriptions Last Dose Informant Patient Reported? Taking?   atorvastatin (LIPITOR) 40 MG Tab 11/17/2019 at 2100  No No   Sig: Take 1 Tab by mouth every evening.   clopidogrel (PLAVIX) 75 MG Tab 11/17/2019 at 0900  No No   Sig: Take 1 Tab by mouth every day.   digoxin (LANOXIN) 250 MCG Tab 11/17/2019 at 1800  No No   Sig: Take 1 Tab by mouth every day at 6 PM.   glipiZIDE (GLUCOTROL) 5 MG Tab 11/17/2019 at 1800  No No   Sig: Take 0.5 Tabs by mouth 2 times a day.   losartan (COZAAR) 50 MG Tab 11/17/2019 at 0900  No No   Sig: Take 1 Tab by mouth every day.   metoprolol (TOPROL-XL) 200 MG XL tablet 11/17/2019 at 0900  No No   Sig:  Take 1 Tab by mouth every day.   rivaroxaban (XARELTO) 20 MG Tab tablet 11/17/2019 at 1800  No No   Sig: Take 1 Tab by mouth with dinner.   spironolactone (ALDACTONE) 25 MG Tab 11/17/2019 at 0900  No No   Sig: Take 1 Tab by mouth every day.      Facility-Administered Medications: None       Physical Exam  Temp:  [36.1 °C (96.9 °F)] 36.1 °C (96.9 °F)  Pulse:  [70-78] 78  Resp:  [16-17] 17  BP: (188-192)/(83-84) 188/84  SpO2:  [95 %] 95 %    Physical Exam  Vitals signs and nursing note reviewed.   Constitutional:       General: She is not in acute distress.     Appearance: Normal appearance. She is obese.   HENT:      Head: Normocephalic and atraumatic.      Nose: Nose normal.      Mouth/Throat:      Mouth: Mucous membranes are moist.   Eyes:      Extraocular Movements: Extraocular movements intact.      Conjunctiva/sclera: Conjunctivae normal.      Pupils: Pupils are equal, round, and reactive to light.   Neck:      Musculoskeletal: Normal range of motion and neck supple.   Cardiovascular:      Rate and Rhythm: Normal rate. Rhythm irregular.      Pulses: Normal pulses.      Heart sounds: Normal heart sounds.      Comments: Swelling of the right wrist at the ulnar aspect  Pulmonary:      Effort: Pulmonary effort is normal. No respiratory distress.      Breath sounds: Normal breath sounds. No wheezing, rhonchi or rales.   Abdominal:      General: Bowel sounds are normal. There is no distension.      Palpations: Abdomen is soft.      Tenderness: There is no tenderness.   Musculoskeletal: Normal range of motion.         General: No swelling or tenderness.   Lymphadenopathy:      Cervical: No cervical adenopathy.   Skin:     General: Skin is warm.      Coloration: Skin is not jaundiced.      Findings: No rash.   Neurological:      General: No focal deficit present.      Mental Status: She is alert and oriented to person, place, and time.      Cranial Nerves: No cranial nerve deficit.      Motor: No weakness.    Psychiatric:         Mood and Affect: Mood normal.         Behavior: Behavior normal.         Laboratory:          No results for input(s): ALTSGPT, ASTSGOT, ALKPHOSPHAT, TBILIRUBIN, DBILIRUBIN, GAMMAGT, AMYLASE, LIPASE, ALB, PREALBUMIN, GLUCOSE in the last 72 hours.      No results for input(s): NTPROBNP in the last 72 hours.      No results for input(s): TROPONINT in the last 72 hours.    Urinalysis:    No results found     Imaging:  No orders to display         Assessment/Plan:  I anticipate this patient is appropriate for observation status at this time.    Aneurysm of right ulnar artery (HCC)- (present on admission)  Assessment & Plan  Status post cardiac catheterization  Cardiology was consulted with the transferring physician peer  We will consider consulting vascular surgery/interventional radiology in the morning for further recommendations   hold Plavix and Xarelto for possible procedure    Acute combined systolic and diastolic heart failure (HCC)- (present on admission)  Assessment & Plan  Appears well compensated  Fluid and salt restriction  Continue Aldactone, Toprol and Cozaar    AF (atrial fibrillation) (HCC)- (present on admission)  Assessment & Plan  Continue Toprol and digoxin    Coronary artery disease involving native coronary artery of native heart without angina pectoris- (present on admission)  Assessment & Plan  Status post stent placement  Aspirin was discontinued recently by her cardiologist  Patient is currently on Plavix and Xarelto, will hold at this time for possible repair of ulnar artery aneurysm.  Continue atorvastatin      Obesity- (present on admission)  Assessment & Plan  Body mass index is 34.72 kg/m².  Patient has been counseled on diet and lifestyle modifications      Type 2 diabetes mellitus with hyperglycemia, without long-term current use of insulin (HCC)- (present on admission)  Assessment & Plan  Start on insulin sliding scale with serial Accu-Checks  Hypoglycemic  protocol in place          VTE prophylaxis: SCD

## 2019-11-18 NOTE — PROGRESS NOTES
54 year old female from [facility] with findings of ulnar artery aneurysm, hand numbness.  Jeffry Calhoun was consulted by outside facility and agrees to consult.  Will need IR consultation as well.

## 2019-11-18 NOTE — CONSULTS
Date of Service  11/18/2019    Reason For Consult  R hand pain/numbness    Requesting Physician  Jonathan Calloway MD    Consulting Physician  Ishaan Lechuga M.D.    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 She has hx of Afib (on xarelto), CHF, CVA, HTN, and HLD.    She had a cardiac cath w/ two KAYCE placed in the LAD (on ASA/plavix) via R ulnar access on 10/24/2019 and states that she initially did fine. About 4 days later, she noticed swelling and pain at the access site. A duplex from OSH (neither images nor report available) suggested a PSA.    She currently has mild wrist swelling, but significant pain and numbness of the 4th and 5th fingers. She denies any motor deficits.    Review of Systems  Review of Systems   Constitutional: Negative.    HENT: Negative.    Eyes: Negative.    Respiratory: Negative.    Cardiovascular: Negative.    Gastrointestinal: Negative.    Genitourinary: Negative.    Musculoskeletal: Negative.    Skin: Negative.    Neurological: Positive for sensory change.   Endo/Heme/Allergies: Negative.    Psychiatric/Behavioral: Negative.        Past Medical History  Past Medical History:   Diagnosis Date   • Atrial fibrillation (HCC)    • CAD (coronary artery disease)    • CHF (congestive heart failure) ([facility])    • Diabetes ([facility])    • Hyperlipidemia    • Hypertension    • Ischemic cardiomyopathy    • Pneumonia 1975    as a child   • Stroke (HCC) 2011    right-sided weakness at that time-resolved       Surgical History  Past Surgical History:   Procedure Laterality Date   • ZZZ CARDIAC CATH  10/24/2019    KAYCE x2 to mid and distal LAD   • RECOVERY  6/2/2011    [facility]        Family History  Family History   Problem  Relation Age of Onset   • Heart Disease Mother         Stents   • Heart Disease Father    • Other Father         Pancreatitis   • Hypertension Sister    • Hypertension Brother    • Hypertension Sister    • GI Disease Brother         Crohns   • Hypertension Brother         Social History  Social History     Socioeconomic History   • Marital status: Single     Spouse name: Not on file   • Number of children: Not on file   • Years of education: Not on file   • Highest education level: Not on file   Occupational History   • Not on file   Social Needs   • Financial resource strain: Not on file   • Food insecurity:     Worry: Not on file     Inability: Not on file   • Transportation needs:     Medical: Not on file     Non-medical: Not on file   Tobacco Use   • Smoking status: Former Smoker     Packs/day: 0.50     Years: 20.00     Pack years: 10.00     Types: Cigarettes     Last attempt to quit: 10/18/2019     Years since quittin.0   • Smokeless tobacco: Never Used   • Tobacco comment: Quit smoking when she went into the hospital   Substance and Sexual Activity   • Alcohol use: Not Currently     Frequency: Never     Comment: very rare drink of a beer or wine   • Drug use: No   • Sexual activity: Not on file   Lifestyle   • Physical activity:     Days per week: Not on file     Minutes per session: Not on file   • Stress: Not on file   Relationships   • Social connections:     Talks on phone: Not on file     Gets together: Not on file     Attends Confucianist service: Not on file     Active member of club or organization: Not on file     Attends meetings of clubs or organizations: Not on file     Relationship status: Not on file   • Intimate partner violence:     Fear of current or ex partner: Not on file     Emotionally abused: Not on file     Physically abused: Not on file     Forced sexual activity: Not on file   Other Topics Concern   • Not on file   Social History Narrative   • Not on file       Medications  Prior to  Admission Medications   Prescriptions Last Dose Informant Patient Reported? Taking?   atorvastatin (LIPITOR) 40 MG Tab 11/17/2019 at 2100  No No   Sig: Take 1 Tab by mouth every evening.   clopidogrel (PLAVIX) 75 MG Tab 11/17/2019 at 0900  No No   Sig: Take 1 Tab by mouth every day.   digoxin (LANOXIN) 250 MCG Tab 11/17/2019 at 1800  No No   Sig: Take 1 Tab by mouth every day at 6 PM.   glipiZIDE (GLUCOTROL) 5 MG Tab 11/17/2019 at 1800  No No   Sig: Take 0.5 Tabs by mouth 2 times a day.   losartan (COZAAR) 50 MG Tab 11/17/2019 at 0900  No No   Sig: Take 1 Tab by mouth every day.   metoprolol (TOPROL-XL) 200 MG XL tablet 11/17/2019 at 0900  No No   Sig: Take 1 Tab by mouth every day.   rivaroxaban (XARELTO) 20 MG Tab tablet 11/17/2019 at 1800  No No   Sig: Take 1 Tab by mouth with dinner.   spironolactone (ALDACTONE) 25 MG Tab 11/17/2019 at 0900  No No   Sig: Take 1 Tab by mouth every day.      Facility-Administered Medications: None       Current Facility-Administered Medications   Medication Dose Route Frequency Provider Last Rate Last Dose   • senna-docusate (PERICOLACE or SENOKOT S) 8.6-50 MG per tablet 2 Tab  2 Tab Oral BID Jonathan Calloway M.D.        And   • polyethylene glycol/lytes (MIRALAX) PACKET 1 Packet  1 Packet Oral QDAY PRN Jonathan Calloway M.D.        And   • magnesium hydroxide (MILK OF MAGNESIA) suspension 30 mL  30 mL Oral QDAY PRN Jonathan Calloway M.D.        And   • bisacodyl (DULCOLAX) suppository 10 mg  10 mg Rectal QDAY PRN Jonathan Calloway M.D.       • Respiratory Care per Protocol   Nebulization Continuous RT Jonathan Calloway M.D.       • acetaminophen (TYLENOL) tablet 650 mg  650 mg Oral Q6HRS PRN Jonathan Calloway M.D.       • atorvastatin (LIPITOR) tablet 40 mg  40 mg Oral Q EVENING Jonathan Calloway M.D.       • digoxin (LANOXIN) tablet 250 mcg  250 mcg Oral DAILY AT 1800 Jonathan Calloway M.D.       • metoprolol SR (TOPROL XL) tablet 200 mg  200 mg Oral DAILY Jonathan Calloway M.D.   200 mg at 11/18/19 0455   •  spironolactone (ALDACTONE) tablet 25 mg  25 mg Oral DAILY Jonathan Calloway M.D.   25 mg at 11/18/19 0457   • labetalol (NORMODYNE/TRANDATE) injection 10 mg  10 mg Intravenous Q4HRS PRN Jonathan Calloway M.D.   10 mg at 11/18/19 0755   • insulin regular (HUMULIN R) injection 1-6 Units  1-6 Units Subcutaneous Q6HRS Jonathan Calloway M.D.   Stopped at 11/18/19 0600    And   • glucose 4 g chewable tablet 16 g  16 g Oral Q15 MIN PRN Jonathan Calloway M.D.        And   • DEXTROSE 10% BOLUS 250 mL  250 mL Intravenous Q15 MIN PRN Jonathan Calloway M.D.       • amLODIPine (NORVASC) tablet 5 mg  5 mg Oral Q DAY Leonard Stefania, A.P.R.N.   5 mg at 11/18/19 0915   • [START ON 11/19/2019] losartan (COZAAR) tablet 100 mg  100 mg Oral DAILY Yuri Galvez M.D.       • hydrALAZINE (APRESOLINE) injection 20 mg  20 mg Intravenous Q6HRS PRN Leonard Guzmank, A.P.R.N.   20 mg at 11/18/19 1124   • clopidogrel (PLAVIX) tablet 75 mg  75 mg Oral DAILY Yuri Galvez M.D.   75 mg at 11/18/19 1332       Allergies  Allergies   Allergen Reactions   • Bee Anaphylaxis     Swelling and airway closure.   • Latex Itching and Swelling     Swelling, redness, itching.   • Strawberry Itching     itching         Physical Exam  Temp:  [36.1 °C (96.9 °F)-36.3 °C (97.4 °F)] 36.3 °C (97.3 °F)  Pulse:  [65-78] 75  Resp:  [16-19] 18  BP: (151-206)/(70-91) 151/70  SpO2:  [95 %-97 %] 97 %    Pulse/Extremity Exam:  Palpable right brachial and radial. Ulnar not palpable. Hand is warm with good cap refill. Tenderness of the right wrist, ulnar aspect. 5/5     Wounds: None    General appearance: NAD, conversing appropriately  Psych: Normal affect, mood, judgement  Neuro: CN II-XII grossly intact.   Neck: full range of motion  Lungs: No inspiratory stridor or wheezing  CV: RRR  Abdomen: Soft, NT/ND  Skin: No rashes    Labs Reviewed Today:  Recent Labs     11/18/19  0207   WBC 5.5   RBC 3.33*   HEMOGLOBIN 9.9*   HEMATOCRIT 32.2*   MCV 96.7   MCH 29.7   MCHC 30.7*   RDW 55.5*   PLATELETCT 277    MPV 10.6     Recent Labs     11/18/19  0207   SODIUM 141   POTASSIUM 3.6   CHLORIDE 110   CO2 23   GLUCOSE 102*   BUN 14   CREATININE 0.93   CALCIUM 8.8     Recent Labs     11/18/19  0207   ALTSGPT 12   ASTSGOT 15   ALKPHOSPHAT 108*   TBILIRUBIN 0.9   GLUCOSE 102*     Recent Labs     11/18/19  0207   APTT 38.3*   INR 2.08*             No results for input(s): TROPONINI in the last 72 hours.    Imaging Reviewed Today:    Arterial Duplex: pending    Assessment/Plan & Medical Decision-Making  54F admitted w/ right wrist pain and finger numbness after cath via ulnar access almost 1mo ago.     Duplex pending.    Would hold xarelto if able. Would continue ASA/plavix given recent KAYCE to the LAD.    Plan to follow once imaging obtained.    Ishaan Lechuga MD  Vascular Surgeon  [facility]  Office: [phone]

## 2019-11-18 NOTE — CARE PLAN
Problem: Safety  Goal: Will remain free from injury  Outcome: MET     Problem: Skin Integrity  Goal: Risk for impaired skin integrity will decrease  Outcome: MET     Problem: Knowledge Deficit  Goal: Knowledge of disease process/condition, treatment plan, diagnostic tests, and medications will improve  Outcome: MET

## 2019-11-18 NOTE — PROGRESS NOTES
55 y/o female with h/o a-fib, heart failure, diabetes, HTN, HLD, and CVA.  She had recent cardiac catheterization on 10/24.  She has had worsening swelling of her right wrist since the procedure.  Now she is complaining of numbness and and weakness of the right hand.  Found to have 2.4 cm by 1.8 cm pseudoaneurysm of right ulnar artery on ultrasound.  Referred to the ED by cardiology for surgical evaluation.  Dr. Lechuga consulted.  Holding Xarelto.  Restart Plavix.  Blood pressures have been elevated.  Started norvasc 5 mg daily.  PRN hydralazine and labetalol available.  Continue to monitor on tele.

## 2019-11-18 NOTE — PROGRESS NOTES
Cardiology Interval Note  Note Author: Yuri Galvez M.D.     Name Judith Vallecillo     1965   Age/Sex 54 y.o. female   MRN [id number]         After 5PM or if no immediate response to page, please call for cross-coverage  Attending/Team: Dr. Lehman/Cardiology See Patient List for primary contact information  Call [phone] to page    1st Call - Dr. Galvez          Reason for interval visit  (Principal Problem)   Pseudoaneurysm at R. Ulnar    Brief History: Patient is a 54-year-old female with history of CAD S/P 2 stent placement on 10/24/2019, HFrEF less than 30%, chronic A. fib, left MCA infarct, type II DM, hyperlipidemia, hypertension transferred from [facility] regarding right ulnar artery pseudoaneurysm level seen on ultrasound.  Please refer to Dr. Calhoun's consultation note for full details.    Interval Problem Daily Status Update  (24 hours)     -Patient reports pain, weakness along with numbness and tingling at right fourth and fifth metacarpals.  She reports the swelling has been stable for the last 3 weeks, however, she is having increasing symptoms of pain/weakness  -Increase in blood pressure to ~180's/90's  -I did speak to interventional radiologist who recommended consulting vascular surgeon.  Given that patient is on Xarelto, Plavix, aspirin, thrombin injection might not be as effective.  Passed on the message to hospitalist who will consult for surgery.    Review of Systems   Constitutional: Negative for chills and fever.   Eyes: Negative for blurred vision and double vision.   Respiratory: Negative for cough and hemoptysis.    Cardiovascular: Negative for chest pain, palpitations, orthopnea, claudication, leg swelling and PND.   Gastrointestinal: Negative for heartburn and nausea.   Genitourinary: Negative for dysuria and urgency.   Neurological: Positive for tingling. Negative for dizziness and headaches.        Numbness, tingling and weakness at R. 4th and 5th  fingers going to R. Wrist   Psychiatric/Behavioral: Negative for depression and suicidal ideas.         Physical Exam       Vitals:    11/18/19 0409 11/18/19 0453 11/18/19 0647 11/18/19 0747   BP: (!) 175/83 (!) 190/87 (!) 190/84 (!) 194/87   Pulse: 66 73 65 68   Resp: 19 18 19 18   Temp: 36.3 °C (97.4 °F)   36.3 °C (97.3 °F)   TempSrc: Temporal   Temporal   SpO2: 95% 96% 97% 96%   Weight:       Height:         Body mass index is 34.72 kg/m². Weight: 86.1 kg (189 lb 13.1 oz)  Oxygen Therapy:  Pulse Oximetry: 96 %, O2 (LPM): 0, O2 Delivery: None (Room Air)    Physical Exam   Constitutional: She is oriented to person, place, and time. No distress.   HENT:   Head: Normocephalic and atraumatic.   Eyes: Pupils are equal, round, and reactive to light. EOM are normal.   Neck: Normal range of motion. Neck supple. No thyromegaly present.   Cardiovascular: Normal rate.   No murmur heard.  Irregularly Irregular Rhythm    Pulmonary/Chest: Effort normal and breath sounds normal. No respiratory distress.   Abdominal: Soft. Bowel sounds are normal. She exhibits no distension.   Musculoskeletal: Normal range of motion.         General: No deformity.      Comments: Mild edema without bruising or discoloration Medical side of R. Wrist    Neurological: She is alert and oriented to person, place, and time. No cranial nerve deficit.   Skin: Skin is warm and dry. She is not diaphoretic. No erythema.   Psychiatric: Mood, memory, affect and judgment normal.         Lab Data Review:   11/18/2019  10:13 AM    Recent Labs     11/18/19 0207   SODIUM 141   POTASSIUM 3.6   CHLORIDE 110   CO2 23   BUN 14   CREATININE 0.93   CALCIUM 8.8       Recent Labs     11/18/19 0207   ALTSGPT 12   ASTSGOT 15   ALKPHOSPHAT 108*   TBILIRUBIN 0.9   GLUCOSE 102*       Recent Labs     11/18/19 0207   RBC 3.33*   HEMOGLOBIN 9.9*   HEMATOCRIT 32.2*   PLATELETCT 277   PROTHROMBTM 24.1*   APTT 38.3*   INR 2.08*       Recent Labs     11/18/19 0207   WBC 5.5    NEUTSPOLYS 65.50   LYMPHOCYTES 20.20*   MONOCYTES 10.50   EOSINOPHILS 2.50   BASOPHILS 1.30   ASTSGOT 15   ALTSGPT 12   ALKPHOSPHAT 108*   TBILIRUBIN 0.9           Assessment/Plan    #Pseudoaneurysm at R. Wrist  #Status post cath on 10/24/2019  #Chronic systolic heart failure, NYHA class III, stage C  #Chronic A. fib with left atrial thrombus per [facility] on 10/26/2019  #CAD s/p cardiac cath 2 stents placed 10/24/2019  #Ischemic CVA   #Hypertension  #Dyslipidemia  #Previous smoker stopped 1 month ago    Recommendations:    Patient is a 54-year-old female with significant cardiac history with coronary artery disease s/p 2 stents placed, HFrEF less than 30%, ischemic CVA presented with pseudoaneurysm of the right ulnar artery.    After speaking with interventional radiologist, patient might not be a good candidate for thrombin injection given that she has been on Plavix and aspirin. Passed on the message to hospitalist who will consult for surgery.  Due to patient's significant risk of thrombosis at both stents and the left atrial appendage, she would need to be on aspirin Plavix and Xarelto ASAP.  Recommend vascular surgery consult

## 2019-11-18 NOTE — DISCHARGE PLANNING
Message received from pt's sister Desiree([phone]) stating that pt is here at [facility] and she is concerned for her mental status.   Pt is currently in T213. CM updated MAT Trujillo and then spoke with pt who states that it is ok for this CM to call her sister Desiree.   CM called Desiree and she states that she is worried pt is extremely depressed. 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   Desiree would like to see if it is possible for pt to have a psych consult. MAT let Desiree know we can request this from the MD and let them know of her concerns.   CM updated pt's MAT Trujillo of the above concerns and she will speak with the MD.

## 2019-11-18 NOTE — CONSULTS
Cardiology Initial Consult Note    Date of note:    11/18/2019      Consulting Physician: Juan Abernathy MD    Patient ID:    Name:   Judith Vallecillo   YOB: 1965  Age:   54 y.o.  female   MRN:   [id number]      Reason for Consultation: post-cath complication, right ulnar artery psuedoaneurysm    HPI:  Judith Vallecillo is a 54 y.o.-year-old female with a history of atrial fibrillation, persistent with cancelled cardioversion due to visualized STELLA clot, chronic systolic heart failure, diabetes, hypertension, dyslipidemia, ischemic cardiomyopathy, previously smoking and CVA who presents with right hand numbness and weakness and swelling.    She reports 4-5 days after her cardiac catheterization (10/24/2019) she had acute onset of mild severity right 4th and 5th digit numbness and weakness which has slowly been worsening. She saw her cardiologist, Dr. Westfall in clinic last week who ordered a stat arterial ultrasound. I was called by the radiologist today with positive test results indicating a 2.4cm by 1.8cm pseudoaneurysm of the right ulnar artery. I discussed with the [facility] ED that she should be transferred here for urgent treatment given her neurologic compromise and risk of bleeding on xarelto and plavix, both of which she last took yesterday.     She has otherwise stable CHF symptoms and has not required diuretics.           ROS  Constitution: Negative for chills, fever and night sweats.   HENT: Negative for nosebleeds.    Eyes: Negative for vision loss in left eye and vision loss in right eye.   Respiratory: Negative for hemoptysis.    Gastrointestinal: Negative for hematemesis, hematochezia and melena.   Genitourinary: Negative for hematuria.   Neurological: Negative for focal weakness, numbness and paresthesias.      All others reviewed and negative.      Past Medical History:   Diagnosis Date   • Atrial fibrillation (HCC)    • CAD (coronary artery disease)     • CHF (congestive heart failure) ([facility])    • Diabetes ([facility])    • Hyperlipidemia    • Hypertension    • Ischemic cardiomyopathy    • Pneumonia 1975    as a child   • Stroke (HCC) 2011    right-sided weakness at that time-resolved       Past Surgical History:   Procedure Laterality Date   • ZZZ CARDIAC CATH  10/24/2019    KAYCE x2 to mid and distal LAD   • RECOVERY  6/2/2011    Performed by SURGERY, RECOVERYONLY at [facility]         Medications Prior to Admission   Medication Sig Dispense Refill Last Dose   • digoxin (LANOXIN) 250 MCG Tab Take 1 Tab by mouth every day at 6 PM. 30 Tab 11 11/17/2019 at 1800   • losartan (COZAAR) 50 MG Tab Take 1 Tab by mouth every day. 30 Tab 11 11/17/2019 at 0900   • spironolactone (ALDACTONE) 25 MG Tab Take 1 Tab by mouth every day. 30 Tab 11 11/17/2019 at 0900   • clopidogrel (PLAVIX) 75 MG Tab Take 1 Tab by mouth every day. 30 Tab 11 11/17/2019 at 0900   • rivaroxaban (XARELTO) 20 MG Tab tablet Take 1 Tab by mouth with dinner. 30 Tab 0 11/17/2019 at 1800   • glipiZIDE (GLUCOTROL) 5 MG Tab Take 0.5 Tabs by mouth 2 times a day. 30 Tab 0 11/17/2019 at 1800   • atorvastatin (LIPITOR) 40 MG Tab Take 1 Tab by mouth every evening. 90 Tab 3 11/17/2019 at 2100   • metoprolol (TOPROL-XL) 200 MG XL tablet Take 1 Tab by mouth every day. 30 Tab 0 11/17/2019 at 0900           Allergies   Allergen Reactions   • Bee Anaphylaxis     Swelling and airway closure.   • Latex Itching and Swelling     Swelling, redness, itching.   • Strawberry Itching     itching         Family History   Problem Relation Age of Onset   • Heart Disease Mother         Stents   • Heart Disease Father    • Other Father         Pancreatitis   • Hypertension Sister    • Hypertension Brother    • Hypertension Sister    • GI Disease Brother         Crohns   • Hypertension Brother          Social History     Socioeconomic History   • Marital status: Single     Spouse name: Not on file   • Number of children: Not on  "file   • Years of education: Not on file   • Highest education level: Not on file   Occupational History   • Not on file   Social Needs   • Financial resource strain: Not on file   • Food insecurity:     Worry: Not on file     Inability: Not on file   • Transportation needs:     Medical: Not on file     Non-medical: Not on file   Tobacco Use   • Smoking status: Former Smoker     Packs/day: 0.50     Years: 20.00     Pack years: 10.00     Types: Cigarettes     Last attempt to quit: 10/18/2019     Years since quittin.0   • Smokeless tobacco: Never Used   • Tobacco comment: Quit smoking when she went into the hospital   Substance and Sexual Activity   • Alcohol use: Not Currently     Frequency: Never     Comment: very rare drink of a beer or wine   • Drug use: No   • Sexual activity: Not on file   Lifestyle   • Physical activity:     Days per week: Not on file     Minutes per session: Not on file   • Stress: Not on file   Relationships   • Social connections:     Talks on phone: Not on file     Gets together: Not on file     Attends Congregational service: Not on file     Active member of club or organization: Not on file     Attends meetings of clubs or organizations: Not on file     Relationship status: Not on file   • Intimate partner violence:     Fear of current or ex partner: Not on file     Emotionally abused: Not on file     Physically abused: Not on file     Forced sexual activity: Not on file   Other Topics Concern   • Not on file   Social History Narrative   • Not on file         Physical Exam  Body mass index is 34.72 kg/m².  BP (!) 190/84   Pulse 65   Temp 36.3 °C (97.4 °F) (Temporal)   Resp 19   Ht 1.575 m (5' 2\")   Wt 86.1 kg (189 lb 13.1 oz)   SpO2 97%   Vitals:    19 0159 19 0409 19 0453 19 0647   BP: (!) 188/84 (!) 175/83 (!) 190/87 (!) 190/84   Pulse: 78 66 73 65   Resp:    Temp:  36.3 °C (97.4 °F)     TempSrc:  Temporal     SpO2: 95% 95% 96% 97%   Weight:     "   Height:         Oxygen Therapy:  Pulse Oximetry: 97 %, O2 (LPM): 0, O2 Delivery: None (Room Air)    General: No apparent distress  Eyes: nl conjunctiva  ENT: OP clear  Neck: JVP 4 cm H2O, no carotid bruits  Lungs: normal respiratory effort, CTAB  Heart: RRR, no murmurs, no rubs or gallops, no edema bilateral lower extremities. No LV/RV heave on cardiac palpatation. 2+ bilateral radial pulses.  2+ bilateral dp pulses.   Abdomen: soft, non tender, non distended, no masses, normal bowel sounds.  No HSM.  Extremities/MSK: no clubbing, no cyanosis. + right sided ulnar area swelling  Neurological: decreased sensation and weakness right upper extremity 4th and 5th digits.   Psychiatric: Appropriate affect, A/O x 3  Skin: Warm extremities        Labs (personally reviewed and notable for):   Creatinine 0.9      Cardiac Imaging and Procedures Review:    EKG dated 10/18/2019:   ATRIAL FIBRILLATION, V-RATE     MULTIPLE VENTRICULAR PREMATURE COMPLEXES   PROBABLE LATERAL INFARCT, AGE INDETERMINATE   CONSIDER ANTEROSEPTAL INFARCT   BORDERLINE T ABNORMALITIES, INFERIOR LEAD     Echo dated 10/20/2019:      CONCLUSIONS  Four chamber dilation. Biventricular failure. LV systolic function is   severely reduced. LVEF 20% with global hypokinesis. No significant   valuvlar disease visualized on this study. RVSP estimated at 40 mmHg.   IVC is dilated and fails to collapse with inspiration suggesting   elevated central venous pressures. Compared to the images of the study   done on 06/01/2011 there has been a severe decline in biventricular   Function.    SONIA 10/26/2019:  Echocardiography Laboratory  CONCLUSIONS  Normal left atrial appendage. Definite thrombus in the left atrial   appendage.     [facility] (10/24/2019):   Pre-operative Diagnosis:  1.  New onset heart failure with severe re-reduced left ventricle systolic function unclear etiology  2.  Atrial fibrillation     Post-operative Diagnosis:   1.  Single-vessel coronary artery  disease with 70% stenosis in mid left anterior descending artery (IFR 0.88) and 90% distal left anterior descending artery stenosis  2.  Status post stenting of the mid left anterior descending artery with 3 x 15 mm Xience Deepika drug eluting stent and 2.5 x 15 mm Xience Deepika drug eluting stent to the distal LAD  3.  Cardiomyopathy likely predominantly from nonischemic etiology, probably tachycardia induced  4.  Atrial fibrillation    Radiology test Review:  Brain MRI 10/19/2019:     1.  Moderate size acute area of infarction involving the left posterior temporal, left parietal-occipital, and lateral occipital lobes.     2.  Small chronic area of lacunar infarction involving the left thalamus.     3.  Minimal periventricular, juxtacortical, and pontine white matter changes consistent with chronic microvascular ischemic gliosis    Carotid Artery US  10/19/2019:  arotid Duplex   Report      Vascular Laboratory   CONCLUSIONS   Mild bilateral internal carotid artery stenosis (<50%).    Thyroid nodules measuring less than 1 cm.      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 Cleveland  # Diabetes  # Ischemic CVA, likely embolic  # Hypertension  # Dyslipidemia  # Previous smoking.   # Anemia    Recommendations:  # continue lipitor  # holding plavix this morning, but ideally would start before noon  # continue digoxin and metoprolol for a fib, would wait another month on anticoagulation prior to another attempt at SONIA and CV. Hold xarelto for now, but given extremely high stroke risk would restart asap  # continue losartan, spironolactone, metoprolol for  CHF  # I have consulted interventional radiology, per Dr. Abernathy, images of her ultrasound are at bedside. Suspect thrombin injection with close monitoring of her weakness and numbness. May need vascular surgery consultation if symptoms worsen.       Thank you for allowing me to participate in the care of this patient, Dr Lehman and the cardiology teaching service will continue to follow.  Please contact me with any questions.      Polo Calhoun MD  Cardiologist, [facility] [place]   [phone]

## 2019-11-18 NOTE — TELEPHONE ENCOUNTER
Called by Dr. Goldberg at [facility].      He read the right UE ultrasound which showed a psuedoaneyrms of the right ulnar artery measuring 2.4cm by 1.8 cm. Given Dr. Westfall's last note indicating neurological symptoms, we will page [facility] ED and have the patient transferred to [facility] for management.

## 2019-11-18 NOTE — CARE PLAN
Problem: Pain Management  Goal: Pain level will decrease to patient's comfort goal  Outcome: PROGRESSING AS EXPECTED  Intervention: Follow pain managment plan developed in collaboration with patient and Interdisciplinary Team  Note:   Monitor pain complaints. Administer prescribed pain meds.     Problem: Infection  Goal: Will remain free from infection  Outcome: PROGRESSING AS EXPECTED  Intervention: Implement standard precautions and perform hand washing before and after patient contact  Note:   Practice aseptic technique.

## 2019-11-18 NOTE — ASSESSMENT & PLAN NOTE
-accus with sliding scale coverage, continue with most recent blood sugars 140  -diabetic diet on hold for surgery tonight  -diabetic education provided  -follow glycohemoglobin levels long term  -continue with oral antihyperglycemics  -monitor for hypoglycemic episodes and adjust control if he should get low

## 2019-11-18 NOTE — ASSESSMENT & PLAN NOTE
Currently rate controlled with metoprolol  mg daily as well as digoxin 250 mcg daily.  No anticoagulation with surgery.  After surgery the patient may need to be on anticoagulation.

## 2019-11-18 NOTE — TELEPHONE ENCOUNTER
Solitario Hairston from Dr. Lehman. This patient is now an inpatient at [facility]. Cancelled this procedure per Dr. Lehman's request. Cancelled case with Luciana in the cath lab.

## 2019-11-18 NOTE — PROGRESS NOTES
Assumed patient care, afib on cardiac monitor, denies chest pain. Kept NPO and was able to get patient CD. Did update MD, cardiologist contacted.

## 2019-11-18 NOTE — ASSESSMENT & PLAN NOTE
Patient currently does not complain of shortness of breath or chest pain.  Fluid status at this point is optimized  Continue at this point with a combination of spironolactone 25 mg and metoprolol  mg as well as Cozaar 100 mg.

## 2019-11-18 NOTE — ASSESSMENT & PLAN NOTE
The patient after coronary angiography has developed a pseudoaneurysm of the right ulnar artery.  Patient is currently n.p.o.  Patient will be going to surgery at 1930 today.  Patient's pain today is 8 out of 10 we will give her some IV fentanyl to control the pain.

## 2019-11-18 NOTE — PROGRESS NOTES
Direct admit from [facility], referred by DANIEL Jacques. For Right Ulnar Aneurysm. Admitting MD is Dr. Trammell. Upon patient's arrival release signed and held orders, page admit hospitalist on call for orders.

## 2019-11-18 NOTE — DISCHARGE PLANNING
Care Transition Team Assessment    Spoke with patient at bedside. Lives alone. Has family support. Friend will be ride @ D/C. Spoke with Anne RIVERO from Tele. Updated DR. Abernathy and Leonard COUCH on concerns. Unknown needs @ D/C.    Information Source  Orientation : Oriented x 4  Information Given By: Patient    Readmission Evaluation  Is this a readmission?: No    Interdisciplinary Discharge Planning  Does Admitting Nurse Feel This Could be a Complex Discharge?: No  Primary Care Physician: Dr Sandoval  Lives with - Patient's Self Care Capacity: Alone and Able to Care For Self  Patient or legal guardian wants to designate a caregiver (see row info): No  Support Systems: Family Member(s), Parent  Housing / Facility: [address]  Do You Take your Prescribed Medications Regularly: Yes  Able to Return to Previous ADL's: Yes  Mobility Issues: No  Prior Services: Home-Independent  Patient Expects to be Discharged to:: Home  Assistance Needed: No  Durable Medical Equipment: Not Applicable    Discharge Preparedness  What are your discharge supports?: Parent, Sibling  Prior Functional Level: Ambulatory    Functional Assesment  Prior Functional Level: Ambulatory    Finances  Prescription Coverage: Yes    Anticipated Discharge Information  Anticipated discharge disposition: Home  Discharge Address: [address] [place] [address]   Discharge Contact Phone Number: [phone]

## 2019-11-19 ENCOUNTER — ANESTHESIA EVENT (OUTPATIENT)
Dept: SURGERY | Facility: MEDICAL CENTER | Age: 54
End: 2019-11-19
Payer: COMMERCIAL

## 2019-11-19 ENCOUNTER — APPOINTMENT (OUTPATIENT)
Dept: RADIOLOGY | Facility: MEDICAL CENTER | Age: 54
End: 2019-11-19
Attending: HOSPITALIST
Payer: COMMERCIAL

## 2019-11-19 DIAGNOSIS — Z79.899 HIGH RISK MEDICATION USE: ICD-10-CM

## 2019-11-19 DIAGNOSIS — R20.0 NUMBNESS AND TINGLING OF RIGHT HAND: ICD-10-CM

## 2019-11-19 DIAGNOSIS — R20.2 NUMBNESS AND TINGLING OF RIGHT HAND: ICD-10-CM

## 2019-11-19 LAB
ANION GAP SERPL CALC-SCNC: 10 MMOL/L (ref 0–11.9)
BASOPHILS # BLD AUTO: 0.6 % (ref 0–1.8)
BASOPHILS # BLD: 0.03 K/UL (ref 0–0.12)
BUN SERPL-MCNC: 15 MG/DL (ref 8–22)
CALCIUM SERPL-MCNC: 8.8 MG/DL (ref 8.5–10.5)
CHLORIDE SERPL-SCNC: 108 MMOL/L (ref 96–112)
CO2 SERPL-SCNC: 23 MMOL/L (ref 20–33)
CREAT SERPL-MCNC: 0.98 MG/DL (ref 0.5–1.4)
EOSINOPHIL # BLD AUTO: 0.11 K/UL (ref 0–0.51)
EOSINOPHIL NFR BLD: 2.2 % (ref 0–6.9)
ERYTHROCYTE [DISTWIDTH] IN BLOOD BY AUTOMATED COUNT: 54.4 FL (ref 35.9–50)
GLUCOSE BLD-MCNC: 130 MG/DL (ref 65–99)
GLUCOSE BLD-MCNC: 161 MG/DL (ref 65–99)
GLUCOSE BLD-MCNC: 167 MG/DL (ref 65–99)
GLUCOSE SERPL-MCNC: 140 MG/DL (ref 65–99)
HCT VFR BLD AUTO: 32.4 % (ref 37–47)
HGB BLD-MCNC: 10.1 G/DL (ref 12–16)
IMM GRANULOCYTES # BLD AUTO: 0.01 K/UL (ref 0–0.11)
IMM GRANULOCYTES NFR BLD AUTO: 0.2 % (ref 0–0.9)
LYMPHOCYTES # BLD AUTO: 0.85 K/UL (ref 1–4.8)
LYMPHOCYTES NFR BLD: 17 % (ref 22–41)
MCH RBC QN AUTO: 29.5 PG (ref 27–33)
MCHC RBC AUTO-ENTMCNC: 31.2 G/DL (ref 33.6–35)
MCV RBC AUTO: 94.7 FL (ref 81.4–97.8)
MONOCYTES # BLD AUTO: 0.53 K/UL (ref 0–0.85)
MONOCYTES NFR BLD AUTO: 10.6 % (ref 0–13.4)
NEUTROPHILS # BLD AUTO: 3.47 K/UL (ref 2–7.15)
NEUTROPHILS NFR BLD: 69.4 % (ref 44–72)
NRBC # BLD AUTO: 0 K/UL
NRBC BLD-RTO: 0 /100 WBC
PLATELET # BLD AUTO: 266 K/UL (ref 164–446)
PMV BLD AUTO: 10.2 FL (ref 9–12.9)
POTASSIUM SERPL-SCNC: 3.4 MMOL/L (ref 3.6–5.5)
RBC # BLD AUTO: 3.42 M/UL (ref 4.2–5.4)
SODIUM SERPL-SCNC: 141 MMOL/L (ref 135–145)
WBC # BLD AUTO: 5 K/UL (ref 4.8–10.8)

## 2019-11-19 PROCEDURE — G0378 HOSPITAL OBSERVATION PER HR: HCPCS

## 2019-11-19 PROCEDURE — A9270 NON-COVERED ITEM OR SERVICE: HCPCS | Performed by: NURSE PRACTITIONER

## 2019-11-19 PROCEDURE — 80048 BASIC METABOLIC PNL TOTAL CA: CPT

## 2019-11-19 PROCEDURE — 700102 HCHG RX REV CODE 250 W/ 637 OVERRIDE(OP): Performed by: NURSE PRACTITIONER

## 2019-11-19 PROCEDURE — 36415 COLL VENOUS BLD VENIPUNCTURE: CPT

## 2019-11-19 PROCEDURE — 99226 PR SUBSEQUENT OBSERVATION CARE,LEVEL III: CPT | Performed by: HOSPITALIST

## 2019-11-19 PROCEDURE — 85025 COMPLETE CBC W/AUTO DIFF WBC: CPT

## 2019-11-19 PROCEDURE — A9270 NON-COVERED ITEM OR SERVICE: HCPCS | Performed by: INTERNAL MEDICINE

## 2019-11-19 PROCEDURE — A9270 NON-COVERED ITEM OR SERVICE: HCPCS | Performed by: STUDENT IN AN ORGANIZED HEALTH CARE EDUCATION/TRAINING PROGRAM

## 2019-11-19 PROCEDURE — 96372 THER/PROPH/DIAG INJ SC/IM: CPT

## 2019-11-19 PROCEDURE — 700102 HCHG RX REV CODE 250 W/ 637 OVERRIDE(OP): Performed by: INTERNAL MEDICINE

## 2019-11-19 PROCEDURE — 82962 GLUCOSE BLOOD TEST: CPT

## 2019-11-19 PROCEDURE — 700101 HCHG RX REV CODE 250: Performed by: INTERNAL MEDICINE

## 2019-11-19 PROCEDURE — 93931 UPPER EXTREMITY STUDY: CPT | Mod: RT

## 2019-11-19 PROCEDURE — 96376 TX/PRO/DX INJ SAME DRUG ADON: CPT

## 2019-11-19 PROCEDURE — 700102 HCHG RX REV CODE 250 W/ 637 OVERRIDE(OP): Performed by: STUDENT IN AN ORGANIZED HEALTH CARE EDUCATION/TRAINING PROGRAM

## 2019-11-19 PROCEDURE — 93931 UPPER EXTREMITY STUDY: CPT | Mod: 26,RT | Performed by: INTERNAL MEDICINE

## 2019-11-19 RX ORDER — POTASSIUM CHLORIDE 20 MEQ/1
20 TABLET, EXTENDED RELEASE ORAL 2 TIMES DAILY
Status: DISCONTINUED | OUTPATIENT
Start: 2019-11-19 | End: 2019-11-20

## 2019-11-19 RX ORDER — TRAMADOL HYDROCHLORIDE 50 MG/1
25 TABLET ORAL ONCE
Status: COMPLETED | OUTPATIENT
Start: 2019-11-19 | End: 2019-11-19

## 2019-11-19 RX ORDER — AMLODIPINE BESYLATE 10 MG/1
10 TABLET ORAL
Status: DISCONTINUED | OUTPATIENT
Start: 2019-11-19 | End: 2019-11-21 | Stop reason: HOSPADM

## 2019-11-19 RX ORDER — AMLODIPINE BESYLATE 10 MG/1
10 TABLET ORAL
Status: DISCONTINUED | OUTPATIENT
Start: 2019-11-20 | End: 2019-11-19

## 2019-11-19 RX ADMIN — LOSARTAN POTASSIUM 100 MG: 50 TABLET, FILM COATED ORAL at 05:59

## 2019-11-19 RX ADMIN — LABETALOL HYDROCHLORIDE 10 MG: 5 INJECTION INTRAVENOUS at 08:40

## 2019-11-19 RX ADMIN — METOPROLOL SUCCINATE 200 MG: 100 TABLET, FILM COATED, EXTENDED RELEASE ORAL at 05:58

## 2019-11-19 RX ADMIN — TRAMADOL HYDROCHLORIDE 25 MG: 50 TABLET, FILM COATED ORAL at 12:22

## 2019-11-19 RX ADMIN — AMLODIPINE BESYLATE 5 MG: 5 TABLET ORAL at 05:57

## 2019-11-19 RX ADMIN — ATORVASTATIN CALCIUM 40 MG: 40 TABLET, FILM COATED ORAL at 18:30

## 2019-11-19 RX ADMIN — POTASSIUM CHLORIDE 20 MEQ: 1500 TABLET, EXTENDED RELEASE ORAL at 11:15

## 2019-11-19 RX ADMIN — CLOPIDOGREL BISULFATE 75 MG: 75 TABLET ORAL at 05:58

## 2019-11-19 RX ADMIN — DIGOXIN 250 MCG: 250 TABLET ORAL at 18:40

## 2019-11-19 RX ADMIN — POTASSIUM CHLORIDE 20 MEQ: 1500 TABLET, EXTENDED RELEASE ORAL at 18:30

## 2019-11-19 RX ADMIN — AMLODIPINE BESYLATE 10 MG: 10 TABLET ORAL at 11:28

## 2019-11-19 RX ADMIN — SPIRONOLACTONE 25 MG: 50 TABLET ORAL at 05:56

## 2019-11-19 RX ADMIN — ASPIRIN 81 MG: 81 TABLET, COATED ORAL at 05:58

## 2019-11-19 RX ADMIN — INSULIN HUMAN 1 UNITS: 100 INJECTION, SOLUTION PARENTERAL at 18:32

## 2019-11-19 ASSESSMENT — ENCOUNTER SYMPTOMS
BLOOD IN STOOL: 0
DOUBLE VISION: 0
PND: 0
NEUROLOGICAL NEGATIVE: 1
DIZZINESS: 0
HEADACHES: 0
CONSTITUTIONAL NEGATIVE: 1
GASTROINTESTINAL NEGATIVE: 1
WHEEZING: 0
NAUSEA: 0
NERVOUS/ANXIOUS: 0
FEVER: 0
CHILLS: 0
HEARTBURN: 0
DEPRESSION: 0
DIARRHEA: 0
EYES NEGATIVE: 1
FOCAL WEAKNESS: 0
VOMITING: 0
CLAUDICATION: 0
COUGH: 0
MYALGIAS: 1
ABDOMINAL PAIN: 0
CARDIOVASCULAR NEGATIVE: 1
ORTHOPNEA: 0
BLURRED VISION: 0
TINGLING: 1
PALPITATIONS: 0
BRUISES/BLEEDS EASILY: 0
SENSORY CHANGE: 1
HEMOPTYSIS: 0
DIAPHORESIS: 0
RESPIRATORY NEGATIVE: 1
SEIZURES: 0
LOSS OF CONSCIOUSNESS: 0
CONSTIPATION: 0
DEPRESSION: 1

## 2019-11-19 ASSESSMENT — PAIN SCALES - WONG BAKER
WONGBAKER_NUMERICALRESPONSE: DOESN'T HURT AT ALL
WONGBAKER_NUMERICALRESPONSE: DOESN'T HURT AT ALL

## 2019-11-19 NOTE — PROGRESS NOTES
Progress Note    Pt is scheduled for PSA repair tomorrow at 1930.  Ok to have breakfast cliff prior to 0800 but keep NPO after that.  Cont ASA/plavix.      Ishaan Lechuga MD  Vascular Surgeon  [facility]  Office: [phone]

## 2019-11-19 NOTE — HEART FAILURE PROGRAM
"Cardiovascular Nurse Navigator () Advanced Heart Failure Program Inpatient Progress Note:    Per current cardiology notes this patient's known HFrEF is not acutely decompensated. Therefore, a seven day HF f/u appt is not currently indicated. Nor does the HF discharge checklist need to be used.    Patient is being primarily treated for pseudoaneurysm of right ulnar artery.    Should clinical status change to acute HF, patient will require a seven calendar day f/u appt which can be achieved by placing a \"schedule heart failure follow up appointment\" order per protocol or by calling the hospital schedulers at [phone].      Respectfully request that problems list be updated to reflect that the HF is chronic and not acutely decompensated so that staff are not confused about necessary interventions and patient does not receive a seven day HF follow up appointment which should be saved for HF patients who had exacerbations.    Thank you and please call with questions or concerns: JAMES Plascencia, RN, CHFN    "

## 2019-11-19 NOTE — PROGRESS NOTES
"      Cardiology Interval Note  Note Author: Yuri Galvez M.D.     Name Judith Vallecillo 1965   Age/Sex 54 y.o. female   MRN [id number]         After 5PM or if no immediate response to page, please call for cross-coverage  Attending/Team: Dr. Lehman/Cardiology See Patient List for primary contact information  Call [phone] to page    1st Call - Dr. Galvez          Reason for interval visit  (Principal Problem)   Pseudoaneurysm at R. Ulnar    Brief History: Patient is a 54-year-old female with history of CAD S/P 2 stent placement on 10/24/2019, HFrEF less than 30%, chronic A. fib, left MCA infarct, type II DM, hyperlipidemia, hypertension transferred from [facility] regarding right ulnar artery pseudoaneurysm level seen on ultrasound.  Please refer to Dr. Calhoun's consultation note for full details.    Interval Problem Daily Status Update  (24 hours)     -She continues to have Angel in BP into 180/90 with highest reading at 206/91.  -Has significant pain at L. Wrist. Pt in tears during interview. Continues to have numbness, tingling and swelling have been stable per pt.  -U/S of R. Ulnar showed \"Pseudoaneurysm measures 4.32 cm x 1.8 cm saggitally and 2.3 cm x 2.03 cm  transverse, the neck measures 0.25 cm wide with no length.\"      Review of Systems   Constitutional: Negative for chills and fever.   Eyes: Negative for blurred vision and double vision.   Respiratory: Negative for cough and hemoptysis.    Cardiovascular: Negative for chest pain, palpitations, orthopnea, claudication, leg swelling and PND.   Gastrointestinal: Negative for heartburn and nausea.   Genitourinary: Negative for dysuria and urgency.   Neurological: Positive for tingling and sensory change. Negative for dizziness and headaches.        Numbness, tingling and weakness at R. 4th and 5th fingers going to R. Wrist   Psychiatric/Behavioral: Negative for depression and suicidal ideas.         Physical Exam "       Vitals:    11/19/19 0342 11/19/19 0827 11/19/19 0847 11/19/19 1011   BP: 147/105 (!) 180/77 (!) 173/76 (!) 170/85   Pulse: 75  83 79   Resp: (!) 21 16 16    Temp: 37.1 °C (98.7 °F)  36.7 °C (98.1 °F)    TempSrc: Temporal  Temporal    SpO2: 96% 94% 94%    Weight:       Height:         Body mass index is 34.72 kg/m².    Oxygen Therapy:  Pulse Oximetry: 94 %, O2 (LPM): 0, O2 Delivery: None (Room Air)    Physical Exam   Constitutional: She is oriented to person, place, and time. No distress.   HENT:   Head: Normocephalic and atraumatic.   Eyes: Pupils are equal, round, and reactive to light. EOM are normal.   Neck: Normal range of motion. Neck supple. No thyromegaly present.   Cardiovascular: Normal rate.   No murmur heard.  Irregularly Irregular Rhythm    Pulmonary/Chest: Effort normal and breath sounds normal. No respiratory distress.   Abdominal: Soft. Bowel sounds are normal. She exhibits no distension.   Musculoskeletal: Normal range of motion.         General: No deformity.      Comments: Mild edema without bruising or discoloration Medical side of R. Wrist    Neurological: She is alert and oriented to person, place, and time. No cranial nerve deficit.   Skin: Skin is warm and dry. She is not diaphoretic. No erythema.   Psychiatric: Mood, memory, affect and judgment normal.         Lab Data Review:   11/18/2019  10:13 AM    Recent Labs     11/18/19 0207 11/19/19 0353   SODIUM 141 141   POTASSIUM 3.6 3.4*   CHLORIDE 110 108   CO2 23 23   BUN 14 15   CREATININE 0.93 0.98   CALCIUM 8.8 8.8       Recent Labs     11/18/19 0207 11/19/19 0353   ALTSGPT 12  --    ASTSGOT 15  --    ALKPHOSPHAT 108*  --    TBILIRUBIN 0.9  --    GLUCOSE 102* 140*       Recent Labs     11/18/19 0207 11/19/19 0353   RBC 3.33* 3.42*   HEMOGLOBIN 9.9* 10.1*   HEMATOCRIT 32.2* 32.4*   PLATELETCT 277 266   PROTHROMBTM 24.1*  --    APTT 38.3*  --    INR 2.08*  --        Recent Labs     11/18/19  0207 11/19/19  0353   WBC 5.5 5.0  "  NEUTSPOLYS 65.50 69.40   LYMPHOCYTES 20.20* 17.00*   MONOCYTES 10.50 10.60   EOSINOPHILS 2.50 2.20   BASOPHILS 1.30 0.60   ASTSGOT 15  --    ALTSGPT 12  --    ALKPHOSPHAT 108*  --    TBILIRUBIN 0.9  --            Assessment/Plan    #Pseudoaneurysm at R. Wrist  #Status post cath on 10/24/2019  #Chronic systolic heart failure, NYHA class III, stage C  #Chronic A. fib with left atrial thrombus per SONIA on 10/26/2019  #CAD s/p cardiac cath 2 stents placed 10/24/2019  #Ischemic CVA   #Hypertension  #Dyslipidemia  #Previous smoker stopped 1 month ago  #Hypokalemia     Patient is a 54-year-old female with significant cardiac history with coronary artery disease s/p 2 stents placed, HFrEF less than 30%, ischemic CVA presented with pseudoaneurysm of the right ulnar artery.  -U/S of R. Ulnar showed \"Pseudoaneurysm measures 4.32 cm x 1.8 cm saggitally and 2.3 cm x 2.03 cm  transverse, the neck measures 0.25 cm wide with no length.\"  -Pt's blood pressure spike is likely secondary to Pain  -Pain management per medicine team    Recommendations:  -Will increase amlodipine to 10  -Replacing Potassium  -Vascular Surgery on Board  -Continues Plavix and ASA. Hold Xarelto        "

## 2019-11-19 NOTE — CARE PLAN
Pt instructed on use of call light, pt agrees to call when needing to get up. Call light and personal belongings within reach of pt. Pt denies any needs at this time. Will continue to monitor.

## 2019-11-19 NOTE — PROGRESS NOTES
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 She has hx of Afib (on xarelto), CHF, CVA, HTN, and HLD.     She had a cardiac cath w/ two KAYCE placed in the LAD (on ASA/plavix) via R ulnar access on 10/24/2019 and states that she initially did fine. About 4 days later, she noticed swelling and pain at the access site. A duplex from OSH (neither images nor report available) suggested a PSA.     She currently has mild wrist swelling, but significant pain and numbness of the 4th and 5th fingers. She denies any motor deficits.    Medications    She    Physical Exam  Unchanged from yesterday    Pulse/Extremity Exam:  Palpable right brachial and radial. Ulnar not palpable. Hand is warm with good cap refill. Tenderness of the right wrist, ulnar aspect. 5/5      Wounds: None     General appearance: NAD, conversing appropriately  Psych: Normal affect, mood, judgement  Neuro: CN II-XII grossly intact.   Neck: full range of motion  Lungs: No inspiratory stridor or wheezing  CV: RRR  Abdomen: Soft, NT/ND  Skin: No rashes    Imaging Reviewed Today:     Arterial Duplex: still pending    Assessment/Plan & Medical Decision-Making  54F admitted w/ right wrist pain and finger numbness after cath via ulnar access almost 1mo ago.      Would hold xarelto if able. Would continue ASA/plavix given recent KAYCE to the LAD.     Plan to follow once imaging obtained still unavailable at this time.    Rica Padilla PA-C for [facility]  Vascular Surgeon  [facility]  Office: [phone]      Vascular Attending Addendum  Arterial duplex completed. I personally reviewed imaging. There is a patent PSA off the ulnar artery at the wrist with active flow and no neck. Given nerve compression symptoms, will plan on repair.    There is no OR  availability today. Attempting to schedule for cliff.    Ishaan Lechuga MD

## 2019-11-19 NOTE — CARE PLAN
Problem: Infection  Goal: Will remain free from infection  Outcome: PROGRESSING AS EXPECTED  Intervention: Assess signs and symptoms of infection  Note:   Practice aseptic technique.     Problem: Medication  Goal: Compliance with prescribed medication will improve  Outcome: PROGRESSING AS EXPECTED  Intervention: Assess and address patient's barriers to compliance with prescribed medication regimen  Note:   Explain plan of care.

## 2019-11-20 ENCOUNTER — ANESTHESIA (OUTPATIENT)
Dept: SURGERY | Facility: MEDICAL CENTER | Age: 54
End: 2019-11-20
Payer: COMMERCIAL

## 2019-11-20 LAB
ANION GAP SERPL CALC-SCNC: 8 MMOL/L (ref 0–11.9)
BUN SERPL-MCNC: 10 MG/DL (ref 8–22)
CALCIUM SERPL-MCNC: 9.3 MG/DL (ref 8.5–10.5)
CHLORIDE SERPL-SCNC: 110 MMOL/L (ref 96–112)
CO2 SERPL-SCNC: 25 MMOL/L (ref 20–33)
CREAT SERPL-MCNC: 0.98 MG/DL (ref 0.5–1.4)
ERYTHROCYTE [DISTWIDTH] IN BLOOD BY AUTOMATED COUNT: 54.9 FL (ref 35.9–50)
GLUCOSE BLD-MCNC: 112 MG/DL (ref 65–99)
GLUCOSE BLD-MCNC: 116 MG/DL (ref 65–99)
GLUCOSE BLD-MCNC: 117 MG/DL (ref 65–99)
GLUCOSE BLD-MCNC: 138 MG/DL (ref 65–99)
GLUCOSE SERPL-MCNC: 132 MG/DL (ref 65–99)
HCT VFR BLD AUTO: 33.8 % (ref 37–47)
HGB BLD-MCNC: 10.3 G/DL (ref 12–16)
MCH RBC QN AUTO: 29.3 PG (ref 27–33)
MCHC RBC AUTO-ENTMCNC: 30.5 G/DL (ref 33.6–35)
MCV RBC AUTO: 96.3 FL (ref 81.4–97.8)
PLATELET # BLD AUTO: 270 K/UL (ref 164–446)
PMV BLD AUTO: 10.1 FL (ref 9–12.9)
POTASSIUM SERPL-SCNC: 4 MMOL/L (ref 3.6–5.5)
RBC # BLD AUTO: 3.51 M/UL (ref 4.2–5.4)
SODIUM SERPL-SCNC: 143 MMOL/L (ref 135–145)
WBC # BLD AUTO: 4.6 K/UL (ref 4.8–10.8)

## 2019-11-20 PROCEDURE — 80048 BASIC METABOLIC PNL TOTAL CA: CPT

## 2019-11-20 PROCEDURE — 160035 HCHG PACU - 1ST 60 MINS PHASE I: Performed by: SURGERY

## 2019-11-20 PROCEDURE — A9270 NON-COVERED ITEM OR SERVICE: HCPCS | Performed by: STUDENT IN AN ORGANIZED HEALTH CARE EDUCATION/TRAINING PROGRAM

## 2019-11-20 PROCEDURE — 700101 HCHG RX REV CODE 250: Performed by: ANESTHESIOLOGY

## 2019-11-20 PROCEDURE — 160002 HCHG RECOVERY MINUTES (STAT): Performed by: SURGERY

## 2019-11-20 PROCEDURE — 96376 TX/PRO/DX INJ SAME DRUG ADON: CPT

## 2019-11-20 PROCEDURE — 700111 HCHG RX REV CODE 636 W/ 250 OVERRIDE (IP): Performed by: HOSPITALIST

## 2019-11-20 PROCEDURE — 501838 HCHG SUTURE GENERAL: Performed by: SURGERY

## 2019-11-20 PROCEDURE — 501837 HCHG SUTURE CV: Performed by: SURGERY

## 2019-11-20 PROCEDURE — 82962 GLUCOSE BLOOD TEST: CPT

## 2019-11-20 PROCEDURE — 160029 HCHG SURGERY MINUTES - 1ST 30 MINS LEVEL 4: Performed by: SURGERY

## 2019-11-20 PROCEDURE — 99214 OFFICE O/P EST MOD 30 MIN: CPT | Mod: GC | Performed by: INTERNAL MEDICINE

## 2019-11-20 PROCEDURE — 700102 HCHG RX REV CODE 250 W/ 637 OVERRIDE(OP): Performed by: NURSE PRACTITIONER

## 2019-11-20 PROCEDURE — 700101 HCHG RX REV CODE 250: Performed by: SURGERY

## 2019-11-20 PROCEDURE — 160041 HCHG SURGERY MINUTES - EA ADDL 1 MIN LEVEL 4: Performed by: SURGERY

## 2019-11-20 PROCEDURE — 99226 PR SUBSEQUENT OBSERVATION CARE,LEVEL III: CPT | Performed by: HOSPITALIST

## 2019-11-20 PROCEDURE — 700105 HCHG RX REV CODE 258: Performed by: ANESTHESIOLOGY

## 2019-11-20 PROCEDURE — 700111 HCHG RX REV CODE 636 W/ 250 OVERRIDE (IP): Performed by: SURGERY

## 2019-11-20 PROCEDURE — 88304 TISSUE EXAM BY PATHOLOGIST: CPT

## 2019-11-20 PROCEDURE — 700102 HCHG RX REV CODE 250 W/ 637 OVERRIDE(OP): Performed by: INTERNAL MEDICINE

## 2019-11-20 PROCEDURE — 700111 HCHG RX REV CODE 636 W/ 250 OVERRIDE (IP): Mod: JW | Performed by: ANESTHESIOLOGY

## 2019-11-20 PROCEDURE — 36415 COLL VENOUS BLD VENIPUNCTURE: CPT

## 2019-11-20 PROCEDURE — G0378 HOSPITAL OBSERVATION PER HR: HCPCS

## 2019-11-20 PROCEDURE — 700102 HCHG RX REV CODE 250 W/ 637 OVERRIDE(OP): Performed by: ANESTHESIOLOGY

## 2019-11-20 PROCEDURE — A6402 STERILE GAUZE <= 16 SQ IN: HCPCS | Performed by: SURGERY

## 2019-11-20 PROCEDURE — A9270 NON-COVERED ITEM OR SERVICE: HCPCS | Performed by: NURSE PRACTITIONER

## 2019-11-20 PROCEDURE — 160048 HCHG OR STATISTICAL LEVEL 1-5: Performed by: SURGERY

## 2019-11-20 PROCEDURE — 96375 TX/PRO/DX INJ NEW DRUG ADDON: CPT

## 2019-11-20 PROCEDURE — 160022 HCHG BLOCK: Performed by: SURGERY

## 2019-11-20 PROCEDURE — A9270 NON-COVERED ITEM OR SERVICE: HCPCS | Performed by: INTERNAL MEDICINE

## 2019-11-20 PROCEDURE — 160009 HCHG ANES TIME/MIN: Performed by: SURGERY

## 2019-11-20 PROCEDURE — 700102 HCHG RX REV CODE 250 W/ 637 OVERRIDE(OP): Performed by: STUDENT IN AN ORGANIZED HEALTH CARE EDUCATION/TRAINING PROGRAM

## 2019-11-20 PROCEDURE — A9270 NON-COVERED ITEM OR SERVICE: HCPCS | Performed by: ANESTHESIOLOGY

## 2019-11-20 PROCEDURE — 85027 COMPLETE CBC AUTOMATED: CPT

## 2019-11-20 RX ORDER — IPRATROPIUM BROMIDE AND ALBUTEROL SULFATE 2.5; .5 MG/3ML; MG/3ML
3 SOLUTION RESPIRATORY (INHALATION)
Status: DISCONTINUED | OUTPATIENT
Start: 2019-11-20 | End: 2019-11-20 | Stop reason: HOSPADM

## 2019-11-20 RX ORDER — SCOLOPAMINE TRANSDERMAL SYSTEM 1 MG/1
1 PATCH, EXTENDED RELEASE TRANSDERMAL ONCE
Status: DISCONTINUED | OUTPATIENT
Start: 2019-11-20 | End: 2019-11-21 | Stop reason: HOSPADM

## 2019-11-20 RX ORDER — OXYCODONE HCL 5 MG/5 ML
5 SOLUTION, ORAL ORAL
Status: DISCONTINUED | OUTPATIENT
Start: 2019-11-20 | End: 2019-11-20 | Stop reason: HOSPADM

## 2019-11-20 RX ORDER — HEPARIN SODIUM,PORCINE 1000/ML
VIAL (ML) INJECTION
Status: DISCONTINUED | OUTPATIENT
Start: 2019-11-20 | End: 2019-11-20 | Stop reason: HOSPADM

## 2019-11-20 RX ORDER — DEXMEDETOMIDINE HYDROCHLORIDE 100 UG/ML
INJECTION, SOLUTION INTRAVENOUS PRN
Status: DISCONTINUED | OUTPATIENT
Start: 2019-11-20 | End: 2019-11-20 | Stop reason: SURG

## 2019-11-20 RX ORDER — ROPIVACAINE HYDROCHLORIDE 5 MG/ML
INJECTION, SOLUTION EPIDURAL; INFILTRATION; PERINEURAL PRN
Status: DISCONTINUED | OUTPATIENT
Start: 2019-11-20 | End: 2019-11-20 | Stop reason: SURG

## 2019-11-20 RX ORDER — BUPIVACAINE HYDROCHLORIDE AND EPINEPHRINE 5; 5 MG/ML; UG/ML
INJECTION, SOLUTION EPIDURAL; INTRACAUDAL; PERINEURAL
Status: DISCONTINUED | OUTPATIENT
Start: 2019-11-20 | End: 2019-11-20 | Stop reason: HOSPADM

## 2019-11-20 RX ORDER — SODIUM CHLORIDE, SODIUM LACTATE, POTASSIUM CHLORIDE, CALCIUM CHLORIDE 600; 310; 30; 20 MG/100ML; MG/100ML; MG/100ML; MG/100ML
INJECTION, SOLUTION INTRAVENOUS
Status: DISCONTINUED | OUTPATIENT
Start: 2019-11-20 | End: 2019-11-20 | Stop reason: SURG

## 2019-11-20 RX ORDER — HALOPERIDOL 5 MG/ML
1 INJECTION INTRAMUSCULAR
Status: DISCONTINUED | OUTPATIENT
Start: 2019-11-20 | End: 2019-11-20 | Stop reason: HOSPADM

## 2019-11-20 RX ORDER — ONDANSETRON 2 MG/ML
4 INJECTION INTRAMUSCULAR; INTRAVENOUS
Status: DISCONTINUED | OUTPATIENT
Start: 2019-11-20 | End: 2019-11-20 | Stop reason: HOSPADM

## 2019-11-20 RX ORDER — TRAMADOL HYDROCHLORIDE 50 MG/1
25 TABLET ORAL EVERY 6 HOURS PRN
Status: DISCONTINUED | OUTPATIENT
Start: 2019-11-20 | End: 2019-11-21 | Stop reason: HOSPADM

## 2019-11-20 RX ORDER — KETOROLAC TROMETHAMINE 30 MG/ML
15 INJECTION, SOLUTION INTRAMUSCULAR; INTRAVENOUS ONCE
Status: DISCONTINUED | OUTPATIENT
Start: 2019-11-20 | End: 2019-11-20 | Stop reason: HOSPADM

## 2019-11-20 RX ORDER — CELECOXIB 200 MG/1
200 CAPSULE ORAL ONCE
Status: COMPLETED | OUTPATIENT
Start: 2019-11-20 | End: 2019-11-20

## 2019-11-20 RX ORDER — OXYCODONE HCL 5 MG/5 ML
10 SOLUTION, ORAL ORAL
Status: DISCONTINUED | OUTPATIENT
Start: 2019-11-20 | End: 2019-11-20 | Stop reason: HOSPADM

## 2019-11-20 RX ORDER — ROPIVACAINE HYDROCHLORIDE 5 MG/ML
INJECTION, SOLUTION EPIDURAL; INFILTRATION; PERINEURAL
Status: COMPLETED
Start: 2019-11-20 | End: 2019-11-20

## 2019-11-20 RX ORDER — HYDROMORPHONE HYDROCHLORIDE 1 MG/ML
0.2 INJECTION, SOLUTION INTRAMUSCULAR; INTRAVENOUS; SUBCUTANEOUS
Status: DISCONTINUED | OUTPATIENT
Start: 2019-11-20 | End: 2019-11-20 | Stop reason: HOSPADM

## 2019-11-20 RX ORDER — METOPROLOL TARTRATE 1 MG/ML
1 INJECTION, SOLUTION INTRAVENOUS
Status: DISCONTINUED | OUTPATIENT
Start: 2019-11-20 | End: 2019-11-20 | Stop reason: HOSPADM

## 2019-11-20 RX ORDER — LABETALOL HYDROCHLORIDE 5 MG/ML
5 INJECTION, SOLUTION INTRAVENOUS
Status: DISCONTINUED | OUTPATIENT
Start: 2019-11-20 | End: 2019-11-20 | Stop reason: HOSPADM

## 2019-11-20 RX ORDER — ACETAMINOPHEN 500 MG
1000 TABLET ORAL ONCE
Status: COMPLETED | OUTPATIENT
Start: 2019-11-20 | End: 2019-11-20

## 2019-11-20 RX ORDER — CEFAZOLIN SODIUM 1 G/3ML
INJECTION, POWDER, FOR SOLUTION INTRAMUSCULAR; INTRAVENOUS PRN
Status: DISCONTINUED | OUTPATIENT
Start: 2019-11-20 | End: 2019-11-20 | Stop reason: SURG

## 2019-11-20 RX ORDER — HYDROMORPHONE HYDROCHLORIDE 1 MG/ML
0.1 INJECTION, SOLUTION INTRAMUSCULAR; INTRAVENOUS; SUBCUTANEOUS
Status: DISCONTINUED | OUTPATIENT
Start: 2019-11-20 | End: 2019-11-20 | Stop reason: HOSPADM

## 2019-11-20 RX ADMIN — ATORVASTATIN CALCIUM 40 MG: 40 TABLET, FILM COATED ORAL at 22:06

## 2019-11-20 RX ADMIN — SPIRONOLACTONE 25 MG: 50 TABLET ORAL at 05:20

## 2019-11-20 RX ADMIN — ASPIRIN 81 MG: 81 TABLET, COATED ORAL at 05:20

## 2019-11-20 RX ADMIN — ROPIVACAINE HYDROCHLORIDE 10 ML: 5 INJECTION, SOLUTION EPIDURAL; INFILTRATION; PERINEURAL at 19:30

## 2019-11-20 RX ADMIN — CEFAZOLIN 2 G: 330 INJECTION, POWDER, FOR SOLUTION INTRAMUSCULAR; INTRAVENOUS at 19:45

## 2019-11-20 RX ADMIN — PROPOFOL 10 MG: 10 INJECTION, EMULSION INTRAVENOUS at 19:55

## 2019-11-20 RX ADMIN — DEXMEDETOMIDINE HYDROCHLORIDE 20 MCG: 100 INJECTION, SOLUTION INTRAVENOUS at 19:40

## 2019-11-20 RX ADMIN — DEXMEDETOMIDINE HYDROCHLORIDE 20 MCG: 100 INJECTION, SOLUTION INTRAVENOUS at 20:07

## 2019-11-20 RX ADMIN — SCOPALAMINE 1 PATCH: 1 PATCH, EXTENDED RELEASE TRANSDERMAL at 18:57

## 2019-11-20 RX ADMIN — ROPIVACAINE HYDROCHLORIDE 10 ML: 5 INJECTION, SOLUTION EPIDURAL; INFILTRATION; PERINEURAL at 19:20

## 2019-11-20 RX ADMIN — FENTANYL CITRATE 50 MCG: 50 INJECTION, SOLUTION INTRAMUSCULAR; INTRAVENOUS at 19:55

## 2019-11-20 RX ADMIN — FENTANYL CITRATE 100 MCG: 50 INJECTION, SOLUTION INTRAMUSCULAR; INTRAVENOUS at 19:45

## 2019-11-20 RX ADMIN — AMLODIPINE BESYLATE 10 MG: 10 TABLET ORAL at 05:19

## 2019-11-20 RX ADMIN — DIGOXIN 250 MCG: 250 TABLET ORAL at 22:06

## 2019-11-20 RX ADMIN — CELECOXIB 200 MG: 200 CAPSULE ORAL at 18:57

## 2019-11-20 RX ADMIN — POTASSIUM CHLORIDE 20 MEQ: 1500 TABLET, EXTENDED RELEASE ORAL at 05:20

## 2019-11-20 RX ADMIN — LOSARTAN POTASSIUM 100 MG: 50 TABLET, FILM COATED ORAL at 05:19

## 2019-11-20 RX ADMIN — PROPOFOL 10 MG: 10 INJECTION, EMULSION INTRAVENOUS at 19:44

## 2019-11-20 RX ADMIN — DEXMEDETOMIDINE HYDROCHLORIDE 20 MCG: 100 INJECTION, SOLUTION INTRAVENOUS at 19:15

## 2019-11-20 RX ADMIN — ROPIVACAINE HYDROCHLORIDE 10 ML: 5 INJECTION, SOLUTION EPIDURAL; INFILTRATION; PERINEURAL at 19:22

## 2019-11-20 RX ADMIN — ROPIVACAINE HYDROCHLORIDE 10 ML: 5 INJECTION, SOLUTION EPIDURAL; INFILTRATION; PERINEURAL at 19:21

## 2019-11-20 RX ADMIN — ACETAMINOPHEN 1000 MG: 500 TABLET ORAL at 18:58

## 2019-11-20 RX ADMIN — LABETALOL HYDROCHLORIDE 10 MG: 5 INJECTION INTRAVENOUS at 12:27

## 2019-11-20 RX ADMIN — METOPROLOL SUCCINATE 200 MG: 100 TABLET, FILM COATED, EXTENDED RELEASE ORAL at 06:00

## 2019-11-20 RX ADMIN — FENTANYL CITRATE 50 MCG: 50 INJECTION INTRAMUSCULAR; INTRAVENOUS at 09:24

## 2019-11-20 RX ADMIN — FENTANYL CITRATE 100 MCG: 50 INJECTION, SOLUTION INTRAMUSCULAR; INTRAVENOUS at 19:40

## 2019-11-20 RX ADMIN — SODIUM CHLORIDE, POTASSIUM CHLORIDE, SODIUM LACTATE AND CALCIUM CHLORIDE: 600; 310; 30; 20 INJECTION, SOLUTION INTRAVENOUS at 19:15

## 2019-11-20 ASSESSMENT — ENCOUNTER SYMPTOMS
EYE REDNESS: 0
ORTHOPNEA: 0
DIZZINESS: 0
EYES NEGATIVE: 1
NAUSEA: 0
HEADACHES: 0
RESPIRATORY NEGATIVE: 1
NEUROLOGICAL NEGATIVE: 1
TINGLING: 1
DIAPHORESIS: 0
CLAUDICATION: 0
CONSTIPATION: 0
MYALGIAS: 1
EYE DISCHARGE: 0
WEAKNESS: 0
WEIGHT LOSS: 0
HEARTBURN: 0
PSYCHIATRIC NEGATIVE: 1
DEPRESSION: 0
SENSORY CHANGE: 1
FEVER: 0
PND: 0
CHILLS: 0
SHORTNESS OF BREATH: 0
DOUBLE VISION: 0
CONSTITUTIONAL NEGATIVE: 1
HEMOPTYSIS: 0
SORE THROAT: 0
POLYDIPSIA: 0
SEIZURES: 0
PALPITATIONS: 0
BLURRED VISION: 0
BACK PAIN: 0
COUGH: 0
GASTROINTESTINAL NEGATIVE: 1
VOMITING: 0
INSOMNIA: 0
CARDIOVASCULAR NEGATIVE: 1
PHOTOPHOBIA: 0

## 2019-11-20 ASSESSMENT — PAIN SCALES - GENERAL: PAIN_LEVEL: 0

## 2019-11-20 ASSESSMENT — LIFESTYLE VARIABLES: SUBSTANCE_ABUSE: 0

## 2019-11-20 ASSESSMENT — PAIN SCALES - WONG BAKER: WONGBAKER_NUMERICALRESPONSE: DOESN'T HURT AT ALL

## 2019-11-20 NOTE — PROGRESS NOTES
IV infiltrated. New PIV established. Pt medicated for pain with prn fentanyl. Will continue to monitor.

## 2019-11-20 NOTE — CARE PLAN
Problem: Pain Management  Goal: Pain level will decrease to patient's comfort goal  Outcome: PROGRESSING AS EXPECTED     Problem: Knowledge Deficit  Goal: Knowledge of the prescribed therapeutic regimen will improve  Outcome: PROGRESSING AS EXPECTED     Problem: Safety  Goal: Will remain free from injury  Outcome: PROGRESSING AS EXPECTED  Goal: Will remain free from falls  Outcome: PROGRESSING AS EXPECTED

## 2019-11-20 NOTE — PROGRESS NOTES
Patient admitted to -[facility] . Tele box on, patient assessed, vital signs stable. Oriented patient to room, skylight, and how to use call light. Call light within reach, treaded socks on.

## 2019-11-20 NOTE — PROGRESS NOTES
[facility] Medicine Daily Progress Note    Date of Service  11/20/2019    Chief Complaint  54 y.o. female admitted 11/18/2019 with right wrist pain.    Hospital Course    11/19/2019 patient was initially admitted after patient underwent PCI for coronary artery disease.  The patient's right wrist has developed at this point a pseudoaneurysm that has given her quite amount of pain.  Vascular surgery has taken a look at her and ultrasound proves that the patient does have a pseudoaneurysm.  Patient is scheduled for surgical repair in 1930 tomorrow night.  11/20/2019-this morning patient is complaining of worse pain in the right wrist 8 out of 10.  IV fentanyl has been prescribed.  Patient is pending surgery this evening.  Patient will be continued on IV fentanyl for pain management.      Interval Problem Update  N.p.o. as of this morning.  Skin surgery scheduled for 1930 tonight  IV fentanyl for pain management  Continue at this point with medication optimization for coronary artery disease  Depression seems to be better this morning.    Consultants/Specialty  Vascular surgery  Cardiology    Code Status  Full code    Disposition  To be determined    Review of Systems  Review of Systems   Constitutional: Negative.  Negative for diaphoresis, fever and weight loss.   HENT: Negative.  Negative for congestion, sore throat and tinnitus.    Eyes: Negative.  Negative for photophobia, discharge and redness.   Respiratory: Negative.  Negative for cough and shortness of breath.    Cardiovascular: Negative.  Negative for chest pain, claudication and leg swelling.   Gastrointestinal: Negative.  Negative for constipation, melena, nausea and vomiting.   Genitourinary: Negative.  Negative for dysuria, frequency and hematuria.   Musculoskeletal: Positive for joint pain (Right wrist) and myalgias. Negative for back pain.   Skin: Negative.  Negative for itching and rash.   Neurological: Negative.  Negative for dizziness, seizures, weakness  and headaches.   Endo/Heme/Allergies: Negative.  Negative for environmental allergies and polydipsia.   Psychiatric/Behavioral: Negative.  Negative for depression, substance abuse and suicidal ideas. The patient does not have insomnia.    All other systems reviewed and are negative.       Physical Exam  Temp:  [36.2 °C (97.2 °F)-36.6 °C (97.9 °F)] 36.2 °C (97.2 °F)  Pulse:  [77-93] 77  Resp:  [16-19] 16  BP: (130-177)/(72-98) 177/74  SpO2:  [94 %-96 %] 96 %    Physical Exam  Vitals signs and nursing note reviewed.   Constitutional:       Appearance: Normal appearance. She is well-developed. She is obese.   HENT:      Head: Normocephalic and atraumatic.      Right Ear: External ear normal.      Left Ear: External ear normal.      Nose: Nose normal. No congestion or rhinorrhea.      Mouth/Throat:      Mouth: Mucous membranes are moist.      Pharynx: Oropharynx is clear. No oropharyngeal exudate or posterior oropharyngeal erythema.   Eyes:      General:         Right eye: No discharge.         Left eye: No discharge.      Extraocular Movements: Extraocular movements intact.      Conjunctiva/sclera: Conjunctivae normal.      Pupils: Pupils are equal, round, and reactive to light.   Neck:      Musculoskeletal: Normal range of motion and neck supple.      Thyroid: No thyromegaly.      Vascular: No JVD.   Cardiovascular:      Rate and Rhythm: Normal rate and regular rhythm.      Pulses: Normal pulses.      Heart sounds: Murmur present. No friction rub. No gallop.    Pulmonary:      Effort: Pulmonary effort is normal. No respiratory distress.      Breath sounds: Normal breath sounds. No stridor.   Abdominal:      General: Abdomen is flat. Bowel sounds are normal. There is no distension.      Palpations: There is no mass.   Genitourinary:     Comments: Ngo catheter  Musculoskeletal: Normal range of motion.         General: Swelling (Right wrist) and tenderness (Right wrist 8/10) present.   Lymphadenopathy:      Cervical:  No cervical adenopathy.   Skin:     General: Skin is warm and dry.      Coloration: Skin is not jaundiced or pale.      Findings: No bruising, erythema or rash.   Neurological:      General: No focal deficit present.      Mental Status: She is alert and oriented to person, place, and time. Mental status is at baseline.      Cranial Nerves: No cranial nerve deficit.      Motor: No weakness.      Deep Tendon Reflexes: Reflexes are normal and symmetric.   Psychiatric:         Mood and Affect: Mood normal.         Behavior: Behavior normal.         Thought Content: Thought content normal.         Judgment: Judgment normal.         Fluids    Intake/Output Summary (Last 24 hours) at 11/20/2019 0904  Last data filed at 11/19/2019 1900  Gross per 24 hour   Intake 400 ml   Output --   Net 400 ml       Laboratory  Recent Labs     11/18/19 0207 11/19/19 0353   WBC 5.5 5.0   RBC 3.33* 3.42*   HEMOGLOBIN 9.9* 10.1*   HEMATOCRIT 32.2* 32.4*   MCV 96.7 94.7   MCH 29.7 29.5   MCHC 30.7* 31.2*   RDW 55.5* 54.4*   PLATELETCT 277 266   MPV 10.6 10.2     Recent Labs     11/18/19 0207 11/19/19 0353   SODIUM 141 141   POTASSIUM 3.6 3.4*   CHLORIDE 110 108   CO2 23 23   GLUCOSE 102* 140*   BUN 14 15   CREATININE 0.93 0.98   CALCIUM 8.8 8.8     Recent Labs     11/18/19 0207   APTT 38.3*   INR 2.08*               Imaging  US-EXTREMITY ARTERY UPPER UNILAT RIGHT   Final Result           Assessment/Plan  Aneurysm of right ulnar artery (HCC)- (present on admission)  Assessment & Plan  The patient after coronary angiography has developed a pseudoaneurysm of the right ulnar artery.  Patient is currently n.p.o.  Patient will be going to surgery at 1930 today.  Patient's pain today is 8 out of 10 we will give her some IV fentanyl to control the pain.      Acute combined systolic and diastolic heart failure (HCC)- (present on admission)  Assessment & Plan  Patient currently does not complain of shortness of breath or chest pain.  Fluid status  at this point is optimized  Continue at this point with a combination of spironolactone 25 mg and metoprolol  mg as well as Cozaar 100 mg.    AF (atrial fibrillation) ([facility])- (present on admission)  Assessment & Plan  Currently rate controlled with metoprolol  mg daily as well as digoxin 250 mcg daily.  No anticoagulation with surgery.  After surgery the patient may need to be on anticoagulation.    Coronary artery disease involving native coronary artery of native heart without angina pectoris- (present on admission)  Assessment & Plan  Continue at this point with medical optimization of her coronary artery disease.  The patient is status post PCI and she has no chest pain.  Continue at this point with Lipitor, metoprolol, losartan, and aspirin and Plavix.      Obesity- (present on admission)  Assessment & Plan  Body mass index is 34.72 kg/m².  Patient will need outpatient weight loss management.      Type 2 diabetes mellitus with hyperglycemia, without long-term current use of insulin ([facility])- (present on admission)  Assessment & Plan  -accus with sliding scale coverage, continue with most recent blood sugars 140  -diabetic diet on hold for surgery tonight  -diabetic education provided  -follow glycohemoglobin levels long term  -continue with oral antihyperglycemics  -monitor for hypoglycemic episodes and adjust control if he should get low         VTE prophylaxis: SCDs

## 2019-11-20 NOTE — PROGRESS NOTES
[facility] Medicine Daily Progress Note    Date of Service  11/19/2019    Chief Complaint  54 y.o. female admitted 11/18/2019 with right wrist pain.    Hospital Course    Patient was initially admitted after patient underwent PCI for coronary artery disease.  The patient's right wrist has developed at this point a pseudoaneurysm that has given her quite amount of pain.  Vascular surgery has taken a look at her and ultrasound proves that the patient does have a pseudoaneurysm.  Patient is scheduled for surgical repair in 1930 tomorrow night.      Interval Problem Update  N.p.o. after breakfast tomorrow, with surgery at 1930  Pain management  Optimize medication management  Monitor for depression    Consultants/Specialty  Vascular surgery  Cardiology    Code Status  Full code    Disposition  To be determined    Review of Systems  Review of Systems   Constitutional: Negative.  Negative for chills, diaphoresis and fever.   HENT: Negative.    Eyes: Negative.  Negative for double vision.   Respiratory: Negative.  Negative for cough, hemoptysis and wheezing.    Cardiovascular: Negative.  Negative for chest pain, palpitations and leg swelling.   Gastrointestinal: Negative.  Negative for abdominal pain, blood in stool, constipation, diarrhea, heartburn, nausea and vomiting.   Genitourinary: Negative.  Negative for frequency, hematuria and urgency.   Musculoskeletal: Positive for myalgias (Right forearm). Negative for joint pain.   Skin: Negative.  Negative for itching and rash.   Neurological: Negative.  Negative for dizziness, focal weakness, seizures, loss of consciousness and headaches.   Endo/Heme/Allergies: Negative.  Does not bruise/bleed easily.   Psychiatric/Behavioral: Positive for depression. Negative for suicidal ideas. The patient is not nervous/anxious.    All other systems reviewed and are negative.       Physical Exam  Temp:  [36.2 °C (97.2 °F)-37.1 °C (98.7 °F)] 36.2 °C (97.2 °F)  Pulse:  [] 77  Resp:   [16-22] 19  BP: (130-190)/() 130/72  SpO2:  [94 %-97 %] 94 %    Physical Exam  Vitals signs and nursing note reviewed.   Constitutional:       Appearance: Normal appearance. She is well-developed.   HENT:      Head: Normocephalic and atraumatic.      Right Ear: External ear normal.      Left Ear: External ear normal.      Nose: Nose normal.      Mouth/Throat:      Mouth: Mucous membranes are moist.      Pharynx: Oropharynx is clear.   Eyes:      Conjunctiva/sclera: Conjunctivae normal.      Pupils: Pupils are equal, round, and reactive to light.   Neck:      Musculoskeletal: Normal range of motion and neck supple.      Thyroid: No thyromegaly.      Vascular: No JVD.   Cardiovascular:      Rate and Rhythm: Normal rate and regular rhythm.      Pulses: Normal pulses.      Heart sounds: Murmur present.   Pulmonary:      Effort: Pulmonary effort is normal.      Breath sounds: Normal breath sounds. No wheezing or rales.   Chest:      Chest wall: No tenderness.   Abdominal:      General: Abdomen is flat. Bowel sounds are normal. There is no distension.      Palpations: There is no mass.      Tenderness: There is no tenderness. There is no guarding or rebound.   Genitourinary:     Comments: Ngo catheter  Musculoskeletal: Normal range of motion.         General: Tenderness (Right wrist) present.   Lymphadenopathy:      Cervical: No cervical adenopathy.   Skin:     General: Skin is warm and dry.      Coloration: Skin is not pale.      Findings: No erythema or rash.   Neurological:      General: No focal deficit present.      Mental Status: She is alert and oriented to person, place, and time. Mental status is at baseline.      Cranial Nerves: No cranial nerve deficit.      Deep Tendon Reflexes: Reflexes are normal and symmetric.   Psychiatric:         Mood and Affect: Mood normal.         Behavior: Behavior normal.         Thought Content: Thought content normal.         Judgment: Judgment normal.         Fluids  No  intake or output data in the 24 hours ending 11/19/19 1718    Laboratory  Recent Labs     11/18/19 0207 11/19/19 0353   WBC 5.5 5.0   RBC 3.33* 3.42*   HEMOGLOBIN 9.9* 10.1*   HEMATOCRIT 32.2* 32.4*   MCV 96.7 94.7   MCH 29.7 29.5   MCHC 30.7* 31.2*   RDW 55.5* 54.4*   PLATELETCT 277 266   MPV 10.6 10.2     Recent Labs     11/18/19 0207 11/19/19 0353   SODIUM 141 141   POTASSIUM 3.6 3.4*   CHLORIDE 110 108   CO2 23 23   GLUCOSE 102* 140*   BUN 14 15   CREATININE 0.93 0.98   CALCIUM 8.8 8.8     Recent Labs     11/18/19 0207   APTT 38.3*   INR 2.08*               Imaging  US-EXTREMITY ARTERY UPPER UNILAT RIGHT   Final Result           Assessment/Plan  Aneurysm of right ulnar artery (HCC)- (present on admission)  Assessment & Plan  The patient developed a right ulnar pseudoaneurysm that was identified on ultrasound.  The patient at this point is scheduled to go to surgery tomorrow.  The patient's pseudoaneurysm is a result of cardiac catheterization.  N.p.o. as of midnight    Acute combined systolic and diastolic heart failure (HCC)- (present on admission)  Assessment & Plan  Currently patient's fluid status is well preserved.  The patient continues at this point on blood pressure management as well as diuresis.    AF (atrial fibrillation) (HCC)- (present on admission)  Assessment & Plan  Patient currently is on rate control with a combination of metoprolol  mg daily as well as digoxin 250 mcg daily.    Coronary artery disease involving native coronary artery of native heart without angina pectoris- (present on admission)  Assessment & Plan  Status post PCI continue at this point with medical optimization including metoprolol, Cozaar, Lipitor, aspirin.      Obesity- (present on admission)  Assessment & Plan  Body mass index is 34.72 kg/m².  Will need weight loss management optimization.      Type 2 diabetes mellitus with hyperglycemia, without long-term current use of insulin (HCC)- (present on  admission)  Assessment & Plan  -accus with sliding scale coverage  -diabetic diet  -diabetic education  -follow glycohemoglobin levels long term  -continue with oral antihyperglycemics  -monitor for hypoglycemic episodes and adjust control if he should get low         VTE prophylaxis: SCDs

## 2019-11-20 NOTE — PROGRESS NOTES
Rounding: Pt sleeping, calm, unlabored and even breathing. In no signs of pain or distress. Will continue to monitor.

## 2019-11-20 NOTE — PROGRESS NOTES
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 She has hx of Afib (on xarelto), CHF, CVA, HTN, and HLD.     She had a cardiac cath w/ two KAYCE placed in the LAD (on ASA/plavix) via R ulnar access on 10/24/2019 and states that she initially did fine. About 4 days later, she noticed swelling and pain at the access site. A duplex from OSH (neither images nor report available) suggested a PSA.     She currently has wrist swelling, but significant pain and numbness of the 4th and 5th fingers.     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 Ulnar not palpable. Hand is warm with good cap refill. Tenderness of the right wrist, ulnar aspect. 5/5      Wounds: None     General appearance: NAD, conversing appropriately  Psych: Normal affect, mood, judgement  Neuro: CN II-XII grossly intact.   Neck: full range of motion  Lungs: No inspiratory stridor or wheezing  CV: RRR  Abdomen: Soft, NT/ND  Skin: No rashes    Imaging Reviewed Today:  Findings   Right ulnar artery demonstrates to-for flow with high velocities.   Pseudoaneurysm of the distal ulnar artery extends from the anterior wrist    medially to  the posterior wrist with minimal thrombus. Doppler waveforms of    the neck demonstrate high velocity (>200cm/sec) to-for flow.   Pseudoaneurysm measures 4.32 cm x 1.8 cm sagittally and 2.3 cm x 2.03 cm    transverse, the neck measures 0.25 cm wide with no length.     Assessment/Plan & Medical Decision-Making  54F admitted w/ right wrist pain and finger numbness after cath via ulnar access almost 1mo ago.     Increased swelling and discomfort.    Pt is scheduled for PSA repair today at 1930.  Ok to have breakfast cliff prior to 0800 but keep NPO after that.  Cont ASA/plavix.     Rica Padilla PA-C for [facility]  Vascular Surgeon  [facility]  Office: [phone]

## 2019-11-20 NOTE — PROGRESS NOTES
"      Cardiology Interval Note  Note Author: Yuri Galvez M.D.     Name Judith Vallecillo 1965   Age/Sex 54 y.o. female   MRN [id number]         After 5PM or if no immediate response to page, please call for cross-coverage  Attending/Team: Dr. Lehman/Cardiology See Patient List for primary contact information  Call [phone] to page    1st Call - Dr. Galvez          Reason for interval visit  (Principal Problem)   Pseudoaneurysm at R. Ulnar    Brief History: Patient is a 54-year-old female with history of CAD S/P 2 stent placement on 10/24/2019, HFrEF less than 30%, chronic A. fib, left MCA infarct, type II DM, hyperlipidemia, hypertension transferred from [facility] regarding right ulnar artery pseudoaneurysm level seen on ultrasound.  Please refer to Dr. Calhoun's consultation note for full details.    Interval Problem Daily Status Update  (24 hours)     -Spikes in blood pressure. Highest overnight was 177/74 just prior to morning medication. 130/72 yesterday evening   -Reported 8/10 at L. Wrist. Will start her on PRN Low dose tramadol   -U/S of R. Ulnar showed \"Pseudoaneurysm measures 4.32 cm x 1.8 cm saggitally and 2.3 cm x 2.03 cm  transverse, the neck measures 0.25 cm wide with no length.\"  -Pending surgical intervention at R. Ulnar today. Planning for  per notes  -Please start Anticoagulation ASAP after the Surgical intervention       Review of Systems   Constitutional: Negative for chills and fever.   Eyes: Negative for blurred vision and double vision.   Respiratory: Negative for cough and hemoptysis.    Cardiovascular: Negative for chest pain, palpitations, orthopnea, claudication, leg swelling and PND.   Gastrointestinal: Negative for heartburn and nausea.   Genitourinary: Negative for dysuria and urgency.   Neurological: Positive for tingling and sensory change. Negative for dizziness and headaches.        Numbness, tingling and weakness at R. 4th and 5th fingers " going to R. Wrist   Psychiatric/Behavioral: Negative for depression and suicidal ideas.         Physical Exam       Vitals:    11/19/19 1706 11/19/19 1950 11/19/19 2349 11/20/19 0502   BP: 130/72 (!) 168/72 150/98 (!) 177/74   Pulse: 77 77 79 77   Resp: 19 16 17 16   Temp: 36.2 °C (97.2 °F) 36.6 °C (97.8 °F) 36.2 °C (97.2 °F) 36.2 °C (97.2 °F)   TempSrc: Temporal Temporal Temporal Temporal   SpO2: 94% 96% 96% 96%   Weight:       Height:         Body mass index is 34.72 kg/m².    Oxygen Therapy:  Pulse Oximetry: 96 %, O2 (LPM): 0, O2 Delivery: None (Room Air)    Physical Exam   Constitutional: She is oriented to person, place, and time. No distress.   HENT:   Head: Normocephalic and atraumatic.   Eyes: Pupils are equal, round, and reactive to light. EOM are normal.   Neck: Normal range of motion. Neck supple. No thyromegaly present.   Cardiovascular: Normal rate.   No murmur heard.  Irregularly Irregular Rhythm    Pulmonary/Chest: Effort normal and breath sounds normal. No respiratory distress.   Abdominal: Soft. Bowel sounds are normal. She exhibits no distension.   Musculoskeletal: Normal range of motion.         General: No deformity.      Comments: Mild edema without bruising or discoloration Medical side of R. Wrist    Neurological: She is alert and oriented to person, place, and time. No cranial nerve deficit.   Skin: Skin is warm and dry. She is not diaphoretic. No erythema.   Psychiatric: Mood, memory, affect and judgment normal.         Lab Data Review:   11/18/2019  10:13 AM    Recent Labs     11/18/19 0207 11/19/19  0353   SODIUM 141 141   POTASSIUM 3.6 3.4*   CHLORIDE 110 108   CO2 23 23   BUN 14 15   CREATININE 0.93 0.98   CALCIUM 8.8 8.8       Recent Labs     11/18/19 0207 11/19/19 0353   ALTSGPT 12  --    ASTSGOT 15  --    ALKPHOSPHAT 108*  --    TBILIRUBIN 0.9  --    GLUCOSE 102* 140*       Recent Labs     11/18/19 0207 11/19/19 0353   RBC 3.33* 3.42*   HEMOGLOBIN 9.9* 10.1*   HEMATOCRIT 32.2*  "32.4*   PLATELETCT 277 266   PROTHROMBTM 24.1*  --    APTT 38.3*  --    INR 2.08*  --        Recent Labs     11/18/19  0207 11/19/19  0353   WBC 5.5 5.0   NEUTSPOLYS 65.50 69.40   LYMPHOCYTES 20.20* 17.00*   MONOCYTES 10.50 10.60   EOSINOPHILS 2.50 2.20   BASOPHILS 1.30 0.60   ASTSGOT 15  --    ALTSGPT 12  --    ALKPHOSPHAT 108*  --    TBILIRUBIN 0.9  --            Assessment/Plan    #Pseudoaneurysm at R. Wrist  #Status post cath on 10/24/2019  #Chronic systolic heart failure, NYHA class III, stage C  #Chronic A. fib with left atrial thrombus per [facility] on 10/26/2019  #CAD s/p cardiac cath 2 stents placed 10/24/2019  #Ischemic CVA   #Hypertension  #Dyslipidemia  #Previous smoker stopped 1 month ago  #Hypokalemia     Patient is a 54-year-old female with significant cardiac history with coronary artery disease s/p 2 stents placed, HFrEF less than 30%, ischemic CVA presented with pseudoaneurysm of the right ulnar artery.  -U/S of R. Ulnar showed \"Pseudoaneurysm measures 4.32 cm x 1.8 cm saggitally and 2.3 cm x 2.03 cm  transverse, the neck measures 0.25 cm wide with no length.\"  -8/10 at L. Wrist    Recommendations:  -Amlodipine to 10, Losartan 100 mg, Metoprolol  mg  -Will start her PRN Tramadol   -Replacing Potassium  -Pending surgical intervention at R. Ulnar today   -Continues Plavix and ASA. Hold Xarelto   -Please start Anticoagulation ASAP after the Surgical intervention        "

## 2019-11-20 NOTE — PROGRESS NOTES
Bedside report received at 1900. Assessment done. VSS. Telemonitored, afib. AOx4, tingling in R hand. Unlabored and even breathing,RA. 5/10  R arm pain at this time, given ice pack. Skin intact, slight swelling R wrist. 100% dinner finished. Hourly patient rounding done. Fall precautions in place.  Call light in place, bed locked and in lowest position. White board updated. Reviewed POC(surgery tomorrow). All questions answered at this time.

## 2019-11-21 ENCOUNTER — PATIENT OUTREACH (OUTPATIENT)
Dept: HEALTH INFORMATION MANAGEMENT | Facility: OTHER | Age: 54
End: 2019-11-21

## 2019-11-21 VITALS
HEIGHT: 62 IN | HEART RATE: 111 BPM | RESPIRATION RATE: 16 BRPM | WEIGHT: 181.44 LBS | BODY MASS INDEX: 33.39 KG/M2 | OXYGEN SATURATION: 95 % | SYSTOLIC BLOOD PRESSURE: 165 MMHG | DIASTOLIC BLOOD PRESSURE: 84 MMHG | TEMPERATURE: 98.3 F

## 2019-11-21 PROBLEM — I50.41 ACUTE COMBINED SYSTOLIC AND DIASTOLIC HEART FAILURE (HCC): Status: RESOLVED | Noted: 2019-10-20 | Resolved: 2019-11-21

## 2019-11-21 LAB
GLUCOSE BLD-MCNC: 116 MG/DL (ref 65–99)
GLUCOSE BLD-MCNC: 145 MG/DL (ref 65–99)
GLUCOSE BLD-MCNC: 94 MG/DL (ref 65–99)
PATHOLOGY CONSULT NOTE: NORMAL

## 2019-11-21 PROCEDURE — 700102 HCHG RX REV CODE 250 W/ 637 OVERRIDE(OP): Performed by: INTERNAL MEDICINE

## 2019-11-21 PROCEDURE — A9270 NON-COVERED ITEM OR SERVICE: HCPCS | Performed by: INTERNAL MEDICINE

## 2019-11-21 PROCEDURE — 700102 HCHG RX REV CODE 250 W/ 637 OVERRIDE(OP): Performed by: STUDENT IN AN ORGANIZED HEALTH CARE EDUCATION/TRAINING PROGRAM

## 2019-11-21 PROCEDURE — G0378 HOSPITAL OBSERVATION PER HR: HCPCS

## 2019-11-21 PROCEDURE — A9270 NON-COVERED ITEM OR SERVICE: HCPCS | Performed by: STUDENT IN AN ORGANIZED HEALTH CARE EDUCATION/TRAINING PROGRAM

## 2019-11-21 PROCEDURE — 99217 PR OBSERVATION CARE DISCHARGE: CPT | Performed by: INTERNAL MEDICINE

## 2019-11-21 PROCEDURE — A9270 NON-COVERED ITEM OR SERVICE: HCPCS | Performed by: NURSE PRACTITIONER

## 2019-11-21 PROCEDURE — 700102 HCHG RX REV CODE 250 W/ 637 OVERRIDE(OP): Performed by: NURSE PRACTITIONER

## 2019-11-21 PROCEDURE — 82962 GLUCOSE BLOOD TEST: CPT

## 2019-11-21 RX ORDER — TRAMADOL HYDROCHLORIDE 50 MG/1
25 TABLET ORAL EVERY 6 HOURS PRN
Qty: 12 TAB | Refills: 0 | Status: SHIPPED | OUTPATIENT
Start: 2019-11-21 | End: 2019-11-24

## 2019-11-21 RX ORDER — AMLODIPINE BESYLATE 10 MG/1
10 TABLET ORAL DAILY
Qty: 30 TAB | Refills: 1 | Status: SHIPPED | OUTPATIENT
Start: 2019-11-22

## 2019-11-21 RX ORDER — TRAMADOL HYDROCHLORIDE 50 MG/1
25 TABLET ORAL EVERY 6 HOURS PRN
Qty: 12 TAB | Refills: 0 | Status: SHIPPED | OUTPATIENT
Start: 2019-11-21 | End: 2019-11-21

## 2019-11-21 RX ORDER — LOSARTAN POTASSIUM 100 MG/1
100 TABLET ORAL DAILY
Qty: 30 TAB | Refills: 2 | Status: SHIPPED | OUTPATIENT
Start: 2019-11-21

## 2019-11-21 RX ORDER — HYDROCHLOROTHIAZIDE 12.5 MG/1
12.5 TABLET ORAL DAILY
Qty: 30 TAB | Refills: 1 | Status: SHIPPED | OUTPATIENT
Start: 2019-11-21

## 2019-11-21 RX ORDER — ASPIRIN 81 MG/1
81 TABLET ORAL DAILY
Qty: 30 TAB | Refills: 1 | Status: SHIPPED | OUTPATIENT
Start: 2019-11-22

## 2019-11-21 RX ORDER — HYDROCHLOROTHIAZIDE 25 MG/1
12.5 TABLET ORAL
Status: DISCONTINUED | OUTPATIENT
Start: 2019-11-21 | End: 2019-11-21 | Stop reason: HOSPADM

## 2019-11-21 RX ADMIN — AMLODIPINE BESYLATE 10 MG: 10 TABLET ORAL at 05:45

## 2019-11-21 RX ADMIN — LOSARTAN POTASSIUM 100 MG: 50 TABLET, FILM COATED ORAL at 05:44

## 2019-11-21 RX ADMIN — SPIRONOLACTONE 25 MG: 50 TABLET ORAL at 05:45

## 2019-11-21 RX ADMIN — HYDROCHLOROTHIAZIDE 12.5 MG: 25 TABLET ORAL at 12:44

## 2019-11-21 RX ADMIN — TRAMADOL HYDROCHLORIDE 25 MG: 50 TABLET, FILM COATED ORAL at 13:36

## 2019-11-21 RX ADMIN — RIVAROXABAN 20 MG: 20 TABLET, FILM COATED ORAL at 12:44

## 2019-11-21 RX ADMIN — LABETALOL HYDROCHLORIDE 10 MG: 5 INJECTION INTRAVENOUS at 14:28

## 2019-11-21 RX ADMIN — ASPIRIN 81 MG: 81 TABLET, COATED ORAL at 05:45

## 2019-11-21 RX ADMIN — CLOPIDOGREL BISULFATE 75 MG: 75 TABLET ORAL at 05:44

## 2019-11-21 ASSESSMENT — COGNITIVE AND FUNCTIONAL STATUS - GENERAL
MOVING FROM LYING ON BACK TO SITTING ON SIDE OF FLAT BED: A LITTLE
TURNING FROM BACK TO SIDE WHILE IN FLAT BAD: A LITTLE
SUGGESTED CMS G CODE MODIFIER MOBILITY: CJ
DRESSING REGULAR LOWER BODY CLOTHING: A LOT
MOVING TO AND FROM BED TO CHAIR: A LITTLE
TOILETING: A LITTLE
PERSONAL GROOMING: A LITTLE
SUGGESTED CMS G CODE MODIFIER DAILY ACTIVITY: CK
DAILY ACTIVITIY SCORE: 16
DRESSING REGULAR UPPER BODY CLOTHING: TOTAL
STANDING UP FROM CHAIR USING ARMS: A LITTLE
MOBILITY SCORE: 20
HELP NEEDED FOR BATHING: A LITTLE

## 2019-11-21 ASSESSMENT — CHA2DS2 SCORE
AGE 75 OR GREATER: NO
VASCULAR DISEASE: YES
SEX: FEMALE
CHA2DS2 VASC SCORE: 7
CHF OR LEFT VENTRICULAR DYSFUNCTION: YES
AGE 65 TO 74: NO
DIABETES: YES
HYPERTENSION: YES
PRIOR STROKE OR TIA OR THROMBOEMBOLISM: YES

## 2019-11-21 NOTE — CARE PLAN
Fall precautions in place. Bed in lowest position. Non-skid socks in place. Personal possessions within reach. Mobility sign on door. Call light within reach. Pt educated regarding fall prevention and states understanding.     Pt educated regarding plan of care and medications. All questions answered.

## 2019-11-21 NOTE — PROGRESS NOTES
Patient up from ED, report received. Patient placed on tele monitor, monitor room notified. Pt afib 72. Physical assessment performed. Patient is A&O X 4, pain level 0/10 .  Patient oriented to room and unit routine. Patient educated on plan for the day. Pt up SB assist and educated on use of call light.

## 2019-11-21 NOTE — PROGRESS NOTES
Pt w/ multiple 2.3-2.6 sp overnight, bradying down as low as 31. Pt sleeping calmly in bed, VSS. Dr Levy paged. Orders to hold morning dose of metop.

## 2019-11-21 NOTE — PROGRESS NOTES
Assumed care at 0700, bedside report received from NOC RN. Pt. Is SR on the monitor. Initial assessment completed, orders reviewed, call light within reach, bed alarm is not in use, and hourly rounding in place. POC addressed with patient, no additional questions at this time.

## 2019-11-21 NOTE — CARE PLAN
Problem: Safety  Goal: Will remain free from injury  Outcome: PROGRESSING AS EXPECTED   Treaded socks in use  Call light within reach and patient calls appropriately  Medication education provided prior to administration  Medications administered as ordered  Bed locked in lowest position    Problem: Communication  Goal: The ability to communicate needs accurately and effectively will improve  Outcome: PROGRESSING AS EXPECTED   Pt whiteboard updated on plan of care  Pt encouraged to call for assistance  Pt encouraged to voice any concerns or questions regarding care plan  Pt updated on plan of care as it develops and changes

## 2019-11-21 NOTE — OR NURSING
2143 Pt arrived from OR per anesthesia report, pt's right FA and fingers blocked for procedure.  Pt awake and alert, even and unlabored breaths.  Pt denies pain and is able to move arm off chest, but cannot wiggle fingers.  Pt presents with dusky fingers exposed from ace wrap covering incision and FA.  Per anesthesia and pt report fingers look same dusky color as they were before surgery.  Pt has <3sec cap refill and fingers are warm.  Pt with no sensation to fingers or FA due to nerve block completed by anesthesia for surgical procedure.

## 2019-11-21 NOTE — DISCHARGE SUMMARY
Discharge Summary    CHIEF COMPLAINT ON ADMISSION  No chief complaint on file.      Reason for Admission  Right Ulnar Aneurysm     Admission Date  11/18/2019    CODE STATUS  Full Code    HPI & HOSPITAL COURSE  This is a 53 y/o female with history of A-fib, heart failure, diabetes, HTN, HLD, and CVA, who had a recent cardiac catheterization on 10/24, after which had worsening swelling of her right wrist since the procedure, presented with numbness and weakness of the right hand involving the fourth and fifth fingers and having trouble with flexion of the fourth and fifth digits.  She was later found to have 2.4 cm by 1.8 cm pseudoaneurysm of right ulnar artery on ultrasound.  Vascular surgery was consulted.  Anticoagulation was held.   Patient then underwent repair of the right ulnar artery pseudoaneurysm with uncompleted perioperative and postoperative course.  She did have uncontrolled blood pressure during her hospitalization, and her antihypertensives were adjusted, with better blood pressure control with Norvasc, hydrochlorothiazide, beta-blocker, and ARB.  Her pain was controlled with tramadol.    She was subsequently cleared for discharge by vascular surgery, and was cleared to resume her anticoagulant.  She remained hemodynamically stable and afebrile. I have personally seen and examined the patient on the day of discharge. With her clinical improvement, she was deemed ready to discharge from the hospital as she did not have any further hospitalization needs. Patient felt comfortable going home. The discharge plan was discussed with the patient, with which she was agreeable to.     Therefore, she is discharged in good and stable condition to home with close outpatient follow-up.        Discharge Date  11/21/2019      FOLLOW UP ITEMS POST DISCHARGE  -She will be resumed on triple therapy with aspirin, Plavix, and Xarelto.  She will follow-up with her cardiologist.  -She will be continued on Norvasc,  hydrochlorothiazide, losartan, and metoprolol.  Follow-up with PCP for further optimization of blood pressure control.  -Follow-up with vascular surgery.  - counseled to seek immediate medical attention, or return to the ED for recurrent or worsening symptoms.      DISCHARGE DIAGNOSES  Active Problems:    Aneurysm of right ulnar artery (HCC) POA: Yes    AF (atrial fibrillation) (HCC) POA: Yes    Obesity POA: Yes    Coronary artery disease involving native coronary artery of native heart without angina pectoris POA: Yes    Type 2 diabetes mellitus with hyperglycemia, without long-term current use of insulin (HCC) POA: Yes  Resolved Problems:    Acute combined systolic and diastolic heart failure (HCC) POA: Yes      FOLLOW UP  Future Appointments   Date Time Provider Department Center   12/11/2019  1:20 PM ROSEANN [facility] None     No follow-up provider specified.    MEDICATIONS ON DISCHARGE     Medication List      START taking these medications      Instructions   amLODIPine 10 MG Tabs  Start taking on:  November 22, 2019  Commonly known as:  NORVASC   Take 1 Tab by mouth every day.  Dose:  10 mg     aspirin 81 MG EC tablet  Start taking on:  November 22, 2019   Take 1 Tab by mouth every day.  Dose:  81 mg     hydroCHLOROthiazide 12.5 MG tablet  Commonly known as:  HYDRODIURIL   Take 1 Tab by mouth every day.  Dose:  12.5 mg     tramadol 50 MG Tabs  Commonly known as:  ULTRAM   Take 0.5 Tabs by mouth every 6 hours as needed for up to 3 days.  Dose:  25 mg        CHANGE how you take these medications      Instructions   losartan 100 MG Tabs  What changed:    · medication strength  · how much to take  Commonly known as:  COZAAR   Take 1 Tab by mouth every day.  Dose:  100 mg        CONTINUE taking these medications      Instructions   atorvastatin 40 MG Tabs  Commonly known as:  LIPITOR   Take 1 Tab by mouth every evening.  Dose:  40 mg     clopidogrel 75 MG Tabs  Commonly known as:  PLAVIX   Take 1 Tab by  mouth every day.  Dose:  75 mg     digoxin 250 MCG Tabs  Commonly known as:  LANOXIN   Take 1 Tab by mouth every day at 6 PM.  Dose:  250 mcg     glipiZIDE 5 MG Tabs  Commonly known as:  GLUCOTROL   Take 0.5 Tabs by mouth 2 times a day.  Dose:  2.5 mg     metoprolol 200 MG XL tablet  Commonly known as:  TOPROL-XL   Take 1 Tab by mouth every day.  Dose:  200 mg     rivaroxaban 20 MG Tabs tablet  Commonly known as:  XARELTO   Take 1 Tab by mouth with dinner.  Dose:  20 mg     spironolactone 25 MG Tabs  Commonly known as:  ALDACTONE   Take 1 Tab by mouth every day.  Dose:  25 mg            Allergies  Allergies   Allergen Reactions   • Bee Anaphylaxis     Swelling and airway closure.   • Latex Itching and Swelling     Swelling, redness, itching.   • Strawberry Itching     itching       DIET  Orders Placed This Encounter   Procedures   • Diet Order Cardiac     Standing Status:   Standing     Number of Occurrences:   1     Order Specific Question:   Diet:     Answer:   Cardiac [6]       ACTIVITY  As tolerated.  Weight bearing as tolerated    CONSULTATIONS  Vascular surgery  Cardiology    PROCEDURES  As above    LABORATORY  Lab Results   Component Value Date    SODIUM 143 11/20/2019    POTASSIUM 4.0 11/20/2019    CHLORIDE 110 11/20/2019    CO2 25 11/20/2019    GLUCOSE 132 (H) 11/20/2019    BUN 10 11/20/2019    CREATININE 0.98 11/20/2019        Lab Results   Component Value Date    WBC 4.6 (L) 11/20/2019    HEMOGLOBIN 10.3 (L) 11/20/2019    HEMATOCRIT 33.8 (L) 11/20/2019    PLATELETCT 270 11/20/2019        Total time of the discharge process = 40 minutes.

## 2019-11-21 NOTE — ANESTHESIA PROCEDURE NOTES
Peripheral Block  Date/Time: 11/20/2019 7:15 PM  Performed by: Sanjana Vasquez M.D.  Authorized by: Sanjana Vasquez M.D.     Patient Location:  Pre-op  Start Time:  11/20/2019 7:15 PM  End Time:  11/20/2019 7:30 PM  Reason for Block: at surgeon's request and post-op pain management    patient identified, IV checked, site marked, risks and benefits discussed, surgical consent, monitors and equipment checked, pre-op evaluation and timeout performed    Patient Position:  Supine  Prep: ChloraPrep    Prep comment:  X2  Monitoring:  Heart rate, cardiac monitor and continuous pulse ox  Block Region:  Upper Extremity  Upper Extremity - Block Type:  AXILLARY nerve block and other  Other Block Type: Intercostal bracheal  Laterality:  Right  Procedures: ultrasound guided  Image captured, interpreted and electronically stored.    Block Type:  Single-shot  Needle Length:  100mm  Needle Gauge:  21 G  Needle Localization:  Ultrasound guidance  Injection Assessment:  Negative aspiration for heme, incremental injection, local visualized surrounding nerve on ultrasound and paresthesia-transient/resolved  Evidence of intravascular injection: No     US Guided Axillary Block   US probe placed in axilla at intersection of pec major and biceps muscles.  Axillary Artery (AA) identified with Median (superficial), Radial (deep) and Ulnar (caudad) nerves surrounding artery.  Needle inserted cephalad to probe in an in plane approach to a perivascular/perineural position.  After negative aspiration LA injected with ease and visualized surrounding the artery and nerves.  Probe moved cephalad to identify corcobrachialis muscle and Musculocutaneous Nerve (MCN) within muscle belly. Needle inserted cephalad to probe in an in plane approach to a perineural position.  After negative aspiration LA injected with ease and visualized surrounding MCN.    Following axillary block a subcutaneous wheal was made with ropivacaine on the medial upper arm to  cover intercostalbracheal nerves

## 2019-11-21 NOTE — ANESTHESIA PREPROCEDURE EVALUATION
54F w/significant cardiac comorbidity, s/p LHC via radial artery with radial artery pseudoaneurysm, coming to OR for radial artery repair.    EF 20%    Allergies   Allergen Reactions   • Bee Anaphylaxis     Swelling and airway closure.   • Latex Itching and Swelling     Swelling, redness, itching.   • Strawberry Itching     itching      Relevant Problems   CARDIAC   (+) AF (atrial fibrillation) ([facility])   (+) Aneurysm of right ulnar artery ([facility])   (+) Bilateral carotid artery stenosis   (+) Coronary artery disease involving native coronary artery of native heart without angina pectoris         (+) JORGE (acute kidney injury) ([facility])      ENDO   (+) Type 2 diabetes mellitus with hyperglycemia, without long-term current use of insulin ([facility])      Other   (+) Acute combined systolic and diastolic heart failure ([facility])   (+) Broca's aphasia   (+) Cerebral infarction ([facility]) (many years ago, symptoms fully resolved)   (+) Obesity   (+) Smoker (quit 4 weeks ago)     Past Medical History:   Diagnosis Date   • Atrial fibrillation ([facility])    • CAD (coronary artery disease)    • CHF (congestive heart failure) ([facility])    • Diabetes ([facility])    • Hyperlipidemia    • Hypertension    • Ischemic cardiomyopathy    • Pneumonia 1975    as a child   • Stroke ([facility]) 2011    right-sided weakness at that time-resolved      No current facility-administered medications on file prior to encounter.      Current Outpatient Medications on File Prior to Encounter   Medication Sig Dispense Refill   • digoxin (LANOXIN) 250 MCG Tab Take 1 Tab by mouth every day at 6 PM. 30 Tab 11   • losartan (COZAAR) 50 MG Tab Take 1 Tab by mouth every day. 30 Tab 11   • spironolactone (ALDACTONE) 25 MG Tab Take 1 Tab by mouth every day. 30 Tab 11   • clopidogrel (PLAVIX) 75 MG Tab Take 1 Tab by mouth every day. 30 Tab 11   • rivaroxaban (XARELTO) 20 MG Tab tablet Take 1 Tab by mouth with dinner. 30 Tab 0   • glipiZIDE (GLUCOTROL) 5 MG Tab Take 0.5 Tabs by mouth 2 times a day. 30  Tab 0   • atorvastatin (LIPITOR) 40 MG Tab Take 1 Tab by mouth every evening. 90 Tab 3   • metoprolol (TOPROL-XL) 200 MG XL tablet Take 1 Tab by mouth every day. 30 Tab 0      Past Surgical History:   Procedure Laterality Date   • ZZZ CARDIAC CATH  10/24/2019    KAYCE x2 to mid and distal LAD   • RECOVERY  6/2/2011    Performed by SURGERY, RECOVERYONLY at [facility] ORS      Vitals:    11/20/19 1536   BP: 141/77   Pulse: (!) 110   Resp: 17   Temp: 37.1 °C (98.8 °F)   SpO2: 97%      Physical Exam    Airway   Mallampati: II  TM distance: >3 FB  Neck ROM: limited       Cardiovascular   Rhythm: irregular  Rate: abnormal     Dental - normal exam         Pulmonary   Breath sounds clear to auscultation  (-) wheezes     Abdominal   (+) obese     Neurological - normal exam                 Anesthesia Plan    ASA 4   ASA physical status 4 criteria: severe reduction of ejection fractions    Plan - peripheral nerve block     Peripheral nerve block will be primary anesthetic        Induction: intravenous      Pertinent diagnostic labs and testing reviewed    Informed Consent:    Anesthetic plan and risks discussed with patient.    Use of blood products discussed with: patient whom consented to blood products.

## 2019-11-21 NOTE — PROGRESS NOTES
2 RN Skin Check    2 RN skin check complete.   Devices in place: SCDs and Nasal Cannula.  Skin assessed under devices: yes.  Confirmed pressure ulcers found on: n/a.  New potential pressure ulcers noted on n/a. Wound consult placed N/A.  The following interventions in place Pillows, Lotion and silicone oxygen tubing.    BL ears pink, blanching.  Sacrum pink and blanching.  Dressing in place over RFA, CDI. Cap refill on R hand <3 seconds.   BL feet dry, cracked.

## 2019-11-21 NOTE — OP REPORT
OPERATIVE REPORT      Patient:  Judith Vallecillo     Procedure Date:  11/20/19    Indication:  Ulnar artery pseudoaneurysm with nerve compression    Pre-Operative Diagnosis:  4.3 x 2.0cm right ulnar artery pseudoaneurysm w/ compressive symptoms    Post-Operative Diagnosis:  Same    Procedure:  Repair of right ulnar artery pseudoaneurysm    Surgeon:  Ishaan Lechuga M.D.    Assistant:  None    Anesthesia:  Regional block, local    EBL:  30cc (PSA)    Specimen:  PSA contents    Complications:  None    Findings:  There was a thrombosed PSA off the right ulnar artery at the wrist. Approximately 30cc of clot was extracted. There was no active bleeding from the artery, however the neck of the PSA was identified and traced back to the artery and the arteriotomy was oversewn with 6-0 prolene.     Procedure:  Informed consent was obtained from the patient in the pre-operative holding area. All risks, benefits, and alternatives were explained and she elected to proceed. Anesthesia administered a regional block and the patient was brought to the operating room and placed on the table in a supine position. Adela-operative antibiotics were given, and a time-out was performed verifying the correct site of surgery. The patient was prepped and draped in the usual sterile fashion.    A 3cm longitudinal incision was made over the pseudoaneurym. Sharp dissection was carried out down to fascia. The antebrachial fascia was incised the capsule was identified and had no pulse. The contents of the pseudoaneurysm were extracted. There was a significant amount of dead space tracking medially in the forearm which was copiously irrigated.     There was no arterial bleeding. The neck of the PSA was identified and dissected back to the arteriotomy which was oversewn with 6-0 prolene. The artery was interrogated with doppler and there was strong signal proximal and distal to the repair site.    Hemostasis was obtained  in the wound. The incision was closed in layers with vicryl. Skin was closed with monocryl. An occlusive dressing was applied and the forearm was wrapped with an ACE to help close off dead space. The patient was recovered and transferred to PACU in stable condition.    Ishaan Lechuga M.D.  Vascular Surgeon  [facility]

## 2019-11-21 NOTE — ANESTHESIA POSTPROCEDURE EVALUATION
After Visit Summary   8/6/2018    Denise Melendez    MRN: [id number]           Patient Information     Date Of Birth          2009        Visit Information        Provider Department      8/6/2018 2:00 PM Machelle Gan MD [facility]        Today's Diagnoses     Inflammatory arthritis    -  1      Care Instructions    How to contact us:    My chart: Sign up for my chart! Use it to contact your doctors or nurses but not for urgent issues.    [facility] Nurse Coordinators: [phone]   Dolores Galvez, Abida Tomlinson, or Sasha Szymanski can help with questions about your child's rheumatic condition, medications, and test results.    After Hours/Paging : [phone]  For urgent issues after hours or on the weekends, please call the page  ask to speak to the physician on-call for Pediatric Rheumatology. Please do not use [employer] for urgent requests.    Infusions in [place] at [facility]: [phone] - Please try to schedule infusions at least 2 months in advance. Please try to give us 72 hours or longer notice if you need to cancel infusions so other patients can benefit from this opening.     Note: Insurance authorization must be obtained before any infusion can be  scheduled. If you change health insurance, you must notify our office as soon as  possible, so that the infusion can be reauthorized.            Follow-ups after your visit        Follow-up notes from your care team     Return in about 6 weeks (around 9/17/2018).      Your next 10 appointments already scheduled     Sep 17, 2018 11:30 AM CDT   Return Visit with Machelle Gan MD   [facility] ([facility])    [address] [facility] [address]  [facility] [id number] [phone]              Who to contact     If you have questions or need follow up information        Patient: Judith Vallecillo    Procedure Summary     Date:  11/20/19 Room / Location:  [place] [address] / SURGERY [facility]    Anesthesia Start:  1935 Anesthesia Stop:  2046    Procedure:  CREATION, AV FISTULA-ULNAR ARTERY PSEUDOANEURYSM REPAIR (Right Arm Lower) Diagnosis:  (Ulnar Artery Pseudoaneurysm)    Surgeon:  Ishaan Lechuga M.D. Responsible Provider:      Anesthesia Type:  peripheral nerve block ASA Status:  4          Final Anesthesia Type: peripheral nerve block  Last vitals  BP   Blood Pressure: 131/79    Temp   36.4 °C (97.6 °F)    Pulse   Pulse: 75   Resp   17    SpO2   98 %      Anesthesia Post Evaluation    Patient location during evaluation: PACU  Patient participation: complete - patient participated  Level of consciousness: awake and alert  Pain score: 0    Airway patency: patent  Anesthetic complications: no  Cardiovascular status: hemodynamically stable  Respiratory status: acceptable  Hydration status: euvolemic    PONV: none           Nurse Pain Score: 0 (NPRS)         "about today's clinic visit or your schedule please contact [facility] directly at [phone].  Normal or non-critical lab and imaging results will be communicated to you by [employer], letter or phone within 4 business days after the clinic has received the results. If you do not hear from us within 7 days, please contact the clinic through [employer] or phone. If you have a critical or abnormal lab result, we will notify you by phone as soon as possible.  Submit refill requests through [employer] or call your pharmacy and they will forward the refill request to us. Please allow 3 business days for your refill to be completed.          Additional Information About Your Visit        [employer] Information     Paytrail lets you send messages to your doctor, view your test results, renew your prescriptions, schedule appointments and more. To sign up, go to [email or web address], contact your [place] clinic or call [phone] during business hours.            Care EveryWhere ID     This is your Care EveryWhere ID. This could be used by other organizations to access your [place] medical records  [id number]        Your Vitals Were     Pulse Height BMI (Body Mass Index)             99 4' 9.8\" (146.8 cm) 15.78 kg/m2          Blood Pressure from Last 3 Encounters:   08/06/18 114/78   07/09/18 100/70   01/10/18 115/70    Weight from Last 3 Encounters:   08/06/18 74 lb 15.3 oz (34 kg) (27 %)*   07/09/18 73 lb 10.1 oz (33.4 kg) (25 %)*   01/10/18 66 lb 9.3 oz (30.2 kg) (18 %)*     * Growth percentiles are based on CDC 2-20 Years data.                 Today's Medication Changes          These changes are accurate as of 8/6/18 11:59 PM.  If you have any questions, ask your nurse or doctor.               Start taking these medicines.        Dose/Directions    naproxen 250 MG tablet   Commonly known as:  NAPROSYN   Used for:  Inflammatory arthritis        Dose:  250 mg   Take 1 tablet (250 mg) by " mouth 2 times daily (with meals)   Quantity:  60 tablet   Refills:  1            Where to get your medicines      These medications were sent to [facility], [place] - [place], [place] - [address]  [address] [facility] [address]     Phone:  [phone]     naproxen 250 MG tablet                Primary Care Provider Office Phone # Fax #    Rach Zaida Collazo [phone]       [facility] [address]  [facility] [address]        Equal Access to Services     [facility]: Hadii aad ku hadasho Soomaali, waaxda luqadaha, qaybta kaalmada adeegyada, waxay shakeel haybenin bg ro . So [facility] [phone].    ATENCIÓN: Si habla español, tiene a galloway disposición servicios gratuitos de asistencia lingüística. [facility] [phone].    We comply with applicable federal civil rights laws and [place] laws. We do not discriminate on the basis of race, color, national origin, age, disability, sex, sexual orientation, or gender identity.            Thank you!     Thank you for choosing [facility]  for your care. Our goal is always to provide you with excellent care. Hearing back from our patients is one way we can continue to improve our services. Please take a few minutes to complete the written survey that you may receive in the mail after your visit with us. Thank you!             Your Updated Medication List - Protect others around you: Learn how to safely use, store and throw away your medicines at www.disposemymeds.org.          This list is accurate as of 8/6/18 11:59 PM.  Always use your most recent med list.                   Brand Name Dispense Instructions for use Diagnosis    folic acid 1 MG tablet    FOLVITE    90 tablet    Take 1 tablet (1 mg) by mouth daily    Juvenile dermatomyositis (H), Juvenile arthritis (H)       hydroxychloroquine 200 MG tablet    PLAQUENIL    60 tablet    Take 1 tablet, 5 times weekly as directed     "Juvenile arthritis (H), Juvenile dermatomyositis (H)       IBUPROFEN PO      Take by mouth as needed for moderate pain        insulin syringe 31G X 5/16\" 1 ML Misc     100 each    Please use for Methotrexate injections as directed    Juvenile arthritis (H), Juvenile dermatomyositis (H)       lidocaine-prilocaine cream    EMLA    30 g    Apply topically as needed for moderate pain    Methotrexate, long term, current use       methotrexate 50 MG/2ML injection CHEMO     12 vial    Inject 0.8 mLs (20 mg) Subcutaneous once a week Please provide medication with preservative NOT  Preservative free. Denise is due for labs.    Juvenile arthritis (H), Juvenile dermatomyositis (H)       naproxen 250 MG tablet    NAPROSYN    60 tablet    Take 1 tablet (250 mg) by mouth 2 times daily (with meals)    Inflammatory arthritis         "

## 2019-11-21 NOTE — PROGRESS NOTES
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 She has hx of Afib (on xarelto), CHF, CVA, HTN, and HLD.     She had a cardiac cath w/ two KAYCE placed in the LAD (on ASA/plavix) via R ulnar access on 10/24/2019 and states that she initially did fine. About 4 days later, she noticed swelling and pain at the access site. A duplex from OSH (neither images nor report available) suggested a PSA.     She currently has wrist swelling, but significant pain and numbness of the 4th and 5th fingers.     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 but no motor deficit just limited due to swelling    POD #1 Repair of right ulnar artery pseudoaneurysm    Last night some bradycardia primary team held betablocker and monitoring    Pain in her left arm this AM     Medications  amLODIPine (NORVASC) tablet 10 mg   aspirin EC (ECOTRIN) tablet 81 mg   atorvastatin (LIPITOR) tablet 40 mg   clopidogrel (PLAVIX) tablet 75 mg   insulin regular (HUMULIN R) injection 1-6 Units   digoxin (LANOXIN) tablet 250 mcg   losartan (COZAAR) tablet 100 mg   metoprolol SR (TOPROL XL) tablet 200 mg   potassium chloride SA (Kdur) tablet 20 mEq   spironolactone (ALDACTONE) tablet 25 mg     Physical Exam  AVSS  Hypertensive 160's    Pulse/Extremity Exam:   Hand warm with good capillary refill.    Wounds: None     General appearance: NAD, conversing appropriately  Psych: Normal affect, mood, judgement  Neuro: CN II-XII grossly intact.   Neck: full range of motion  Lungs: No inspiratory stridor or wheezing  CV: RRR  Abdomen: Soft, NT/ND  Skin: Surgical dressing in place clean and dry    Imaging Reviewed Today:  none    Assessment/Plan & Medical Decision-Making  54F admitted w/  right wrist pain and finger numbness after cath via ulnar access almost 1mo ago.     POD #1 Repair of right ulnar artery pseudoaneurysm. ACE to help close off dead space.    Ok to discharge when medically stable with instructions to keep surgical dressing in place and dry for 5 days then remove, wash daily, pat dry, dry gauze dressing daily.  Ace wrap to arm at all times until seen in clinic. Follow up with [facility] in 2 weeks for wound check.    Rica Padilla PA-C for [facility]  Vascular Surgeon  [facility]  Office: [phone]

## 2019-11-21 NOTE — DISCHARGE INSTRUCTIONS
Discharge Instructions    Please make a follow up appointment with [facility] at [phone] within one week.      Discharged to home by car with friend. Discharged via wheelchair, hospital escort: Yes.  Special equipment needed: Not Applicable    Be sure to schedule a follow-up appointment with your primary care doctor or any specialists as instructed.     Discharge Plan:   Diet Plan: Discussed  Activity Level: Discussed  Smoking Cessation Offered: Patient Counseled  Confirmed Follow up Appointment: Patient to Call and Schedule Appointment  Confirmed Symptoms Management: Discussed  Medication Reconciliation Updated: Yes  Influenza Vaccine Indication: Not indicated: Previously immunized this influenza season and > 8 years of age    I understand that a diet low in cholesterol, fat, and sodium is recommended for good health. Unless I have been given specific instructions below for another diet, I accept this instruction as my diet prescription.   Other diet: Cardiac    Special Instructions: None    · Is patient discharged on Warfarin / Coumadin?   No       Hypertension  Hypertension, commonly called high blood pressure, is when the force of blood pumping through your arteries is too strong. Your arteries are the blood vessels that carry blood from your heart throughout your body. A blood pressure reading consists of a higher number over a lower number, such as 110/72. The higher number (systolic) is the pressure inside your arteries when your heart pumps. The lower number (diastolic) is the pressure inside your arteries when your heart relaxes. Ideally you want your blood pressure below 120/80.  Hypertension forces your heart to work harder to pump blood. Your arteries may become narrow or stiff. Having untreated or uncontrolled hypertension can cause heart attack, stroke, kidney disease, and other problems.  What increases the risk?  Some risk factors for high blood pressure are controllable.  Others are not.  Risk factors you cannot control include:  · Race. You may be at higher risk if you are .  · Age. Risk increases with age.  · Gender. Men are at higher risk than women before age 45 years. After age 65, women are at higher risk than men.  Risk factors you can control include:  · Not getting enough exercise or physical activity.  · Being overweight.  · Getting too much fat, sugar, calories, or salt in your diet.  · Drinking too much alcohol.  What are the signs or symptoms?  Hypertension does not usually cause signs or symptoms. Extremely high blood pressure (hypertensive crisis) may cause headache, anxiety, shortness of breath, and nosebleed.  How is this diagnosed?  To check if you have hypertension, your health care provider will measure your blood pressure while you are seated, with your arm held at the level of your heart. It should be measured at least twice using the same arm. Certain conditions can cause a difference in blood pressure between your right and left arms. A blood pressure reading that is higher than normal on one occasion does not mean that you need treatment. If it is not clear whether you have high blood pressure, you may be asked to return on a different day to have your blood pressure checked again. Or, you may be asked to monitor your blood pressure at home for 1 or more weeks.  How is this treated?  Treating high blood pressure includes making lifestyle changes and possibly taking medicine. Living a healthy lifestyle can help lower high blood pressure. You may need to change some of your habits.  Lifestyle changes may include:  · Following the DASH diet. This diet is high in fruits, vegetables, and whole grains. It is low in salt, red meat, and added sugars.  · Keep your sodium intake below 2,300 mg per day.  · Getting at least 30-45 minutes of aerobic exercise at least 4 times per week.  · Losing weight if necessary.  · Not smoking.  · Limiting alcoholic  beverages.  · Learning ways to reduce stress.  Your health care provider may prescribe medicine if lifestyle changes are not enough to get your blood pressure under control, and if one of the following is true:  · You are 18-59 years of age and your systolic blood pressure is above 140.  · You are 60 years of age or older, and your systolic blood pressure is above 150.  · Your diastolic blood pressure is above 90.  · You have diabetes, and your systolic blood pressure is over 140 or your diastolic blood pressure is over 90.  · You have kidney disease and your blood pressure is above 140/90.  · You have heart disease and your blood pressure is above 140/90.  Your personal target blood pressure may vary depending on your medical conditions, your age, and other factors.  Follow these instructions at home:  · Have your blood pressure rechecked as directed by your health care provider.  · Take medicines only as directed by your health care provider. Follow the directions carefully. Blood pressure medicines must be taken as prescribed. The medicine does not work as well when you skip doses. Skipping doses also puts you at risk for problems.  · Do not smoke.  · Monitor your blood pressure at home as directed by your health care provider.  Contact a health care provider if:  · You think you are having a reaction to medicines taken.  · You have recurrent headaches or feel dizzy.  · You have swelling in your ankles.  · You have trouble with your vision.  Get help right away if:  · You develop a severe headache or confusion.  · You have unusual weakness, numbness, or feel faint.  · You have severe chest or abdominal pain.  · You vomit repeatedly.  · You have trouble breathing.  This information is not intended to replace advice given to you by your health care provider. Make sure you discuss any questions you have with your health care provider.  Document Released: 12/18/2006 Document Revised: 05/25/2017 Document Reviewed:  10/10/2014  [employer] Interactive Patient Education © 2017 [employer]      Preventing Hypertension  Hypertension, commonly called high blood pressure, is when the force of blood pumping through the arteries is too strong. Arteries are blood vessels that carry blood from the heart throughout the body. Over time, hypertension can damage the arteries and decrease blood flow to important parts of the body, including the brain, heart, and kidneys. Often, hypertension does not cause symptoms until blood pressure is very high. For this reason, it is important to have your blood pressure checked on a regular basis.  Hypertension can often be prevented with diet and lifestyle changes. If you already have hypertension, you can control it with diet and lifestyle changes, as well as medicine.  What nutrition changes can be made?  Maintain a healthy diet. This includes:  · Eating less salt (sodium). Ask your health care provider how much sodium is safe for you to have. The general recommendation is to consume less than 1 tsp (2,300 mg) of sodium a day.  ¨ Do not add salt to your food.  ¨ Choose low-sodium options when grocery shopping and eating out.  · Limiting fats in your diet. You can do this by eating low-fat or fat-free dairy products and by eating less red meat.  · Eating more fruits, vegetables, and whole grains. Make a goal to eat:  ¨ 1½-2 cups of fresh fruits and vegetables each day.  ¨ 3-4 servings of whole grains each day.  · Avoiding foods and beverages that have added sugars.  · Eating fish that contain healthy fats (omega-3 fatty acids), such as mackerel or salmon.  If you need help putting together a healthy eating plan, try the DASH diet. This diet is high in fruits, vegetables, and whole grains. It is low in sodium, red meat, and added sugars. DASH stands for Dietary Approaches to Stop Hypertension.  What lifestyle changes can be made?  · Lose weight if you are overweight. Losing just 3?5% of your body weight  can help prevent or control hypertension.  ¨ For example, if your present weight is 200 lb (91 kg), a loss of 3-5% of your weight means losing 6-10 lb (2.7-4.5 kg).  ¨ Ask your health care provider to help you with a diet and exercise plan to safely lose weight.  · Get enough exercise. Do at least 150 minutes of moderate-intensity exercise each week.  ¨ You could do this in short exercise sessions several times a day, or you could do longer exercise sessions a few times a week. For example, you could take a brisk 10-minute walk or bike ride, 3 times a day, for 5 days a week.  · Find ways to reduce stress, such as exercising, meditating, listening to music, or taking a yoga class. If you need help reducing stress, ask your health care provider.  · Do not smoke. This includes e-cigarettes. Chemicals in tobacco and nicotine products raise your blood pressure each time you smoke. If you need help quitting, ask your health care provider.  · Avoid alcohol. If you drink alcohol, limit alcohol intake to no more than 1 drink a day for nonpregnant women and 2 drinks a day for men. One drink equals 12 oz of beer, 5 oz of wine, or 1½ oz of hard liquor.  Why are these changes important?  Diet and lifestyle changes can help you prevent hypertension, and they may make you feel better overall and improve your quality of life. If you have hypertension, making these changes will help you control it and help prevent major complications, such as:  · Hardening and narrowing of arteries that supply blood to:  ¨ Your heart. This can cause a heart attack.  ¨ Your brain. This can cause a stroke.  ¨ Your kidneys. This can cause kidney failure.  · Stress on your heart muscle, which can cause heart failure.  What can I do to lower my risk?  · Work with your health care provider to make a hypertension prevention plan that works for you. Follow your plan and keep all follow-up visits as told by your health care provider.  · Learn how to check  your blood pressure at home. Make sure that you know your personal target blood pressure, as told by your health care provider.  How is this treated?  In addition to diet and lifestyle changes, your health care provider may recommend medicines to help lower your blood pressure. You may need to try a few different medicines to find what works best for you. You also may need to take more than one medicine. Take over-the-counter and prescription medicines only as told by your health care provider.  Where to find support:  Your health care provider can help you prevent hypertension and help you keep your blood pressure at a healthy level. Your local hospital or your community may also provide support services and prevention programs.  The American Heart Association offers an online support network at: http://supportnetwork.heart.org/high-blood-pressure  Where to find more information:  Learn more about hypertension from:  · National Heart, Lung, and Blood [place]: www.nhlbi.nih.gov/health/health-topics/topics/hbp  · Centers for Disease Control and Prevention: www.cdc.gov/bloodpressure  · American Academy of Family Physicians: http://familydoctor.org/familydoctor/en/diseases-conditions/high-blood-pressure.printerview.all.html  Learn more about the DASH diet from:  · National Heart, Lung, and Blood [place]: www.nhlbi.nih.gov/health/health-topics/topics/dash  Contact a health care provider if:  · You think you are having a reaction to medicines you have taken.  · You have recurrent headaches or feel dizzy.  · You have swelling in your ankles.  · You have trouble with your vision.  Summary  · Hypertension often does not cause any symptoms until blood pressure is very high. It is important to get your blood pressure checked regularly.  · Diet and lifestyle changes are the most important steps in preventing hypertension.  · By keeping your blood pressure in a healthy range, you can prevent complications like heart attack,  heart failure, stroke, and kidney failure.  · Work with your health care provider to make a hypertension prevention plan that works for you.  This information is not intended to replace advice given to you by your health care provider. Make sure you discuss any questions you have with your health care provider.  Document Released: 01/01/2017 Document Revised: 08/28/2017 Document Reviewed: 08/28/2017  [facility] Interactive Patient Education © 2017 [facility] [employer]    Radial Site Care  Introduction  Refer to this sheet in the next few weeks. These instructions provide you with information about caring for yourself after your procedure. Your health care provider may also give you more specific instructions. Your treatment has been planned according to current medical practices, but problems sometimes occur. Call your health care provider if you have any problems or questions after your procedure.  What can I expect after the procedure?  After your procedure, it is typical to have the following:  · Bruising at the radial site that usually fades within 1-2 weeks.  · Blood collecting in the tissue (hematoma) that may be painful to the touch. It should usually decrease in size and tenderness within 1-2 weeks.  Follow these instructions at home:  · Take medicines only as directed by your health care provider.  · You may shower 24-48 hours after the procedure or as directed by your health care provider. Remove the bandage (dressing) and gently wash the site with plain soap and water. Pat the area dry with a clean towel. Do not rub the site, because this may cause bleeding.  · Do not take baths, swim, or use a hot tub until your health care provider approves.  · Check your insertion site every day for redness, swelling, or drainage.  · Do not apply powder or lotion to the site.  · Do not flex or bend the affected arm for 24 hours or as directed by your health care provider.  · Do not push or pull heavy objects with the affected  arm for 24 hours or as directed by your health care provider.  · Do not lift over 10 lb (4.5 kg) for 5 days after your procedure or as directed by your health care provider.  · Ask your health care provider when it is okay to:  ¨ Return to work or school.  ¨ Resume usual physical activities or sports.  ¨ Resume sexual activity.  · Do not drive home if you are discharged the same day as the procedure. Have someone else drive you.  · You may drive 24 hours after the procedure unless otherwise instructed by your health care provider.  · Do not operate machinery or power tools for 24 hours after the procedure.  · If your procedure was done as an outpatient procedure, which means that you went home the same day as your procedure, a responsible adult should be with you for the first 24 hours after you arrive home.  · Keep all follow-up visits as directed by your health care provider. This is important.  Contact a health care provider if:  · You have a fever.  · You have chills.  · You have increased bleeding from the radial site. Hold pressure on the site.  Get help right away if:  · You have unusual pain at the radial site.  · You have redness, warmth, or swelling at the radial site.  · You have drainage (other than a small amount of blood on the dressing) from the radial site.  · The radial site is bleeding, and the bleeding does not stop after 30 minutes of holding steady pressure on the site.  · Your arm or hand becomes pale, cool, tingly, or numb.  This information is not intended to replace advice given to you by your health care provider. Make sure you discuss any questions you have with your health care provider.  Document Released: 01/20/2012 Document Revised: 05/25/2017 Document Reviewed: 07/06/2015  © 2017 [employer]    Amlodipine tablets  What is this medicine?  AMLODIPINE (am TONJA di peen) is a calcium-channel blocker. It affects the amount of calcium found in your heart and muscle cells. This relaxes your blood  vessels, which can reduce the amount of work the heart has to do. This medicine is used to lower high blood pressure. It is also used to prevent chest pain.  This medicine may be used for other purposes; ask your health care provider or pharmacist if you have questions.  COMMON BRAND NAME(S): Norvasc  What should I tell my health care provider before I take this medicine?  They need to know if you have any of these conditions:  -heart problems like heart failure or aortic stenosis  -liver disease  -an unusual or allergic reaction to amlodipine, other medicines, foods, dyes, or preservatives  -pregnant or trying to get pregnant  -breast-feeding  How should I use this medicine?  Take this medicine by mouth with a glass of water. Follow the directions on the prescription label. Take your medicine at regular intervals. Do not take more medicine than directed.  Talk to your pediatrician regarding the use of this medicine in children. Special care may be needed. This medicine has been used in children as young as 6.  Persons over 65 years old may have a stronger reaction to this medicine and need smaller doses.  Overdosage: If you think you have taken too much of this medicine contact a poison control center or emergency room at once.  NOTE: This medicine is only for you. Do not share this medicine with others.  What if I miss a dose?  If you miss a dose, take it as soon as you can. If it is almost time for your next dose, take only that dose. Do not take double or extra doses.  What may interact with this medicine?  -herbal or dietary supplements  -local or general anesthetics  -medicines for high blood pressure  -medicines for prostate problems  -rifampin  This list may not describe all possible interactions. Give your health care provider a list of all the medicines, herbs, non-prescription drugs, or dietary supplements you use. Also tell them if you smoke, drink alcohol, or use illegal drugs. Some items may interact  with your medicine.  What should I watch for while using this medicine?  Visit your doctor or health care professional for regular check ups. Check your blood pressure and pulse rate regularly. Ask your health care professional what your blood pressure and pulse rate should be, and when you should contact him or her.  This medicine may make you feel confused, dizzy or lightheaded. Do not drive, use machinery, or do anything that needs mental alertness until you know how this medicine affects you. To reduce the risk of dizzy or fainting spells, do not sit or stand up quickly, especially if you are an older patient. Avoid alcoholic drinks; they can make you more dizzy.  Do not suddenly stop taking amlodipine. Ask your doctor or health care professional how you can gradually reduce the dose.  What side effects may I notice from receiving this medicine?  Side effects that you should report to your doctor or health care professional as soon as possible:  -allergic reactions like skin rash, itching or hives, swelling of the face, lips, or tongue  -breathing problems  -changes in vision or hearing  -chest pain  -fast, irregular heartbeat  -swelling of legs or ankles  Side effects that usually do not require medical attention (report to your doctor or health care professional if they continue or are bothersome):  -dry mouth  -facial flushing  -nausea, vomiting  -stomach gas, pain  -tired, weak  -trouble sleeping  This list may not describe all possible side effects. Call your doctor for medical advice about side effects. You may report side effects to FDA at 8-797-FDA-9913.  Where should I keep my medicine?  Keep out of the reach of children.  Store at room temperature between 59 and 86 degrees F (15 and 30 degrees C). Protect from light. Keep container tightly closed. Throw away any unused medicine after the expiration date.  NOTE: This sheet is a summary. It may not cover all possible information. If you have questions  about this medicine, talk to your doctor, pharmacist, or health care provider.  © 2018 [employer]/Gold Standard (2013-11-15 11:40:58)    Tramadol tablets  What is this medicine?  TRAMADOL (TRA ma dole) is a pain reliever. It is used to treat moderate to severe pain in adults.  This medicine may be used for other purposes; ask your health care provider or pharmacist if you have questions.  COMMON BRAND NAME(S): Ultram  What should I tell my health care provider before I take this medicine?  They need to know if you have any of these conditions:  -brain tumor  -depression  -drug abuse or addiction  -head injury  -if you frequently drink alcohol containing drinks  -kidney disease or trouble passing urine  -liver disease  -lung disease, asthma, or breathing problems  -seizures or epilepsy  -suicidal thoughts, plans, or attempt; a previous suicide attempt by you or a family member  -an unusual or allergic reaction to tramadol, codeine, other medicines, foods, dyes, or preservatives  -pregnant or trying to get pregnant  -breast-feeding  How should I use this medicine?  Take this medicine by mouth with a full glass of water. Follow the directions on the prescription label. You can take it with or without food. If it upsets your stomach, take it with food. Do not take your medicine more often than directed.  A special MedGuide will be given to you by the pharmacist with each prescription and refill. Be sure to read this information carefully each time.  Talk to your pediatrician regarding the use of this medicine in children. Special care may be needed.  Overdosage: If you think you have taken too much of this medicine contact a poison control center or emergency room at once.  NOTE: This medicine is only for you. Do not share this medicine with others.  What if I miss a dose?  If you miss a dose, take it as soon as you can. If it is almost time for your next dose, take only that dose. Do not take double or extra doses.  What  may interact with this medicine?  Do not take this medication with any of the following medicines:  -MAOIs like Carbex, Eldepryl, Marplan, Nardil, and Parnate  This medicine may also interact with the following medications:  -alcohol  -antihistamines for allergy, cough and cold  -certain medicines for anxiety or sleep  -certain medicines for depression like amitriptyline, fluoxetine, sertraline  -certain medicines for migraine headache like almotriptan, eletriptan, frovatriptan, naratriptan, rizatriptan, sumatriptan, zolmitriptan  -certain medicines for seizures like carbamazepine, oxcarbazepine, phenobarbital, primidone  -certain medicines that treat or prevent blood clots like warfarin  -digoxin  -furazolidone  -general anesthetics like halothane, isoflurane, methoxyflurane, propofol  -linezolid  -local anesthetics like lidocaine, pramoxine, tetracaine  -medicines that relax muscles for surgery  -other narcotic medicines for pain or cough  -phenothiazines like chlorpromazine, mesoridazine, prochlorperazine, thioridazine  -procarbazine  This list may not describe all possible interactions. Give your health care provider a list of all the medicines, herbs, non-prescription drugs, or dietary supplements you use. Also tell them if you smoke, drink alcohol, or use illegal drugs. Some items may interact with your medicine.  What should I watch for while using this medicine?  Tell your doctor or health care professional if your pain does not go away, if it gets worse, or if you have new or a different type of pain. You may develop tolerance to the medicine. Tolerance means that you will need a higher dose of the medicine for pain relief. Tolerance is normal and is expected if you take this medicine for a long time.  Do not suddenly stop taking your medicine because you may develop a severe reaction. Your body becomes used to the medicine. This does NOT mean you are addicted. Addiction is a behavior related to getting and  using a drug for a non-medical reason. If you have pain, you have a medical reason to take pain medicine. Your doctor will tell you how much medicine to take. If your doctor wants you to stop the medicine, the dose will be slowly lowered over time to avoid any side effects.  There are different types of narcotic medicines (opiates). If you take more than one type at the same time or if you are taking another medicine that also causes drowsiness, you may have more side effects. Give your health care provider a list of all medicines you use. Your doctor will tell you how much medicine to take. Do not take more medicine than directed. Call emergency for help if you have problems breathing or unusual sleepiness.  You may get drowsy or dizzy. Do not drive, use machinery, or do anything that needs mental alertness until you know how this medicine affects you. Do not stand or sit up quickly, especially if you are an older patient. This reduces the risk of dizzy or fainting spells. Alcohol can increase or decrease the effects of this medicine. Avoid alcoholic drinks.  You may have constipation. Try to have a bowel movement at least every 2 to 3 days. If you do not have a bowel movement for 3 days, call your doctor or health care professional.  Your mouth may get dry. Chewing sugarless gum or sucking hard candy, and drinking plenty of water may help. Contact your doctor if the problem does not go away or is severe.  What side effects may I notice from receiving this medicine?  Side effects that you should report to your doctor or health care professional as soon as possible:  -allergic reactions like skin rash, itching or hives, swelling of the face, lips, or tongue  -breathing problems  -confusion  -seizures  -signs and symptoms of low blood pressure like dizziness; feeling faint or lightheaded, falls; unusually weak or tired  -trouble passing urine or change in the amount of urine  Side effects that usually do not require  medical attention (report to your doctor or health care professional if they continue or are bothersome):  -constipation  -dry mouth  -nausea, vomiting  -tiredness  This list may not describe all possible side effects. Call your doctor for medical advice about side effects. You may report side effects to FDA at 1-808-EGH-7681.  Where should I keep my medicine?  Keep out of the reach of children.  This medicine may cause accidental overdose and death if it taken by other adults, children, or pets. Mix any unused medicine with a substance like cat litter or coffee grounds. Then throw the medicine away in a sealed container like a sealed bag or a coffee can with a lid. Do not use the medicine after the expiration date.  Store at room temperature between 15 and 30 degrees C (59 and 86 degrees F).  NOTE: This sheet is a summary. It may not cover all possible information. If you have questions about this medicine, talk to your doctor, pharmacist, or health care provider.  © 2018 Elsevier/Gold Standard (2016-09-11 09:00:04)            Depression / Suicide Risk    As you are discharged from this [facility] facility, it is important to learn how to keep safe from harming yourself.    Recognize the warning signs:  · Abrupt changes in personality, positive or negative- including increase in energy   · Giving away possessions  · Change in eating patterns- significant weight changes-  positive or negative  · Change in sleeping patterns- unable to sleep or sleeping all the time   · Unwillingness or inability to communicate  · Depression  · Unusual sadness, discouragement and loneliness  · Talk of wanting to die  · Neglect of personal appearance   · Rebelliousness- reckless behavior  · Withdrawal from people/activities they love  · Confusion- inability to concentrate     If you or a loved one observes any of these behaviors or has concerns about self-harm, here's what you can do:  · Talk about it- your feelings and reasons for  harming yourself  · Remove any means that you might use to hurt yourself (examples: pills, rope, extension cords, firearm)  · Get professional help from the community (Mental Health, Substance Abuse, psychological counseling)  · Do not be alone:Call your Safe Contact- someone whom you trust who will be there for you.  · Call your local CRISIS HOTLINE 203-4886 or 659-692-5407  · Call your local Children's Mobile Crisis Response Team [place] (181) 766-9058 or www.[employer]  · Call the toll free National Suicide Prevention Hotlines   · National Suicide Prevention Lifeline 845-486-PZFG (3227)  · National Hope Line Network 800-SUICIDE (971-2856)

## 2019-11-22 NOTE — PROGRESS NOTES
Pt dc'd home. IV and monitor removed; monitor room notified. Pt left unit via wheelchair with friend. Personal belongings with pt when leaving unit. Pt given discharge instructions prior to leaving unit including prescription and when to visit with physician; verbalizes understanding. Copy of discharge instructions with pt and in the chart. Spoke with Dr. Snyder regarding Pt's high blood pressure, ok to discharge per Dr. Gavin.

## 2019-12-04 ENCOUNTER — TELEPHONE (OUTPATIENT)
Dept: CARDIOLOGY | Facility: MEDICAL CENTER | Age: 54
End: 2019-12-04

## 2019-12-04 NOTE — TELEPHONE ENCOUNTER
LM to remind patient to complete non-fasting labs before upcoming appt with NAVJOT Pratt on 12/11.

## 2019-12-06 DIAGNOSIS — I25.10 CORONARY ARTERY DISEASE INVOLVING NATIVE CORONARY ARTERY OF NATIVE HEART WITHOUT ANGINA PECTORIS: Primary | ICD-10-CM

## 2019-12-06 RX ORDER — METOPROLOL SUCCINATE 200 MG/1
TABLET, EXTENDED RELEASE ORAL
Qty: 90 TAB | Refills: 3 | Status: SHIPPED | OUTPATIENT
Start: 2019-12-06

## 2019-12-11 ENCOUNTER — OFFICE VISIT (OUTPATIENT)
Dept: CARDIOLOGY | Facility: MEDICAL CENTER | Age: 54
End: 2019-12-11
Payer: COMMERCIAL

## 2019-12-11 ENCOUNTER — TELEPHONE (OUTPATIENT)
Dept: VASCULAR LAB | Facility: MEDICAL CENTER | Age: 54
End: 2019-12-11

## 2019-12-11 ENCOUNTER — ANTICOAGULATION VISIT (OUTPATIENT)
Dept: VASCULAR LAB | Facility: MEDICAL CENTER | Age: 54
End: 2019-12-11
Attending: INTERNAL MEDICINE
Payer: COMMERCIAL

## 2019-12-11 VITALS
WEIGHT: 168.6 LBS | OXYGEN SATURATION: 96 % | DIASTOLIC BLOOD PRESSURE: 68 MMHG | HEART RATE: 62 BPM | HEIGHT: 62 IN | SYSTOLIC BLOOD PRESSURE: 136 MMHG | BODY MASS INDEX: 31.03 KG/M2

## 2019-12-11 DIAGNOSIS — I50.20 ACC/AHA STAGE C SYSTOLIC HEART FAILURE (HCC): ICD-10-CM

## 2019-12-11 DIAGNOSIS — I48.19 PERSISTENT ATRIAL FIBRILLATION (HCC): ICD-10-CM

## 2019-12-11 DIAGNOSIS — I50.9 HEART FAILURE, NYHA CLASS 2 (HCC): ICD-10-CM

## 2019-12-11 DIAGNOSIS — I63.512 CEREBRAL INFARCTION DUE TO OCCLUSION OF LEFT MIDDLE CEREBRAL ARTERY (HCC): ICD-10-CM

## 2019-12-11 DIAGNOSIS — E66.9 OBESITY (BMI 30-39.9): ICD-10-CM

## 2019-12-11 DIAGNOSIS — I10 HTN (HYPERTENSION), MALIGNANT: ICD-10-CM

## 2019-12-11 DIAGNOSIS — I65.23 BILATERAL CAROTID ARTERY STENOSIS: ICD-10-CM

## 2019-12-11 DIAGNOSIS — I72.1: ICD-10-CM

## 2019-12-11 DIAGNOSIS — I25.10 CORONARY ARTERY DISEASE INVOLVING NATIVE CORONARY ARTERY OF NATIVE HEART WITHOUT ANGINA PECTORIS: ICD-10-CM

## 2019-12-11 DIAGNOSIS — I25.5 ISCHEMIC CARDIOMYOPATHY: ICD-10-CM

## 2019-12-11 DIAGNOSIS — Z79.899 HIGH RISK MEDICATION USE: ICD-10-CM

## 2019-12-11 PROCEDURE — 99213 OFFICE O/P EST LOW 20 MIN: CPT | Performed by: PHARMACIST

## 2019-12-11 PROCEDURE — 99214 OFFICE O/P EST MOD 30 MIN: CPT | Performed by: NURSE PRACTITIONER

## 2019-12-11 ASSESSMENT — ENCOUNTER SYMPTOMS
PALPITATIONS: 0
CLAUDICATION: 0
SHORTNESS OF BREATH: 1
PND: 0
COUGH: 0
ORTHOPNEA: 0
DIZZINESS: 0
ABDOMINAL PAIN: 0
FEVER: 0
MYALGIAS: 0

## 2019-12-11 NOTE — PROGRESS NOTES
Target end date: Indefinite     Indication: Afib     Drug: Xarelto 10mg daily     CHADsVASC = 7 (sex, CHF, HTN, DM2, CAD, stroke)    Pt has been on Xarelto 20mg daily for the past few months. Denies any issues. Has been on triple therapy. Pt's insurance is no longer covering the Xarelto.     Health Status Since Last Assessment   Patient denies any new relevant medical problems, ED visits or hospitalizations   Patient denies any embolic events (stroke/tia/systemic embolism)    Adherence with DOAC Therapy   Pt has NOT missed any doses in the average week, took her last Xarelto dose yesterday.     Bleeding Risk Assessment     Denies -  Epistaxis   Pt denies any excessive or unusual bleeding/hematomas.  Pt denies any GI bleeds or hematemesis.  Pt denies any concerning daily headache or sub dural hematoma symptoms.     Pt denies any hematuria or abnormal vaginal bleeding.   ETOH overuse - denies     Results for PHU LIANG (MRN [id number]) as of 12/11/2019 14:53   Ref. Range 11/20/2019 12:24   Hemoglobin Latest Ref Range: 12.0 - 16.0 g/dL 10.3 (L)   Hematocrit Latest Ref Range: 37.0 - 47.0 % 33.8 (L)     Results for PHU LIANG (MRN [id number]) as of 12/11/2019 14:53   Ref. Range 11/20/2019 12:24   Hemoglobin Latest Ref Range: 12.0 - 16.0 g/dL 10.3 (L)   Hematocrit Latest Ref Range: 37.0 - 47.0 % 33.8 (L)     Creatinine Clearance/Renal Function     Latest ClCr ~ 75 ml/min    Hepatic function    Results for PHU LIANG (MRN [id number]) as of 12/11/2019 14:53   Ref. Range 11/18/2019 02:07   AST(SGOT) Latest Ref Range: 12 - 45 U/L 15   ALT(SGPT) Latest Ref Range: 2 - 50 U/L 12        Pt denies any history of liver dysfunction      Drug Interactions   ASA/other antiplatelets on ASA and plavix.    NSAID - denies   Other drug interactions - none   Verified no anticonvulsant or azole therapy, education provided for future use.     Examination   Symptomatic hypotension -  none   Significant gait impairment/imbalance/high risk for falls? no    Final Assessment and Recommendations:   Patient appears stable from the anticoagulation staindpoint.     Benefits of continued DOAC therapy outweigh risks for this patient   Recommend pt continue with current DOAC therapy     As pt is on triple therapy and needs to continue to be so will have pt switch over to Eliquis 5mg BID starting tonight. Provided samples, sent Rx to pharmacy and gave pt copay card to activate.     Pt will most likely transfer to PCP as she lives out of the area for management, however she will let us know.      Other Actions: cmp/ cbc hemogram ordered prior to next visit    Follow up:   Will follow up with patient 1 month    Chapis Jerome, PharmD

## 2019-12-11 NOTE — PROGRESS NOTES
Chief Complaint   Patient presents with   • Congestive Heart Failure       Subjective:   Judith Vallecillo is a 54 y.o. female who presents today for follow up on her heart failure, A. fib, CAD.      Patient of the heart failure clinic.  She was last seen in clinic on 11/12/2019 with Dr. Westfall.  During that visit, patient was reporting right arm weakness and numbness.  She was sent for an ultrasound to evaluate.  In addition, she was sent for a SONIA and cardioversion.  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 for management.    While in the hospital, her blood pressure was uncontrolled and her medications were changed to improve her blood pressure.    Patient reports since her discharge, she has been feeling well.  She continues to have right fourth and fifth finger numbness.  She reports she continues to do surgery, but is limited to 1/day.  She denies chest pain, palpitations, orthopnea, PND, edema, shortness of breath at rest or with ADLs or dizziness/lightheadedness.  He reports mild dyspnea with heavier exertion    She reports her home weights are stable between 165-167 pounds.    She is reporting high cost of her Xarelto, and is planning a follow-up with anticoagulation clinic today.  She would like to switch over to a more cost effective option.    Additonally, patient has the following medical problems:    -Hospitalization from 10/18/2019 through 10/20/2019.  Patient was admitted for expressive aphasia from an [facility] hospital.  She is found to be in A. fib with RVR, biventricular heart failure.  She was going to have a cardioversion, but her SONIA showed an STELLA thrombus.  Patient also had an angiogram which she received 2 drug-eluting stents to the LAD.  While she was in the hospital, she also had an acute stroke.    -Diabetes: Previously not on treatment, currently taking glipizide    -Hypertension: Taking amlodipine, hydrochlorothiazide,  losartan, spironolactone and Toprol-XL    -Hyperlipidemia: Taking atorvastatin    -Smoker, quit during her hospitalization    -History of a stroke    Past Medical History:   Diagnosis Date   • Atrial fibrillation (HCC)    • CAD (coronary artery disease)    • CHF (congestive heart failure) (HCC)    • Diabetes (HCC)    • Hyperlipidemia    • Hypertension    • Ischemic cardiomyopathy    • Pneumonia 1975    as a child   • Stroke (HCC) 2011    right-sided weakness at that time-resolved     Past Surgical History:   Procedure Laterality Date   • AV FISTULA CREATION Right 2019    Procedure: CREATION, AV FISTULA-ULNAR ARTERY PSEUDOANEURYSM REPAIR;  Surgeon: Ishaan Lechuga M.D.;  Location: SURGERY [facility];  Service: Vascular   • ZZZ CARDIAC CATH  10/24/2019    KAYCE x2 to mid and distal LAD   • RECOVERY  2011    Performed by SURGERY, RECOVERYONMARIUM at [facility]     Family History   Problem Relation Age of Onset   • Heart Disease Mother         Stents   • Heart Disease Father    • Other Father         Pancreatitis   • Hypertension Sister    • Hypertension Brother    • Hypertension Sister    • GI Disease Brother         Crohns   • Hypertension Brother      Social History     Socioeconomic History   • Marital status: Single     Spouse name: Not on file   • Number of children: Not on file   • Years of education: Not on file   • Highest education level: Not on file   Occupational History   • Not on file   Social Needs   • Financial resource strain: Not on file   • Food insecurity:     Worry: Not on file     Inability: Not on file   • Transportation needs:     Medical: Not on file     Non-medical: Not on file   Tobacco Use   • Smoking status: Former Smoker     Packs/day: 0.50     Years: 20.00     Pack years: 10.00     Types: Cigarettes     Last attempt to quit: 10/18/2019     Years since quittin.1   • Smokeless tobacco: Never Used   • Tobacco comment: Quit smoking when she went into the hospital    Substance and Sexual Activity   • Alcohol use: Not Currently     Frequency: Never     Comment: very rare drink of a beer or wine   • Drug use: No   • Sexual activity: Not on file   Lifestyle   • Physical activity:     Days per week: Not on file     Minutes per session: Not on file   • Stress: Not on file   Relationships   • Social connections:     Talks on phone: Not on file     Gets together: Not on file     Attends Yazdanism service: Not on file     Active member of club or organization: Not on file     Attends meetings of clubs or organizations: Not on file     Relationship status: Not on file   • Intimate partner violence:     Fear of current or ex partner: Not on file     Emotionally abused: Not on file     Physically abused: Not on file     Forced sexual activity: Not on file   Other Topics Concern   • Not on file   Social History Narrative   • Not on file     Allergies   Allergen Reactions   • Bee Anaphylaxis     Swelling and airway closure.   • Latex Itching and Swelling     Swelling, redness, itching.   • Strawberry Itching     itching     Outpatient Encounter Medications as of 12/11/2019   Medication Sig Dispense Refill   • metoprolol (TOPROL-XL) 200 MG XL tablet TAKE 1 TABLET BY MOUTH ONCE DAILY 90 Tab 3   • amLODIPine (NORVASC) 10 MG Tab Take 1 Tab by mouth every day. 30 Tab 1   • losartan (COZAAR) 100 MG Tab Take 1 Tab by mouth every day. 30 Tab 2   • hydroCHLOROthiazide (HYDRODIURIL) 12.5 MG tablet Take 1 Tab by mouth every day. 30 Tab 1   • aspirin EC 81 MG EC tablet Take 1 Tab by mouth every day. 30 Tab 1   • digoxin (LANOXIN) 250 MCG Tab Take 1 Tab by mouth every day at 6 PM. 30 Tab 11   • spironolactone (ALDACTONE) 25 MG Tab Take 1 Tab by mouth every day. 30 Tab 11   • clopidogrel (PLAVIX) 75 MG Tab Take 1 Tab by mouth every day. 30 Tab 11   • rivaroxaban (XARELTO) 20 MG Tab tablet Take 1 Tab by mouth with dinner. 30 Tab 0   • glipiZIDE (GLUCOTROL) 5 MG Tab Take 0.5 Tabs by mouth 2 times a day.  "30 Tab 0   • atorvastatin (LIPITOR) 40 MG Tab Take 1 Tab by mouth every evening. 90 Tab 3   • metoprolol (TOPROL-XL) 200 MG XL tablet Take 1 Tab by mouth every day. (Patient not taking: Reported on 12/11/2019) 30 Tab 0     No facility-administered encounter medications on file as of 12/11/2019.      Review of Systems   Constitutional: Positive for malaise/fatigue (improving). Negative for fever.   Respiratory: Positive for shortness of breath (With heavier exertion). Negative for cough.    Cardiovascular: Negative for chest pain, palpitations, orthopnea, claudication, leg swelling and PND.   Gastrointestinal: Negative for abdominal pain.   Musculoskeletal: Negative for myalgias.   Neurological: Negative for dizziness.        Right last 2 finger on right hand-numbness   All other systems reviewed and are negative.       Objective:   /68 (BP Location: Left arm, Patient Position: Sitting, BP Cuff Size: Adult)   Pulse 62   Ht 1.575 m (5' 2\")   Wt 76.5 kg (168 lb 9.6 oz)   SpO2 96%   BMI 30.84 kg/m²     Physical Exam   Constitutional: She is oriented to person, place, and time. She appears well-developed and well-nourished.   HENT:   Head: Normocephalic and atraumatic.   Eyes: Pupils are equal, round, and reactive to light. EOM are normal.   Neck: Normal range of motion. Neck supple. No JVD present.   Cardiovascular: Normal rate, regular rhythm and normal heart sounds.   Pulses:       Radial pulses are 2+ on the right side and 2+ on the left side.   Pulmonary/Chest: Effort normal and breath sounds normal. No respiratory distress. She has no wheezes. She has no rales.   Abdominal: Soft. Bowel sounds are normal.   Musculoskeletal:         General: No edema.   Neurological: She is alert and oriented to person, place, and time.   Skin: Skin is warm and dry.   Psychiatric: She has a normal mood and affect. Her behavior is normal.   Vitals reviewed.    Lab Results   Component Value Date/Time    CHOLSTRLTOT 117 " 10/20/2019 02:59 AM    LDL 62 10/20/2019 02:59 AM    HDL 23 (A) 10/20/2019 02:59 AM    TRIGLYCERIDE 162 (H) 10/20/2019 02:59 AM       Lab Results   Component Value Date/Time    SODIUM 143 11/20/2019 12:24 PM    POTASSIUM 4.0 11/20/2019 12:24 PM    CHLORIDE 110 11/20/2019 12:24 PM    CO2 25 11/20/2019 12:24 PM    GLUCOSE 132 (H) 11/20/2019 12:24 PM    BUN 10 11/20/2019 12:24 PM    CREATININE 0.98 11/20/2019 12:24 PM     Lab Results   Component Value Date/Time    ALKPHOSPHAT 108 (H) 11/18/2019 02:07 AM    ASTSGOT 15 11/18/2019 02:07 AM    ALTSGPT 12 11/18/2019 02:07 AM    TBILIRUBIN 0.9 11/18/2019 02:07 AM      Transthoracic Echo Report 10/19/2019  Four chamber dilation. Biventricular failure. LV systolic function is   severely reduced. LVEF 20% with global hypokinesis. No significant   valuvlar disease visualized on this study. RVSP estimated at 40 mmHg.   IVC is dilated and fails to collapse with inspiration suggesting   elevated central venous pressures. Compared to the images of the study done on 06/01/2011 there has been a severe decline in biventricular function.    Carotid Duplex Report 10/19/2019   Mild bilateral internal carotid artery stenosis (<50%).    Thyroid nodules measuring less than 1 cm.    CT calcium score 10/23/2019  FINDINGS:    Coronary calcification:  LMA - 0.0  LCX - 117  LAD - 246  RCA - 51    Total Calcium Score: 414    Percentile: Calcium score is worse than the 90th percentile for the patient's age and sex.    Other findings:  Heart: Moderate enlargement especially of the left ventricle.  Lungs: Incompletely visualized right pleural effusion and basilar consolidation.  Mediastinum: Normal.  Central pulmonary arteries are enlarged with rapid peripheral tapering  Small pericardial effusion  Upper abdomen: Normal.    Breathing motion artifact        Heart Cath 10/24/2019  Post-operative Diagnosis:   1.  Single-vessel coronary artery disease with 70% stenosis in mid left anterior descending  artery (IFR 0.88) and 90% distal left anterior descending artery stenosis  2.  Status post stenting of the mid left anterior descending artery with 3 x 15 mm Xience Deepika drug eluting stent and 2.5 x 15 mm Xience Deepika drug eluting stent to the distal LAD  3.  Cardiomyopathy likely predominantly from nonischemic etiology, probably tachycardia induced  4.  Atrial fibrillation  Assessment:     1. ACC/AHA stage C systolic heart failure (HCC)     2. Heart failure, NYHA class 2 (HCC)     3. Ischemic cardiomyopathy     4. Coronary artery disease involving native coronary artery of native heart without angina pectoris     5. Persistent atrial fibrillation  CL-CARDIOVERSION    EC-SONIA W/ CONT   6. High risk medication use     7. HTN (hypertension), malignant     8. Cerebral infarction due to occlusion of left middle cerebral artery (HCC)     9. Bilateral carotid artery stenosis     10. Obesity (BMI 30-39.9)     11. Aneurysm of right ulnar artery (HCC)         Medical Decision Making:  Today's Assessment / Status / Plan:   1. HFrEF, Stage C, Class 2, LVEF 20%: Based on physical examination findings, patient is euvolemic. No JVD, lungs are clear to auscultation, no pitting edema in bilateral lower extremities, no ascites.  -Continue losartan 100 mg daily  -Continue spironolactone 25 mg daily  -Continue digoxin to 250 mcg daily  -Continue Toprol- mg daily  -Reinforced s/sx of worsening heart failure with patient and weight monitoring. Pt verbalizes understanding. Pt to call office or RTC if present.   -Reinforced education to Maintain adequate hydration and 2 gram sodium diet.    2.  CAD, a/p KAYCE x2 to the LAD: Last LDL 62 on 10/20/2019  -Continue aspirin 81 mg daily (resumed after Pseudoaneurysm)  -Continue clopidogrel 75 mg daily  -Continue atorvastatin 40 mg daily  -Encouraged continued smoking cessation    3.  Atrial fibrillation: controlled  -Continue digoxin to 250 mcg daily  -Continue Xarelto 20 mg daily, patient  has appointment with anticoagulation clinic today, will discuss alternative, affordable options  -Discussed bleeding precautions    4.  Hypertension: Stable  -Continue recommendations per above and amlodipine 10 mg daily, hydrochlorothiazide 12.5 mg daily    5.  Stroke:  -Continue statin, aspirin and Plavix per above  -Patient has mild bilateral carotid stenosis  -FU with PCP    6.  Diabetes:  -Encourage patient to follow-up with PCP  -Continue glipizide 2.5 mg twice a day    7.  Obesity: BMI 30.4  -Encouraged continued weight loss    8.  Pseudoaneurysm, s/p Repair  -Patient recommended to follow-up with vascular soon    FU in clinic in 2 weeks after cardioversion. Sooner if needed.    Patient verbalizes understanding and agrees with the plan of care.     Collaborating MD: ALEX Bolden MD

## 2019-12-11 NOTE — TELEPHONE ENCOUNTER
Initial anticoagulation clinic note and most recent cardiology note reviewed    Patient with atrial fibrillation and CAD status post stent about 6 weeks ago.  Has history of stroke and cHADS-VASC score = 7    Patient was seen earlier today by cardiology who recommended continuing aspirin, clopidogrel, and anticoagulation (triple therapy) for now.  We will defer to that recommendation.  Will defer timing of dropping 1 of her antiplatelet agents to cardiology    Pending further recommendations, we will continue with indefinite anticoagulation with Eliquis [facility] J[facility] Bloch, MD  [facility]    Cc: CRISTOFER Thibodeaux

## 2019-12-12 ENCOUNTER — TELEPHONE (OUTPATIENT)
Dept: CARDIOLOGY | Facility: MEDICAL CENTER | Age: 54
End: 2019-12-12

## 2019-12-12 NOTE — TELEPHONE ENCOUNTER
----- Message from Francoise Zapata, Med Ass't sent at 12/11/2019  2:01 PM PST -----  Lucho Lyon has a place order for SONIA and Cardioversion . Could you please called her whenever you can.     Thank you :)

## 2019-12-13 NOTE — TELEPHONE ENCOUNTER
Called patient and left a message for patient to ask vascular surgeon how long she needs to be on aspirin.  She is on triple therapy with aspirin, clopidogrel and Eliquis.  We will also send patient a my chart message.

## 2019-12-17 ENCOUNTER — TELEPHONE (OUTPATIENT)
Dept: CARDIOLOGY | Facility: MEDICAL CENTER | Age: 54
End: 2019-12-17

## 2019-12-18 ENCOUNTER — HOSPITAL ENCOUNTER (OUTPATIENT)
Dept: RADIOLOGY | Facility: MEDICAL CENTER | Age: 54
End: 2019-12-18

## 2019-12-18 NOTE — TELEPHONE ENCOUNTER
Patient is scheduled on 12-26-19 for a SONIA/CV with Dr. Andrea with anesthesia. Patient was told to hold glipizide AM day of procedure and to check in at 12:30 for a 2:30 procedure. H&P was done on 12-11-19 by Sonali Thibodeaux. Pre admit to call patient due to her living out of area.

## 2019-12-23 ENCOUNTER — TELEPHONE (OUTPATIENT)
Dept: CARDIOLOGY | Facility: MEDICAL CENTER | Age: 54
End: 2019-12-23

## 2019-12-23 NOTE — TELEPHONE ENCOUNTER
Left another message for patient to alert us of her vascular surgeon's recommendations on her aspirin.  Patient currently on triple therapy, would like to discontinue either aspirin or Plavix per vascular surgery recommendation.

## 2019-12-26 ENCOUNTER — APPOINTMENT (OUTPATIENT)
Dept: CARDIOLOGY | Facility: MEDICAL CENTER | Age: 54
End: 2019-12-26
Attending: INTERNAL MEDICINE
Payer: COMMERCIAL

## 2019-12-26 ENCOUNTER — HOSPITAL ENCOUNTER (OUTPATIENT)
Facility: MEDICAL CENTER | Age: 54
End: 2019-12-26
Attending: INTERNAL MEDICINE | Admitting: INTERNAL MEDICINE
Payer: COMMERCIAL

## 2019-12-26 ENCOUNTER — ANESTHESIA (OUTPATIENT)
Dept: CARDIOLOGY | Facility: MEDICAL CENTER | Age: 54
End: 2019-12-26
Payer: COMMERCIAL

## 2019-12-26 ENCOUNTER — ANESTHESIA EVENT (OUTPATIENT)
Dept: CARDIOLOGY | Facility: MEDICAL CENTER | Age: 54
End: 2019-12-26
Payer: COMMERCIAL

## 2019-12-26 VITALS
HEART RATE: 56 BPM | OXYGEN SATURATION: 97 % | WEIGHT: 169.53 LBS | SYSTOLIC BLOOD PRESSURE: 123 MMHG | RESPIRATION RATE: 15 BRPM | DIASTOLIC BLOOD PRESSURE: 57 MMHG | HEIGHT: 62 IN | TEMPERATURE: 98.6 F | BODY MASS INDEX: 31.2 KG/M2

## 2019-12-26 DIAGNOSIS — I48.19 PERSISTENT ATRIAL FIBRILLATION (HCC): ICD-10-CM

## 2019-12-26 DIAGNOSIS — I25.10 CORONARY ARTERY DISEASE INVOLVING NATIVE CORONARY ARTERY OF NATIVE HEART WITHOUT ANGINA PECTORIS: ICD-10-CM

## 2019-12-26 DIAGNOSIS — I63.512 CEREBRAL INFARCTION DUE TO OCCLUSION OF LEFT MIDDLE CEREBRAL ARTERY (HCC): ICD-10-CM

## 2019-12-26 DIAGNOSIS — F17.200 SMOKER: ICD-10-CM

## 2019-12-26 DIAGNOSIS — E11.65 TYPE 2 DIABETES MELLITUS WITH HYPERGLYCEMIA, WITHOUT LONG-TERM CURRENT USE OF INSULIN (HCC): ICD-10-CM

## 2019-12-26 DIAGNOSIS — E66.09 CLASS 2 OBESITY DUE TO EXCESS CALORIES WITHOUT SERIOUS COMORBIDITY WITH BODY MASS INDEX (BMI) OF 36.0 TO 36.9 IN ADULT: ICD-10-CM

## 2019-12-26 LAB
ANION GAP SERPL CALC-SCNC: 10 MMOL/L (ref 0–11.9)
BUN SERPL-MCNC: 24 MG/DL (ref 8–22)
CALCIUM SERPL-MCNC: 9.8 MG/DL (ref 8.5–10.5)
CHLORIDE SERPL-SCNC: 102 MMOL/L (ref 96–112)
CO2 SERPL-SCNC: 23 MMOL/L (ref 20–33)
CREAT SERPL-MCNC: 1.43 MG/DL (ref 0.5–1.4)
EKG IMPRESSION: NORMAL
EKG IMPRESSION: NORMAL
ERYTHROCYTE [DISTWIDTH] IN BLOOD BY AUTOMATED COUNT: 46.8 FL (ref 35.9–50)
GLUCOSE SERPL-MCNC: 169 MG/DL (ref 65–99)
HCT VFR BLD AUTO: 37.3 % (ref 37–47)
HGB BLD-MCNC: 12.5 G/DL (ref 12–16)
INR PPP: 1.2 (ref 0.87–1.13)
MCH RBC QN AUTO: 30.7 PG (ref 27–33)
MCHC RBC AUTO-ENTMCNC: 33.5 G/DL (ref 33.6–35)
MCV RBC AUTO: 91.6 FL (ref 81.4–97.8)
PLATELET # BLD AUTO: 324 K/UL (ref 164–446)
PMV BLD AUTO: 10.6 FL (ref 9–12.9)
POTASSIUM SERPL-SCNC: 4.3 MMOL/L (ref 3.6–5.5)
PROTHROMBIN TIME: 15.5 SEC (ref 12–14.6)
RBC # BLD AUTO: 4.07 M/UL (ref 4.2–5.4)
SODIUM SERPL-SCNC: 135 MMOL/L (ref 135–145)
WBC # BLD AUTO: 8.5 K/UL (ref 4.8–10.8)

## 2019-12-26 PROCEDURE — 93312 ECHO TRANSESOPHAGEAL: CPT | Mod: 26 | Performed by: INTERNAL MEDICINE

## 2019-12-26 PROCEDURE — 93010 ELECTROCARDIOGRAM REPORT: CPT | Performed by: INTERNAL MEDICINE

## 2019-12-26 PROCEDURE — 85610 PROTHROMBIN TIME: CPT

## 2019-12-26 PROCEDURE — 80048 BASIC METABOLIC PNL TOTAL CA: CPT

## 2019-12-26 PROCEDURE — 92960 CARDIOVERSION ELECTRIC EXT: CPT | Performed by: INTERNAL MEDICINE

## 2019-12-26 PROCEDURE — 304952 CL-CARDIOVERSION

## 2019-12-26 PROCEDURE — 700111 HCHG RX REV CODE 636 W/ 250 OVERRIDE (IP): Performed by: ANESTHESIOLOGY

## 2019-12-26 PROCEDURE — 85027 COMPLETE CBC AUTOMATED: CPT

## 2019-12-26 PROCEDURE — 93312 ECHO TRANSESOPHAGEAL: CPT

## 2019-12-26 PROCEDURE — 160002 HCHG RECOVERY MINUTES (STAT)

## 2019-12-26 PROCEDURE — 93005 ELECTROCARDIOGRAM TRACING: CPT | Performed by: INTERNAL MEDICINE

## 2019-12-26 RX ORDER — OXYCODONE HCL 5 MG/5 ML
10 SOLUTION, ORAL ORAL
Status: DISCONTINUED | OUTPATIENT
Start: 2019-12-26 | End: 2019-12-26 | Stop reason: HOSPADM

## 2019-12-26 RX ORDER — OXYCODONE HCL 5 MG/5 ML
5 SOLUTION, ORAL ORAL
Status: DISCONTINUED | OUTPATIENT
Start: 2019-12-26 | End: 2019-12-26 | Stop reason: HOSPADM

## 2019-12-26 RX ORDER — MEPERIDINE HYDROCHLORIDE 25 MG/ML
12.5 INJECTION INTRAMUSCULAR; INTRAVENOUS; SUBCUTANEOUS
Status: DISCONTINUED | OUTPATIENT
Start: 2019-12-26 | End: 2019-12-26 | Stop reason: HOSPADM

## 2019-12-26 RX ORDER — SODIUM CHLORIDE, SODIUM LACTATE, POTASSIUM CHLORIDE, CALCIUM CHLORIDE 600; 310; 30; 20 MG/100ML; MG/100ML; MG/100ML; MG/100ML
INJECTION, SOLUTION INTRAVENOUS CONTINUOUS
Status: DISCONTINUED | OUTPATIENT
Start: 2019-12-26 | End: 2019-12-26 | Stop reason: HOSPADM

## 2019-12-26 RX ORDER — ONDANSETRON 2 MG/ML
4 INJECTION INTRAMUSCULAR; INTRAVENOUS
Status: DISCONTINUED | OUTPATIENT
Start: 2019-12-26 | End: 2019-12-26 | Stop reason: HOSPADM

## 2019-12-26 RX ADMIN — PROPOFOL 50 MG: 10 INJECTION, EMULSION INTRAVENOUS at 14:09

## 2019-12-26 NOTE — ANESTHESIA QCDR
2019 [facility] Clinical Data Registry (for Quality Improvement)     Postoperative nausea/vomiting risk protocol (Adult = 18 yrs and Pediatric 3-17 yrs)- (430 and 463)  General inhalation anesthetic (NOT TIVA) with PONV risk factors: No  Provision of anti-emetic therapy with at least 2 different classes of agents: N/A  Patient DID NOT receive anti-emetic therapy and reason is documented in Medical Record: N/A    Multimodal Pain Management- (AQI59)  Patient undergoing Elective Surgery (i.e. Outpatient, or ASC, or Prescheduled Surgery prior to Hospital Admission): No  Use of Multimodal Pain Management, two or more drugs and/or interventions, NOT including systemic opioids: N/A  Exception: Documented allergy to multiple classes of analgesics: N/A    PACU assessment of acute postoperative pain prior to Anesthesia Care End- Applies to Patients Age = 18- (ABG7)  Initial PACU pain score is which of the following: < 7/10  Patient unable to report pain score: N/A    Post-anesthetic transfer of care checklist/protocol to PACU/ICU- (426 and 427)  Upon conclusion of case, patient transferred to which of the following locations: PACU/Non-ICU  Use of transfer checklist/protocol: Yes  Exclusion: Service Performed in Patient Hospital Room (and thus did not require transfer): N/A    PACU Reintubation- (AQI31)  General anesthesia requiring endotracheal intubation (ETT) along with subsequent extubation in OR or PACU: No  Required reintubation in the PACU: N/A  Extubation was a planned trial documented in the medical record prior to removal of the original airway device: N/A    Unplanned admission to ICU related to anesthesia service up through end of PACU care- (MD51)  Unplanned admission to ICU (not initially anticipated at anesthesia start time): No

## 2019-12-26 NOTE — PROCEDURES
Patient was sedated per Anesthesia. Please see their note for complete details.     SONIA was performed and did not show any LA/STELLA thrombus.   Formal SONIA note to follow.     We then proceeded with the cardioversion.   After adequate sedation, 200J of synchronized cardioversion was attempted, resulting in sinus rhythm.     Continue current medication regimen.     Complications: none     Marisol Andrea MD, [facility]  Cardiologist  [facility]

## 2019-12-26 NOTE — ANESTHESIA PREPROCEDURE EVALUATION
Relevant Problems   CARDIAC   (+) AF (atrial fibrillation) ([facility])   (+) Aneurysm of right ulnar artery ([facility])   (+) Bilateral carotid artery stenosis   (+) Coronary artery disease involving native coronary artery of native heart without angina pectoris         (+) JORGE (acute kidney injury) ([facility])      ENDO   (+) Type 2 diabetes mellitus with hyperglycemia, without long-term current use of insulin ([facility])      Other   (+) Broca's aphasia       Physical Exam    Airway   Mallampati: II  TM distance: >3 FB  Neck ROM: full       Cardiovascular - normal exam  Rhythm: regular  Rate: normal  (-) murmur     Dental - normal exam         Pulmonary - normal exam  Breath sounds clear to auscultation     Abdominal    Neurological - abnormal exam                 Anesthesia Plan    ASA 4   ASA physical status 4 criteria: severe reduction of ejection fractions    Plan - general       Airway plan will be natural airway        Induction: intravenous      Pertinent diagnostic labs and testing reviewed    Informed Consent:    Anesthetic plan and risks discussed with patient.

## 2019-12-26 NOTE — DISCHARGE INSTRUCTIONS
ACTIVITY: Rest and take it easy for the first 24 hours.  A responsible adult is recommended to remain with you during that time.  It is normal to feel sleepy.  We encourage you to not do anything that requires balance, judgment or coordination.    MILD FLU-LIKE SYMPTOMS ARE NORMAL. YOU MAY EXPERIENCE GENERALIZED MUSCLE ACHES, THROAT IRRITATION, HEADACHE AND/OR SOME NAUSEA.    FOR 24 HOURS DO NOT:  Drive, operate machinery or run household appliances.  Drink beer or alcoholic beverages.   Make important decisions or sign legal documents.    SPECIAL INSTRUCTIONS:     Electrical Cardioversion  Electrical cardioversion is the delivery of a jolt of electricity to restore a normal rhythm to the heart. A rhythm that is too fast or is not regular keeps the heart from pumping well. In this procedure, sticky patches or metal paddles are placed on the chest to deliver electricity to the heart from a device.  This procedure may be done in an emergency if:  · There is low or no blood pressure as a result of the heart rhythm.  · Normal rhythm must be restored as fast as possible to protect the brain and heart from further damage.  · It may save a life.  This procedure may also be done for irregular or fast heart rhythms that are not immediately life-threatening.  Tell a health care provider about:  · Any allergies you have.  · All medicines you are taking, including vitamins, herbs, eye drops, creams, and over-the-counter medicines.  · Any problems you or family members have had with anesthetic medicines.  · Any blood disorders you have.  · Any surgeries you have had.  · Any medical conditions you have.  · Whether you are pregnant or may be pregnant.  What are the risks?  Generally, this is a safe procedure. However, problems may occur, including:  · Allergic reactions to medicines.  · A blood clot that breaks free and travels to other parts of your body.  · The possible return of an abnormal heart rhythm within hours or days  after the procedure.  · Your heart stopping (cardiac arrest ). This is rare.  What happens before the procedure?  Medicines  · Your health care provider may have you start taking:  ¨ Blood-thinning medicines (anticoagulants) so your blood does not clot as easily.  ¨ Medicines may be given to help stabilize your heart rate and rhythm.  · Ask your health care provider about changing or stopping your regular medicines. This is especially important if you are taking diabetes medicines or blood thinners.  General instructions  · Plan to have someone take you home from the hospital or clinic.  · If you will be going home right after the procedure, plan to have someone with you for 24 hours.  · Follow instructions from your health care provider about eating or drinking restrictions.  What happens during the procedure?  · To lower your risk of infection:  ¨ Your health care team will wash or sanitize their hands.  ¨ Your skin will be washed with soap.  · An IV tube will be inserted into one of your veins.  · You will be given a medicine to help you relax (sedative).  · Sticky patches (electrodes) or metal paddles may be placed on your chest.  · An electrical shock will be delivered.  The procedure may vary among health care providers and hospitals.  What happens after the procedure?  · Your blood pressure, heart rate, breathing rate, and blood oxygen level will be monitored until the medicines you were given have worn off.  · Do not drive for 24 hours if you were given a sedative.  · Your heart rhythm will be watched to make sure it does not change.  This information is not intended to replace advice given to you by your health care provider. Make sure you discuss any questions you have with your health care provider.  Document Released: 12/08/2003 Document Revised: 08/16/2017 Document Reviewed: 06/23/2017  Elsevier Interactive Patient Education © 2017 [employer]      Transesophageal Echocardiogram  Transesophageal  echocardiography (SONIA) is a special type of test that produces images of the heart by using sound waves (echocardiogram). This type of echocardiography can obtain better images of the heart than standard echocardiography. SONIA is done by passing a flexible tube down the esophagus. The heart is located in front of the esophagus. Because the heart and esophagus are close to one another, your health care provider can take very clear, detailed pictures of the heart via ultrasound waves.  SONIA may be done:  · If your health care provider needs more information based on standard echocardiography findings.  · If you had a stroke. This might have happened because a clot formed in your heart. SONIA can visualize different areas of the heart and check for clots.  · To check valve anatomy and function.  · To check for infection on the inside of your heart (endocarditis).  · To evaluate the dividing wall (septum) of the heart and presence of a hole that did not close after birth (patent foramen ovale or atrial septal defect).  · To help diagnose a tear in the wall of the aorta (aortic dissection).  · During cardiac valve surgery. This allows the surgeon to assess the valve repair before closing the chest.  · During a variety of other cardiac procedures to guide positioning of catheters.  · Sometimes before a cardioversion, which is a shock to convert heart rhythm back to normal.  LET YOUR HEALTH CARE PROVIDER KNOW ABOUT:   · Any allergies you have.  · All medicines you are taking, including vitamins, herbs, eye drops, creams, and over-the-counter medicines.  · Previous problems you or members of your family have had with the use of anesthetics.  · Any blood disorders you have.  · Previous surgeries you have had.  · Medical conditions you have.  · Swallowing difficulties.  · An esophageal obstruction.  RISKS AND COMPLICATIONS   Generally, SONIA is a safe procedure. However, as with any procedure, complications can occur. Possible  complications include an esophageal tear (rupture).  BEFORE THE PROCEDURE   · Do not eat or drink for 6 hours before the procedure or as directed by your health care provider.  · Arrange for someone to drive you home after the procedure. Do not drive yourself home. During the procedure, you will be given medicines that can continue to make you feel drowsy and can impair your reflexes.  · An IV access tube will be started in the arm.  PROCEDURE   · A medicine to help you relax (sedative) will be given through the IV access tube.  · A medicine may be sprayed or gargled to numb the back of the throat.  · Your blood pressure, heart rate, and breathing (vital signs) will be monitored during the procedure.  · The SONIA probe is a long, flexible tube. The tip of the probe is placed into the back of the mouth, and you will be asked to swallow. This helps to pass the tip of the probe into the esophagus. Once the tip of the probe is in the correct area, your health care provider can take pictures of the heart.  · SONIA is usually not a painful procedure. You may feel the probe press against the back of the throat. The probe does not enter the trachea and does not affect your breathing.  AFTER THE PROCEDURE   · You will be in bed, resting, until you have fully returned to consciousness.  · When you first awaken, your throat may feel slightly sore and will probably still feel numb. This will improve slowly over time.  · You will not be allowed to eat or drink until it is clear that the numbness has improved.  · Once you have been able to drink, urinate, and sit on the edge of the bed without feeling sick to your stomach (nausea) or dizzy, you may be cleared to go home.  · You should have a friend or family member with you for the next 24 hours after your procedure.  This information is not intended to replace advice given to you by your health care provider. Make sure you discuss any questions you have with your health care  provider.  Document Released: 03/09/2004 Document Revised: 12/23/2014 Document Reviewed: 06/19/2014  [employer] Interactive Patient Education © 2017 [employer]    DIET: To avoid nausea, slowly advance diet as tolerated, avoiding spicy or greasy foods for the first day.  Add more substantial food to your diet according to your physician's instructions. INCREASE FLUIDS AND FIBER TO AVOID CONSTIPATION.    FOLLOW-UP APPOINTMENT:  A follow-up appointment should be arranged with your doctor; call to schedule.    You should CALL YOUR PHYSICIAN if you develop:  Fever greater than 101 degrees F.  Pain not relieved by medication, or persistent nausea or vomiting.  Excessive bleeding (blood soaking through dressing) or unexpected drainage from the wound.  Extreme redness or swelling around the incision site, drainage of pus or foul smelling drainage.  Inability to urinate or empty your bladder within 8 hours.  Problems with breathing or chest pain.    You should call 911 if you develop problems with breathing or chest pain.  If you are unable to contact your doctor or surgical center, you should go to the nearest emergency room or urgent care center.  Physician's telephone #: [phone]    If any questions arise, call your doctor.  If your doctor is not available, please feel free to call the Surgical Center at [phone].  The Center is open Monday through Friday from 7AM to 7PM.  You can also call the [facility] HOTLINE open 24 hours/day, 7 days/week and speak to a nurse at [phone], or toll free at [phone].    A registered nurse may call you a few days after your surgery to see how you are doing after your procedure.    MEDICATIONS: Resume taking daily medication.  Take prescribed pain medication with food.  If no medication is prescribed, you may take non-aspirin pain medication if needed.  PAIN MEDICATION CAN BE VERY CONSTIPATING.  Take a stool softener or laxative such as senokot, pericolace, or milk of  magnesia if needed.    If your physician has prescribed pain medication that includes Acetaminophen (Tylenol), do not take additional Acetaminophen (Tylenol) while taking the prescribed medication.    Depression / Suicide Risk    As you are discharged from this [facility] facility, it is important to learn how to keep safe from harming yourself.    Recognize the warning signs:  · Abrupt changes in personality, positive or negative- including increase in energy   · Giving away possessions  · Change in eating patterns- significant weight changes-  positive or negative  · Change in sleeping patterns- unable to sleep or sleeping all the time   · Unwillingness or inability to communicate  · Depression  · Unusual sadness, discouragement and loneliness  · Talk of wanting to die  · Neglect of personal appearance   · Rebelliousness- reckless behavior  · Withdrawal from people/activities they love  · Confusion- inability to concentrate     If you or a loved one observes any of these behaviors or has concerns about self-harm, here's what you can do:  · Talk about it- your feelings and reasons for harming yourself  · Remove any means that you might use to hurt yourself (examples: pills, rope, extension cords, firearm)  · Get professional help from the community (Mental Health, Substance Abuse, psychological counseling)  · Do not be alone:Call your Safe Contact- someone whom you trust who will be there for you.  · Call your local CRISIS HOTLINE 076-9418 or 641-989-5840  · Call your local Children's Mobile Crisis Response Team [place] (193) 430-6219 or www.M.dot  · Call the toll free National Suicide Prevention Hotlines   · National Suicide Prevention Lifeline 145-036-CSRC (3518)  · National Hope Line Network 800-SUICIDE (942-5373)

## 2019-12-26 NOTE — ANESTHESIA TIME REPORT
Anesthesia Start and Stop Event Times     Date Time Event    12/26/2019 1336 Ready for Procedure     1400 Anesthesia Start     1436 Anesthesia Stop        Responsible Staff  12/26/19    Name Role Begin End    Tobey Gansert, M.D. Anesth 1400 1436        Preop Diagnosis (Free Text):  Pre-op Diagnosis             Preop Diagnosis (Codes):    Post op Diagnosis  Atrial fibrillation (HCC)      Premium Reason  Non-Premium    Comments:

## 2019-12-26 NOTE — ANESTHESIA POSTPROCEDURE EVALUATION
Patient: Judith Vallecillo    Procedure Summary     Date:  12/26/19 Room / Location:  [facility] - ECHOCARDIOLOGY [facility]    Anesthesia Start:  1400 Anesthesia Stop:  1436    Procedures:       EC-SONIA W/O CONT      CL-CARDIOVERSION Diagnosis:       Cerebral infarction due to occlusion of left middle cerebral artery (HCC)      Type 2 diabetes mellitus with hyperglycemia, without long-term current use of insulin (HCC)      Smoker      Coronary artery disease involving native coronary artery of native heart without angina pectoris      Class 2 obesity due to excess calories without serious comorbidity with body mass index (BMI) of 36.0 to 36.9 in adult      Persistent atrial fibrillation      (See Associated Dx)    Scheduled Providers:  Marisol Andrea M.D.; Tobey Gansert, M.D. Responsible Provider:  Tobey Gansert, M.D.    Anesthesia Type:  general ASA Status:  4          Final Anesthesia Type: general  Last vitals  BP   Blood Pressure: 137/69    Temp   36.7 °C (98.1 °F)    Pulse   Pulse: 93   Resp   18    SpO2   98 %      Anesthesia Post Evaluation    Patient location during evaluation: PACU  Patient participation: complete - patient participated  Level of consciousness: awake and alert    Airway patency: patent  Anesthetic complications: no  Cardiovascular status: hemodynamically stable  Respiratory status: acceptable  Hydration status: euvolemic    PONV: none           Nurse Pain Score: 7 (NPRS)

## 2019-12-26 NOTE — OR NURSING
1438 Pt report received from Cath lab RN, pt brought over with RN and anesthesiologist on [facility]. Pt placed on Tele monitor and EKG called for follow up EKG. No C/O pain at this time, VSS, tele monitor shows SR, family at bedside, pt given water and a snack.     1454 EKG done, pt is SB, no needs at this time.     1554 pt given D/C instructions, pt verbalized understanding, pt was also given [place] information on discharge instructions for cardioversion. Pt taken out in wheelchair, going home with family.

## 2020-01-07 ENCOUNTER — ANTICOAGULATION VISIT (OUTPATIENT)
Dept: VASCULAR LAB | Facility: MEDICAL CENTER | Age: 55
End: 2020-01-07
Attending: INTERNAL MEDICINE
Payer: COMMERCIAL

## 2020-01-07 ENCOUNTER — OFFICE VISIT (OUTPATIENT)
Dept: CARDIOLOGY | Facility: MEDICAL CENTER | Age: 55
End: 2020-01-07
Payer: COMMERCIAL

## 2020-01-07 VITALS
SYSTOLIC BLOOD PRESSURE: 102 MMHG | BODY MASS INDEX: 31.03 KG/M2 | DIASTOLIC BLOOD PRESSURE: 64 MMHG | WEIGHT: 168.6 LBS | HEART RATE: 62 BPM | HEIGHT: 62 IN | OXYGEN SATURATION: 97 %

## 2020-01-07 DIAGNOSIS — I72.1: ICD-10-CM

## 2020-01-07 DIAGNOSIS — I10 HTN (HYPERTENSION), MALIGNANT: ICD-10-CM

## 2020-01-07 DIAGNOSIS — I25.10 CORONARY ARTERY DISEASE INVOLVING NATIVE CORONARY ARTERY OF NATIVE HEART WITHOUT ANGINA PECTORIS: ICD-10-CM

## 2020-01-07 DIAGNOSIS — F17.200 SMOKER: ICD-10-CM

## 2020-01-07 DIAGNOSIS — I50.9 HEART FAILURE, NYHA CLASS 1 (HCC): ICD-10-CM

## 2020-01-07 DIAGNOSIS — I50.20 ACC/AHA STAGE C SYSTOLIC HEART FAILURE (HCC): ICD-10-CM

## 2020-01-07 DIAGNOSIS — Z79.899 HIGH RISK MEDICATION USE: ICD-10-CM

## 2020-01-07 DIAGNOSIS — E66.9 OBESITY (BMI 30-39.9): ICD-10-CM

## 2020-01-07 DIAGNOSIS — I25.5 ISCHEMIC CARDIOMYOPATHY: ICD-10-CM

## 2020-01-07 DIAGNOSIS — I48.19 PERSISTENT ATRIAL FIBRILLATION (HCC): ICD-10-CM

## 2020-01-07 DIAGNOSIS — I65.23 BILATERAL CAROTID ARTERY STENOSIS: ICD-10-CM

## 2020-01-07 DIAGNOSIS — I63.512 CEREBRAL INFARCTION DUE TO OCCLUSION OF LEFT MIDDLE CEREBRAL ARTERY (HCC): ICD-10-CM

## 2020-01-07 LAB — INR PPP: 1.3 (ref 2–3.5)

## 2020-01-07 PROCEDURE — 85610 PROTHROMBIN TIME: CPT

## 2020-01-07 PROCEDURE — 99212 OFFICE O/P EST SF 10 MIN: CPT

## 2020-01-07 PROCEDURE — 93000 ELECTROCARDIOGRAM COMPLETE: CPT | Performed by: INTERNAL MEDICINE

## 2020-01-07 PROCEDURE — 99214 OFFICE O/P EST MOD 30 MIN: CPT | Performed by: NURSE PRACTITIONER

## 2020-01-07 ASSESSMENT — ENCOUNTER SYMPTOMS
PND: 0
COUGH: 0
PALPITATIONS: 0
SHORTNESS OF BREATH: 0
CLAUDICATION: 0
ORTHOPNEA: 0
FEVER: 0
DIZZINESS: 0
ABDOMINAL PAIN: 0
MYALGIAS: 0

## 2020-01-07 NOTE — PROGRESS NOTES
Anticoagulation Summary  As of 2020    INR goal:      TTR:   --   INR used for dosin.30 (2020)   Warfarin maintenance plan:   No maintenance plan   Next INR check:      Target end date:       Indications    AF (atrial fibrillation) (HCC) [I48.91]  Cerebral infarction (HCC) [I63.9]             Anticoagulation Episode Summary     INR check location:       Preferred lab:       Send INR reminders to:       Comments:   Eliquis 5mg BID      Anticoagulation Care Providers     Provider Role Specialty Phone number    [facility] Responsible  [phone]        Anticoagulation Patient Findings     Anticoagulation Patient Findings  Health Status Since Last Assessment  Any new relevant medical problems, ED visits/hospitalizations -no   Any embolic events (stroke / TIA / systemic embolism)-  no     Adherence with DOAC Therapy  1 or more missed doses in an average week-  no  • If yes, number of missed doses n/a  BLEEDING RISK ASSESSMENT NB:     Bleeding Risk Assessment  Severe epistaxis  n Hemoptysis  n  Excessive or unusual bruising / hematomas n  GIB / melena / BRBPR / hematemesis  n  Hematuria? Abnormal vaginal bleeding  n  Concerning daily headache or subdural hematoma symptoms  n  Decreasing hemoglobin or new anemia  n  Latest hemoglobin:  Lab Results   Component Value Date/Time    WBC 8.5 2019 01:45 PM    RBC 4.07 (L) 2019 01:45 PM    HEMOGLOBIN 12.5 2019 01:45 PM    HEMATOCRIT 37.3 2019 01:45 PM    MCV 91.6 2019 01:45 PM    MCH 30.7 2019 01:45 PM    MCHC 33.5 (L) 2019 01:45 PM    MPV 10.6 2019 01:45 PM    NEUTSPOLYS 69.40 2019 03:53 AM    LYMPHOCYTES 17.00 (L) 2019 03:53 AM    MONOCYTES 10.60 2019 03:53 AM    EOSINOPHILS 2.20 2019 03:53 AM    BASOPHILS 0.60 2019 03:53 AM    HYPOCHROMIA 1+ 10/19/2019 05:43 AM    ANISOCYTOSIS 1+ 10/19/2019 05:43 AM      LFTs wnl    EtOH overuse  n  Falls, presyncope, syncope, or  seizures  n  Uncontrolled hypertension n  CREATININE CLEARANCE /     Creatinine Clearance/Renal Function  Latest eGFR / creatinine:     Lab Results   Component Value Date/Time    SODIUM 135 12/26/2019 01:45 PM    POTASSIUM 4.3 12/26/2019 01:45 PM    CHLORIDE 102 12/26/2019 01:45 PM    CO2 23 12/26/2019 01:45 PM    GLUCOSE 169 (H) 12/26/2019 01:45 PM    BUN 24 (H) 12/26/2019 01:45 PM    CREATININE 1.43 (H) 12/26/2019 01:45 PM        • Is eGFR less than 50ml/min  , 52 mils per minute  If YES, calculate CrCl (see back)  Any recent dehydrating illness or medications added/changed? i.e. diuretics     Drug Interactions  ASA / other antiplatelets. none  NSAID none  Other drug interactions  n (Review med list / OTCs;)    Current Outpatient Medications:   •  apixaban (ELIQUIS) 5mg Tab, Take 1 Tab by mouth 2 Times a Day., Disp: 60 Tab, Rfl: 3  •  metoprolol (TOPROL-XL) 200 MG XL tablet, TAKE 1 TABLET BY MOUTH ONCE DAILY, Disp: 90 Tab, Rfl: 3  •  amLODIPine (NORVASC) 10 MG Tab, Take 1 Tab by mouth every day., Disp: 30 Tab, Rfl: 1  •  losartan (COZAAR) 100 MG Tab, Take 1 Tab by mouth every day., Disp: 30 Tab, Rfl: 2  •  hydroCHLOROthiazide (HYDRODIURIL) 12.5 MG tablet, Take 1 Tab by mouth every day., Disp: 30 Tab, Rfl: 1  •  aspirin EC 81 MG EC tablet, Take 1 Tab by mouth every day., Disp: 30 Tab, Rfl: 1  •  digoxin (LANOXIN) 250 MCG Tab, Take 1 Tab by mouth every day at 6 PM., Disp: 30 Tab, Rfl: 11  •  spironolactone (ALDACTONE) 25 MG Tab, Take 1 Tab by mouth every day., Disp: 30 Tab, Rfl: 11  •  clopidogrel (PLAVIX) 75 MG Tab, Take 1 Tab by mouth every day., Disp: 30 Tab, Rfl: 11  •  glipiZIDE (GLUCOTROL) 5 MG Tab, Take 0.5 Tabs by mouth 2 times a day., Disp: 30 Tab, Rfl: 0  •  atorvastatin (LIPITOR) 40 MG Tab, Take 1 Tab by mouth every evening., Disp: 90 Tab, Rfl: 3       Final Assessment and Recommendations:  Patient appears stable from the anticoagulation standpoint  Benefits of continued DOAC therapy outweigh risks for  this patient  Recommend continue current DOAC at same dose of 5 mg twice daily.  She is having some issues with her co-pay as it is pretty expensive for her.  She is not interested in using warfarin at this point because of her schedule.  I gave her a Eliquis coupon card.  She will call them to see if there is any discounts and/or patient assistant programs that she is eligible for based on her income.  She will give us a call and let us know if she needs to switch to warfarin at some point.        Will follow up with patient in  6 months

## 2020-01-07 NOTE — PROGRESS NOTES
Chief Complaint   Patient presents with   • Congestive Heart Failure       Subjective:   Judith Vallecillo is a 54 y.o. female who presents today for follow up on her heart failure, A. fib, CAD.      Patient of the heart failure clinic.  She was last seen in clinic on 12/11/2019.  During that visit, no changes were made to her medical  regimen.  Patient was sent over for SONIA cardioversion for her atrial fibrillation.  She was successfully converted out of A. fib on 12/26/2019.  Also, she was recommended to follow up with vascular surgeon but states that she was unable to contact the office.    Pt does live in an area that does not have good cell reception.    Pt continues to report right arm weakness and weakness.  Otherwise, she denies any chest pain, palpitations, orthopnea, PND, edema, dizziness/lightheadedness or shortness of breath at rest, with ADLs and exertion.    Her weights are remaining stable between 165-160 pounds.    Patient was switched over to Eliquis for her anticoagulation and is hoping she has coverage on this.  She has appointment with vascular medicine today.    Additonally, patient has the following medical problems:    -Hospitalization from 10/18/2019 through 10/20/2019.  Patient was admitted for expressive aphasia from an outlying hospital.  She is found to be in A. fib with RVR, biventricular heart failure.  She was going to have a cardioversion, but her SONIA showed an STELLA thrombus.  Patient also had an angiogram which she received 2 drug-eluting stents to the LAD.  While she was in the hospital, she also had an acute stroke.    -Pseudoaneurysm of her right ulnar artery and had repair on 11/20/2019    -Diabetes: Previously not on treatment, currently taking glipizide    -Hypertension: Taking amlodipine, hydrochlorothiazide, losartan, spironolactone and Toprol-XL    -Hyperlipidemia: Taking atorvastatin    -Smoker, quit during her hospitalization    -History of a stroke    Past Medical  History:   Diagnosis Date   • Atrial fibrillation (HCC)    • CAD (coronary artery disease)    • CHF (congestive heart failure) (HCC)    • Diabetes (HCC)    • Hyperlipidemia    • Hypertension    • Ischemic cardiomyopathy    • Pneumonia 1975    as a child   • Stroke (HCC) 2011    right-sided weakness at that time-resolved     Past Surgical History:   Procedure Laterality Date   • AV FISTULA CREATION Right 2019    Procedure: CREATION, AV FISTULA-ULNAR ARTERY PSEUDOANEURYSM REPAIR;  Surgeon: Ishaan Lechuga M.D.;  Location: [facility];  Service: Vascular   • ZZZ CARDIAC CATH  10/24/2019    KAYCE x2 to mid and distal LAD   • RECOVERY  2011    Performed by SURGERY, RECOVERYONLY at [facility]     Family History   Problem Relation Age of Onset   • Heart Disease Mother         Stents   • Heart Disease Father    • Other Father         Pancreatitis   • Hypertension Sister    • Hypertension Brother    • Hypertension Sister    • GI Disease Brother         Crohns   • Hypertension Brother      Social History     Socioeconomic History   • Marital status: Single     Spouse name: Not on file   • Number of children: Not on file   • Years of education: Not on file   • Highest education level: Not on file   Occupational History   • Not on file   Social Needs   • Financial resource strain: Not on file   • Food insecurity:     Worry: Not on file     Inability: Not on file   • Transportation needs:     Medical: Not on file     Non-medical: Not on file   Tobacco Use   • Smoking status: Former Smoker     Packs/day: 0.50     Years: 20.00     Pack years: 10.00     Types: Cigarettes     Last attempt to quit: 10/18/2019     Years since quittin.2   • Smokeless tobacco: Never Used   • Tobacco comment: Quit smoking when she went into the hospital   Substance and Sexual Activity   • Alcohol use: Not Currently     Frequency: Never     Comment: very rare drink of a beer or wine   • Drug use: No   • Sexual activity:  Not on file   Lifestyle   • Physical activity:     Days per week: Not on file     Minutes per session: Not on file   • Stress: Not on file   Relationships   • Social connections:     Talks on phone: Not on file     Gets together: Not on file     Attends Buddhism service: Not on file     Active member of club or organization: Not on file     Attends meetings of clubs or organizations: Not on file     Relationship status: Not on file   • Intimate partner violence:     Fear of current or ex partner: Not on file     Emotionally abused: Not on file     Physically abused: Not on file     Forced sexual activity: Not on file   Other Topics Concern   • Not on file   Social History Narrative   • Not on file     Allergies   Allergen Reactions   • Bee Anaphylaxis     Swelling and airway closure.   • Latex Itching and Swelling     Swelling, redness, itching.   • Strawberry Itching     itching     Outpatient Encounter Medications as of 1/7/2020   Medication Sig Dispense Refill   • apixaban (ELIQUIS) 5mg Tab Take 1 Tab by mouth 2 Times a Day. 60 Tab 3   • metoprolol (TOPROL-XL) 200 MG XL tablet TAKE 1 TABLET BY MOUTH ONCE DAILY 90 Tab 3   • amLODIPine (NORVASC) 10 MG Tab Take 1 Tab by mouth every day. 30 Tab 1   • losartan (COZAAR) 100 MG Tab Take 1 Tab by mouth every day. 30 Tab 2   • hydroCHLOROthiazide (HYDRODIURIL) 12.5 MG tablet Take 1 Tab by mouth every day. 30 Tab 1   • aspirin EC 81 MG EC tablet Take 1 Tab by mouth every day. 30 Tab 1   • digoxin (LANOXIN) 250 MCG Tab Take 1 Tab by mouth every day at 6 PM. 30 Tab 11   • spironolactone (ALDACTONE) 25 MG Tab Take 1 Tab by mouth every day. 30 Tab 11   • clopidogrel (PLAVIX) 75 MG Tab Take 1 Tab by mouth every day. 30 Tab 11   • glipiZIDE (GLUCOTROL) 5 MG Tab Take 0.5 Tabs by mouth 2 times a day. 30 Tab 0   • atorvastatin (LIPITOR) 40 MG Tab Take 1 Tab by mouth every evening. 90 Tab 3     No facility-administered encounter medications on file as of 1/7/2020.      Review of  "Systems   Constitutional: Negative for fever and malaise/fatigue.   Respiratory: Negative for cough and shortness of breath.    Cardiovascular: Negative for chest pain, palpitations, orthopnea, claudication, leg swelling and PND.   Gastrointestinal: Negative for abdominal pain.   Musculoskeletal: Negative for myalgias.        Right arm swelling and weakness   Neurological: Negative for dizziness.   All other systems reviewed and are negative.       Objective:   /64 (BP Location: Left arm, Patient Position: Sitting, BP Cuff Size: Adult)   Pulse 62   Ht 1.575 m (5' 2\")   Wt 76.5 kg (168 lb 9.6 oz)   SpO2 97%   BMI 30.84 kg/m²     Physical Exam   Constitutional: She is oriented to person, place, and time. She appears well-developed and well-nourished.   HENT:   Head: Normocephalic and atraumatic.   Eyes: Pupils are equal, round, and reactive to light. EOM are normal.   Neck: Normal range of motion. Neck supple. No JVD present.   Cardiovascular: Normal rate, regular rhythm and normal heart sounds.   Pulmonary/Chest: Effort normal and breath sounds normal. No respiratory distress. She has no wheezes. She has no rales.   Abdominal: Soft. Bowel sounds are normal.   Musculoskeletal:         General: No edema.      Comments: Right arm weakness and non-pitting edema, 2 + Radial pulse present, arm is warm and bas good cap refill.    Neurological: She is alert and oriented to person, place, and time.   Skin: Skin is warm and dry.   Psychiatric: She has a normal mood and affect. Her behavior is normal.   Vitals reviewed.    Lab Results   Component Value Date/Time    CHOLSTRLTOT 117 10/20/2019 02:59 AM    LDL 62 10/20/2019 02:59 AM    HDL 23 (A) 10/20/2019 02:59 AM    TRIGLYCERIDE 162 (H) 10/20/2019 02:59 AM       Lab Results   Component Value Date/Time    SODIUM 135 12/26/2019 01:45 PM    POTASSIUM 4.3 12/26/2019 01:45 PM    CHLORIDE 102 12/26/2019 01:45 PM    CO2 23 12/26/2019 01:45 PM    GLUCOSE 169 (H) 12/26/2019 " 01:45 PM    BUN 24 (H) 12/26/2019 01:45 PM    CREATININE 1.43 (H) 12/26/2019 01:45 PM     Lab Results   Component Value Date/Time    ALKPHOSPHAT 108 (H) 11/18/2019 02:07 AM    ASTSGOT 15 11/18/2019 02:07 AM    ALTSGPT 12 11/18/2019 02:07 AM    TBILIRUBIN 0.9 11/18/2019 02:07 AM      Transthoracic Echo Report 10/19/2019  Four chamber dilation. Biventricular failure. LV systolic function is   severely reduced. LVEF 20% with global hypokinesis. No significant   valuvlar disease visualized on this study. RVSP estimated at 40 mmHg.   IVC is dilated and fails to collapse with inspiration suggesting   elevated central venous pressures. Compared to the images of the study done on 06/01/2011 there has been a severe decline in biventricular function.    Carotid Duplex Report 10/19/2019   Mild bilateral internal carotid artery stenosis (<50%).    Thyroid nodules measuring less than 1 cm.    CT calcium score 10/23/2019  FINDINGS:    Coronary calcification:  LMA - 0.0  LCX - 117  LAD - 246  RCA - 51    Total Calcium Score: 414    Percentile: Calcium score is worse than the 90th percentile for the patient's age and sex.    Other findings:  Heart: Moderate enlargement especially of the left ventricle.  Lungs: Incompletely visualized right pleural effusion and basilar consolidation.  Mediastinum: Normal.  Central pulmonary arteries are enlarged with rapid peripheral tapering  Small pericardial effusion  Upper abdomen: Normal.    Breathing motion artifact        Heart Cath 10/24/2019  Post-operative Diagnosis:   1.  Single-vessel coronary artery disease with 70% stenosis in mid left anterior descending artery (IFR 0.88) and 90% distal left anterior descending artery stenosis  2.  Status post stenting of the mid left anterior descending artery with 3 x 15 mm Xience Deepika drug eluting stent and 2.5 x 15 mm Xience Deepika drug eluting stent to the distal LAD  3.  Cardiomyopathy likely predominantly from nonischemic etiology, probably  tachycardia induced  4.  Atrial fibrillation  Assessment:     1. ACC/AHA stage C systolic heart failure (HCC)  EC-ECHOCARDIOGRAM COMPLETE W/O CONT    EKG    EKG    Basic Metabolic Panel   2. Heart failure, NYHA class 1 (HCC)     3. Persistent atrial fibrillation  EC-ECHOCARDIOGRAM COMPLETE W/O CONT    EKG    EKG    Basic Metabolic Panel   4. Coronary artery disease involving native coronary artery of native heart without angina pectoris     5. Ischemic cardiomyopathy     6. HTN (hypertension), malignant     7. Obesity (BMI 30-39.9)     8. Aneurysm of right ulnar artery (HCC)     9. Cerebral infarction due to occlusion of left middle cerebral artery (HCC)     10. Bilateral carotid artery stenosis     11. High risk medication use     12. Smoker         Medical Decision Making:  Today's Assessment / Status / Plan:   1. HFrEF, Stage C, Class 1, LVEF 20%: Based on physical examination findings, patient is euvolemic. No JVD, lungs are clear to auscultation, no pitting edema in bilateral lower extremities, no ascites.  -Obtain a repeat echocardiogram, Discussed with patient if EF remains less than 35%, we will consider ICD for primary prevention, to optimize his heart failure regimen  -Continue losartan 100 mg daily  -Continue spironolactone 25 mg daily  -Continue digoxin to 250 mcg daily  -Continue Toprol- mg daily  -Reinforced s/sx of worsening heart failure with patient and weight monitoring. Pt verbalizes understanding. Pt to call office or RTC if present.   -Reinforced education to Maintain adequate hydration and 2 gram sodium diet.    2.  CAD, a/p KAYCE x2 to the LAD: Last LDL 62 on 10/20/2019  -Continue aspirin 81 mg daily (resumed after Pseudoaneurysm)  -Continue clopidogrel 75 mg daily  -Continue atorvastatin 40 mg daily  -Encouraged continued smoking cessation    3.  Atrial fibrillation: Had cardioversion on 12/26/2019, EKG today shows sinus bradycardia  -Continue digoxin to 250 mcg daily  -Continue Eliquis 5  mg twice a day, patient has appointment with anticoagulation clinic today, will discuss if she can continue to afford medication  -Discussed bleeding precautions    4.  Hypertension: Stable  -Continue recommendations per above and amlodipine 10 mg daily, hydrochlorothiazide 12.5 mg daily    5.  Stroke:  -Continue statin, aspirin and Plavix per above  -Patient has mild bilateral carotid stenosis  -FU with PCP    6.  Diabetes:  -Encourage patient to follow-up with PCP  -Continue glipizide 2.5 mg twice a day    7.  Obesity: BMI 30.4  -Encouraged continued weight loss    8.  Pseudoaneurysm, s/p Repair:  -Vascular office contacted, they stated they have been trying to get a hold of patient and have left messages.  Patient in town today and the office will attempt to call the patient again today  -Patient to follow-up as soon as possible, and to discuss need for aspirin or Plavix.  Patient currently on triple therapy    FU in clinic in 4-6 weeks with Dr. Westfall and echo. Sooner if needed.    Patient verbalizes understanding and agrees with the plan of care.     Collaborating MD: Cesario Westfall MD

## 2020-01-08 LAB — INR BLD: 1.3 (ref 0.9–1.2)

## 2020-01-09 LAB — EKG IMPRESSION: NORMAL

## 2020-02-06 DIAGNOSIS — I50.20 ACC/AHA STAGE C SYSTOLIC HEART FAILURE (HCC): ICD-10-CM

## 2020-02-06 DIAGNOSIS — I48.19 PERSISTENT ATRIAL FIBRILLATION (HCC): ICD-10-CM

## 2020-02-11 ENCOUNTER — OFFICE VISIT (OUTPATIENT)
Dept: CARDIOLOGY | Facility: MEDICAL CENTER | Age: 55
End: 2020-02-11
Payer: COMMERCIAL

## 2020-02-11 VITALS
OXYGEN SATURATION: 98 % | WEIGHT: 170 LBS | BODY MASS INDEX: 31.28 KG/M2 | SYSTOLIC BLOOD PRESSURE: 164 MMHG | DIASTOLIC BLOOD PRESSURE: 74 MMHG | RESPIRATION RATE: 16 BRPM | HEART RATE: 70 BPM | HEIGHT: 62 IN

## 2020-02-11 DIAGNOSIS — I65.23 BILATERAL CAROTID ARTERY STENOSIS: ICD-10-CM

## 2020-02-11 DIAGNOSIS — I25.10 CORONARY ARTERY DISEASE INVOLVING NATIVE CORONARY ARTERY OF NATIVE HEART WITHOUT ANGINA PECTORIS: ICD-10-CM

## 2020-02-11 DIAGNOSIS — F17.200 SMOKER: ICD-10-CM

## 2020-02-11 DIAGNOSIS — I50.20 ACC/AHA STAGE C SYSTOLIC CONGESTIVE HEART FAILURE (HCC): ICD-10-CM

## 2020-02-11 DIAGNOSIS — E11.65 TYPE 2 DIABETES MELLITUS WITH HYPERGLYCEMIA, WITHOUT LONG-TERM CURRENT USE OF INSULIN (HCC): ICD-10-CM

## 2020-02-11 DIAGNOSIS — Z79.899 HIGH RISK MEDICATION USE: ICD-10-CM

## 2020-02-11 DIAGNOSIS — E66.09 CLASS 2 OBESITY DUE TO EXCESS CALORIES WITHOUT SERIOUS COMORBIDITY WITH BODY MASS INDEX (BMI) OF 36.0 TO 36.9 IN ADULT: ICD-10-CM

## 2020-02-11 DIAGNOSIS — I72.1: ICD-10-CM

## 2020-02-11 DIAGNOSIS — N17.9 AKI (ACUTE KIDNEY INJURY) (HCC): ICD-10-CM

## 2020-02-11 PROCEDURE — 99215 OFFICE O/P EST HI 40 MIN: CPT | Mod: 25 | Performed by: INTERNAL MEDICINE

## 2020-02-11 PROCEDURE — 99406 BEHAV CHNG SMOKING 3-10 MIN: CPT | Performed by: INTERNAL MEDICINE

## 2020-02-11 RX ORDER — GLIPIZIDE 5 MG/1
2.5 TABLET ORAL 2 TIMES DAILY
Qty: 30 TAB | Refills: 11 | Status: SHIPPED | OUTPATIENT
Start: 2020-02-11

## 2020-02-11 ASSESSMENT — MINNESOTA LIVING WITH HEART FAILURE QUESTIONNAIRE (MLHF)
TOTAL_SCORE: 29
DIFFICULTY WITH RECREATIONAL PASTIMES, SPORTS, HOBBIES: 2
GIVING YOU SIDE EFFECTS FROM TREATMENTS: 1
MAKING YOU SHORT OF BREATH: 1
TIRED, FATIGUED OR LOW ON ENERGY: 1
DIFFICULTY SLEEPING WELL AT NIGHT: 0
COSTING YOU MONEY FOR MEDICAL CARE: 4
LOSS OF SELF CONTROL IN YOUR LIFE: 2
FEELING LIKE A BURDEN TO FAMILY AND FRIENDS: 3
MAKING YOU FEEL DEPRESSED: 1
DIFFICULTY TO CONCENTRATE OR REMEMBERING THINGS: 0
WORKING AROUND THE HOUSE OR YARD DIFFICULT: 2
DIFFICULTY WITH SEXUAL ACTIVITIES: 0
MAKING YOU STAY IN A HOSPITAL: 0
DIFFICULTY GOING AWAY FROM HOME: 0
MAKING YOU WORRY: 2
HAVING TO SIT OR LIE DOWN DURING THE DAY: 1
DIFFICULTY WORKING TO EARN A LIVING: 2
WALKING ABOUT OR CLIMBING STAIRS DIFFICULT: 3
DIFFICULTY SOCIALIZING WITH FAMILY OR FRIENDS: 0
SWELLING IN ANKLES OR LEGS: 0
EATING LESS FOODS YOU LIKE: 4

## 2020-02-11 ASSESSMENT — 6 MINUTE WALK TEST (6MWT): TOTAL DISTANCE WALKED (METERS): 256

## 2020-02-11 ASSESSMENT — ENCOUNTER SYMPTOMS
CARDIOVASCULAR NEGATIVE: 1
WHEEZING: 0
CLAUDICATION: 0
DIZZINESS: 0
RESPIRATORY NEGATIVE: 1
MUSCULOSKELETAL NEGATIVE: 1
LOSS OF CONSCIOUSNESS: 0
CHILLS: 0
BRUISES/BLEEDS EASILY: 0
SORE THROAT: 0
CONSTITUTIONAL NEGATIVE: 1
NEUROLOGICAL NEGATIVE: 1
WEAKNESS: 0
EYES NEGATIVE: 1
PALPITATIONS: 0
SHORTNESS OF BREATH: 0
SPUTUM PRODUCTION: 0
FEVER: 0
STRIDOR: 0
HEMOPTYSIS: 0
ORTHOPNEA: 0
PND: 0
GASTROINTESTINAL NEGATIVE: 1
COUGH: 0

## 2020-02-11 NOTE — PROGRESS NOTES
Chief Complaint   Patient presents with   • CHF (Systolic)     F/V: 1 Mo       Subjective:   Judith Vallecillo is a 54 y.o. female who presents today as a follow-up for her rate related cardiomyopathy.  Since she was last seen she is back to work.  She quit smoking.  Her blood pressure is controlled at home.  She checks it often is between 110 and 120 systolic.  She has no functional imitations.  She is compliant with her medications.  She is having trouble affording her Eliquis.  She is in sinus rhythm today.  She is not been able to get a repeat echocardiogram due to problems with distance to travel.    Past Medical History:   Diagnosis Date   • Atrial fibrillation ([facility])    • CAD (coronary artery disease)    • CHF (congestive heart failure) ([facility])    • Diabetes ([facility])    • Hyperlipidemia    • Hypertension    • Ischemic cardiomyopathy    • Pneumonia 1975    as a child   • Stroke ([facility]) 2011    right-sided weakness at that time-resolved     Past Surgical History:   Procedure Laterality Date   • AV FISTULA CREATION Right 11/20/2019    Procedure: CREATION, AV FISTULA-ULNAR ARTERY PSEUDOANEURYSM REPAIR;  Surgeon: Ishaan Lechuga M.D.;  Location: [facility];  Service: Vascular   • ZZZ CARDIAC CATH  10/24/2019    KAYCE x2 to mid and distal LAD   • RECOVERY  6/2/2011    Performed by SURGERYSANDRA at [facility]     Family History   Problem Relation Age of Onset   • Heart Disease Mother         Stents   • Heart Disease Father    • Other Father         Pancreatitis   • Hypertension Sister    • Hypertension Brother    • Hypertension Sister    • GI Disease Brother         Crohns   • Hypertension Brother      Social History     Socioeconomic History   • Marital status: Single     Spouse name: Not on file   • Number of children: Not on file   • Years of education: Not on file   • Highest education level: Not on file   Occupational History   • Not on file   Social Needs   • Financial resource  strain: Not on file   • Food insecurity:     Worry: Not on file     Inability: Not on file   • Transportation needs:     Medical: Not on file     Non-medical: Not on file   Tobacco Use   • Smoking status: Former Smoker     Packs/day: 0.50     Years: 20.00     Pack years: 10.00     Types: Cigarettes     Last attempt to quit: 10/18/2019     Years since quittin.3   • Smokeless tobacco: Never Used   • Tobacco comment: Quit smoking when she went into the hospital   Substance and Sexual Activity   • Alcohol use: Not Currently     Frequency: Never     Comment: very rare drink of a beer or wine   • Drug use: No   • Sexual activity: Not on file   Lifestyle   • Physical activity:     Days per week: Not on file     Minutes per session: Not on file   • Stress: Not on file   Relationships   • Social connections:     Talks on phone: Not on file     Gets together: Not on file     Attends Jehovah's witness service: Not on file     Active member of club or organization: Not on file     Attends meetings of clubs or organizations: Not on file     Relationship status: Not on file   • Intimate partner violence:     Fear of current or ex partner: Not on file     Emotionally abused: Not on file     Physically abused: Not on file     Forced sexual activity: Not on file   Other Topics Concern   • Not on file   Social History Narrative   • Not on file     Allergies   Allergen Reactions   • Bee Anaphylaxis     Swelling and airway closure.   • Latex Itching and Swelling     Swelling, redness, itching.   • Strawberry Itching     itching     Outpatient Encounter Medications as of 2020   Medication Sig Dispense Refill   • glipiZIDE (GLUCOTROL) 5 MG Tab Take 0.5 Tabs by mouth 2 times a day. 30 Tab 11   • apixaban (ELIQUIS) 5mg Tab Take 1 Tab by mouth 2 Times a Day. 60 Tab 3   • metoprolol (TOPROL-XL) 200 MG XL tablet TAKE 1 TABLET BY MOUTH ONCE DAILY 90 Tab 3   • amLODIPine (NORVASC) 10 MG Tab Take 1 Tab by mouth every day. 30 Tab 1   • losartan  "(COZAAR) 100 MG Tab Take 1 Tab by mouth every day. 30 Tab 2   • hydroCHLOROthiazide (HYDRODIURIL) 12.5 MG tablet Take 1 Tab by mouth every day. 30 Tab 1   • aspirin EC 81 MG EC tablet Take 1 Tab by mouth every day. 30 Tab 1   • digoxin (LANOXIN) 250 MCG Tab Take 1 Tab by mouth every day at 6 PM. 30 Tab 11   • spironolactone (ALDACTONE) 25 MG Tab Take 1 Tab by mouth every day. 30 Tab 11   • clopidogrel (PLAVIX) 75 MG Tab Take 1 Tab by mouth every day. 30 Tab 11   • atorvastatin (LIPITOR) 40 MG Tab Take 1 Tab by mouth every evening. 90 Tab 3   • [DISCONTINUED] glipiZIDE (GLUCOTROL) 5 MG Tab Take 0.5 Tabs by mouth 2 times a day. 30 Tab 0     No facility-administered encounter medications on file as of 2/11/2020.      Review of Systems   Constitutional: Negative.  Negative for chills, fever and malaise/fatigue.   HENT: Negative.  Negative for sore throat.    Eyes: Negative.    Respiratory: Negative.  Negative for cough, hemoptysis, sputum production, shortness of breath, wheezing and stridor.    Cardiovascular: Negative.  Negative for chest pain, palpitations, orthopnea, claudication, leg swelling and PND.   Gastrointestinal: Negative.    Genitourinary: Negative.    Musculoskeletal: Negative.    Skin: Negative.    Neurological: Negative.  Negative for dizziness, loss of consciousness and weakness.   Endo/Heme/Allergies: Negative.  Does not bruise/bleed easily.   All other systems reviewed and are negative.       Objective:   BP (!) 164/74 (BP Location: Left arm, Patient Position: Sitting, BP Cuff Size: Adult)   Pulse 70   Resp 16   Ht 1.575 m (5' 2\")   Wt 77.1 kg (170 lb)   SpO2 98%   BMI 31.09 kg/m²     Physical Exam   Constitutional: She appears well-developed and well-nourished. No distress.   HENT:   Head: Normocephalic and atraumatic.   Right Ear: External ear normal.   Left Ear: External ear normal.   Nose: Nose normal.   Mouth/Throat: No oropharyngeal exudate.   Eyes: Pupils are equal, round, and reactive " to light. Conjunctivae and EOM are normal. Right eye exhibits no discharge. Left eye exhibits no discharge. No scleral icterus.   Neck: Neck supple. No JVD present.   Cardiovascular: Normal rate, regular rhythm and intact distal pulses. Exam reveals no gallop and no friction rub.   No murmur heard.  Pulmonary/Chest: Effort normal. No stridor. No respiratory distress. She has no wheezes. She has no rales. She exhibits no tenderness.   Abdominal: Soft. She exhibits no distension. There is no guarding.   Musculoskeletal: Normal range of motion.         General: No tenderness, deformity or edema.   Neurological: She is alert. She has normal reflexes. She displays normal reflexes. No cranial nerve deficit. She exhibits normal muscle tone. Coordination normal.   Skin: Skin is warm and dry. No rash noted. She is not diaphoretic. No erythema. No pallor.   Psychiatric: She has a normal mood and affect. Her behavior is normal. Judgment and thought content normal.   Nursing note and vitals reviewed.      Assessment:     1. Bilateral carotid artery stenosis     2. Aneurysm of right ulnar artery (Former[facility])     3. JORGE (acute kidney injury) (Formerly [facility])     4. Class 2 obesity due to excess calories without serious comorbidity with body mass index (BMI) of 36.0 to 36.9 in adult     5. Coronary artery disease involving native coronary artery of native heart without angina pectoris  EC-ECHOCARDIOGRAM COMPLETE W/O CONT   6. High risk medication use     7. Smoker     8. Type 2 diabetes mellitus with hyperglycemia, without long-term current use of insulin ([facility])  EC-ECHOCARDIOGRAM COMPLETE W/O CONT    glipiZIDE (GLUCOTROL) 5 MG Tab   9. ACC/AHA stage C systolic congestive heart failure (HCC)         Medical Decision Making:  Today's Assessment / Status / Plan:     54-year-old female with heart failure with reduced ejection fraction from a combination of ischemic cardiomyopathy as well as atrial fibrillation with rapid ventricular response.  At this  point I will keep her on aspirin and Plavix but stop the Eliquis.  I discussed with her the risk benefits of this and she agrees.  I have refilled her glipizide for her diabetes.  We will recheck an echocardiogram.  We will see her back in 6 months.  We will stop her Plavix in October at the one-year anniversary of her stent.  We did talk for 5 minutes regarding smoking cessation and the fact that she is quit and strategies to stay away from smoking.

## 2020-07-14 ENCOUNTER — APPOINTMENT (OUTPATIENT)
Dept: VASCULAR LAB | Facility: MEDICAL CENTER | Age: 55
End: 2020-07-14
Payer: COMMERCIAL

## 2020-10-16 DIAGNOSIS — Z79.01 CHRONIC ANTICOAGULATION: ICD-10-CM

## 2022-05-27 NOTE — PROGRESS NOTES
Notified by [facility] CCT monitor pt had 2.3 sp pt asymptomatic chart reviewed hx up to 3.2 arrest will continue to monitor.    breast lump

## (undated) DEVICE — CANISTER SUCTION 3000ML MECHANICAL FILTER AUTO SHUTOFF MEDI-VAC NONSTERILE LF DISP  (40EA/CA)

## (undated) DEVICE — PAD PREP 24 X 48 CUFFED - (100/CA)

## (undated) DEVICE — SUTURE 4-0 MONOCRYL PLUS PS-2 - 27 INCH (36/BX)

## (undated) DEVICE — SODIUM CHL. INJ. 0.9% 500ML (24EA/CA 50CA/PF)

## (undated) DEVICE — DRESSING TRANSPARENT FILM TEGADERM 2.375 X 2.75"  (100EA/BX)"

## (undated) DEVICE — HEAD HOLDER JUNIOR/ADULT

## (undated) DEVICE — SPONGE GAUZESTER 4 X 4 4PLY - (128PK/CA)

## (undated) DEVICE — SET LEADWIRE 5 LEAD BEDSIDE DISPOSABLE ECG (1SET OF 5/EA)

## (undated) DEVICE — SUTURE 3-0 SILK 12 X 18 IN - (36/BX)

## (undated) DEVICE — SYRINGE SAFETY TB 1 ML 27 GA X 1/2 IN (100/BX 4BX/CA)

## (undated) DEVICE — PROTECTOR ULNA NERVE - (36PR/CA)

## (undated) DEVICE — BANDAGE ELASTIC 4 HONEYCOMB - 4"X5YD LF (20/CA)"

## (undated) DEVICE — SUTURE 6-0 PROLENE BV-1 D/A 30 (36PK/BX)"

## (undated) DEVICE — SENSOR SPO2 NEO LNCS ADHESIVE (20/BX) SEE USER NOTES

## (undated) DEVICE — CHLORAPREP 26 ML APPLICATOR - ORANGE TINT(25/CA)

## (undated) DEVICE — SUTURE 7-0 PROLENE BV-1 D/A 30 (36PK/BX)"

## (undated) DEVICE — PEN SKIN MARKER W/RULER - (50EA/BX)

## (undated) DEVICE — CLIP SM INTNL HRZN TI ESCP LGT - (24EA/PK 25PK/BX)

## (undated) DEVICE — ELECTRODE 850 FOAM ADHESIVE - HYDROGEL RADIOTRNSPRNT (50/PK)

## (undated) DEVICE — Device

## (undated) DEVICE — SYRINGE SAFETY 10 ML 18 GA X 1 1/2 BLUNT LL (100/BX 4BX/CA)

## (undated) DEVICE — BLOCK

## (undated) DEVICE — SET EXTENSION WITH 2 PORTS (48EA/CA) ***PART #2C8610 IS A SUBSTITUTE*****

## (undated) DEVICE — SUTURE 4-0 SILK 12 X 18 INCH - (36/BX)

## (undated) DEVICE — DRAPE LARGE 3 QUARTER - (20/CA)

## (undated) DEVICE — SUTURE GENERAL

## (undated) DEVICE — NEPTUNE 4 PORT MANIFOLD - (20/PK)

## (undated) DEVICE — GOWN SURGEONS X-LARGE - DISP. (30/CA)

## (undated) DEVICE — SYRINGE 30 ML LL (56/BX)

## (undated) DEVICE — MASK ANESTHESIA ADULT  - (100/CA)

## (undated) DEVICE — BLADE SURGICAL #11 - (50/BX)

## (undated) DEVICE — SODIUM CHL IRRIGATION 0.9% 1000ML (12EA/CA)

## (undated) DEVICE — GLOVE BIOGEL PI INDICATOR SZ 6.5 SURGICAL PF LF - (50/BX 4BX/CA)

## (undated) DEVICE — GELAQUASONIC 100 ULTRASOUND - 48/BX 20GM STERILE FOIL POUCH

## (undated) DEVICE — SUTURE 4-0 VICRYL PLUS RB-1 - 27 INCH (36/BX)

## (undated) DEVICE — DECANTER FLD BLS - (50/CA)

## (undated) DEVICE — SUTURE CV

## (undated) DEVICE — SUCTION INSTRUMENT YANKAUER BULBOUS TIP W/O VENT (50EA/CA)

## (undated) DEVICE — CLIP MED INTNL HRZN TI ESCP - (25/BX)

## (undated) DEVICE — ELECTRODE DUAL RETURN W/ CORD - (50/PK)

## (undated) DEVICE — BANDAGE STERILE 3 IN X 75 IN (12EA/BX 8BX/CA)

## (undated) DEVICE — GLOVE BIOGEL PI ORTHO SZ 7.5 PF LF (40PR/BX)

## (undated) DEVICE — TOURNIQUET, STERILE 18 (RED)

## (undated) DEVICE — SUTURE 3-0 VICRYL PLUS SH - 8X 18 INCH (12/BX)

## (undated) DEVICE — VESSELOOP MINI BLUE STERILE - SURG-I-LOOP (10EA/BX)

## (undated) DEVICE — KIT ROOM DECONTAMINATION

## (undated) DEVICE — DRESSING TRANSPARENT FILM TEGADERM 4 X 4.75" (50EA/BX)"